# Patient Record
Sex: MALE | Race: WHITE | NOT HISPANIC OR LATINO | Employment: FULL TIME | URBAN - METROPOLITAN AREA
[De-identification: names, ages, dates, MRNs, and addresses within clinical notes are randomized per-mention and may not be internally consistent; named-entity substitution may affect disease eponyms.]

---

## 2022-04-14 ENCOUNTER — HOSPITAL ENCOUNTER (INPATIENT)
Facility: MEDICAL CENTER | Age: 47
LOS: 7 days | DRG: 871 | End: 2022-04-21
Attending: EMERGENCY MEDICINE | Admitting: STUDENT IN AN ORGANIZED HEALTH CARE EDUCATION/TRAINING PROGRAM
Payer: OTHER MISCELLANEOUS

## 2022-04-14 DIAGNOSIS — K68.12 PSOAS ABSCESS (HCC): ICD-10-CM

## 2022-04-14 DIAGNOSIS — M46.58 SEPTIC ARTHRITIS OF SACROILIAC JOINT (HCC): ICD-10-CM

## 2022-04-14 DIAGNOSIS — B95.61 MSSA BACTEREMIA: ICD-10-CM

## 2022-04-14 DIAGNOSIS — R16.1 SPLENOMEGALY: ICD-10-CM

## 2022-04-14 DIAGNOSIS — R78.81 MSSA BACTEREMIA: ICD-10-CM

## 2022-04-14 DIAGNOSIS — D64.9 NORMOCYTIC ANEMIA: ICD-10-CM

## 2022-04-14 LAB
ALBUMIN SERPL BCP-MCNC: 3.9 G/DL (ref 3.2–4.9)
ALBUMIN/GLOB SERPL: 0.9 G/DL
ALP SERPL-CCNC: 89 U/L (ref 30–99)
ALT SERPL-CCNC: 45 U/L (ref 2–50)
ANION GAP SERPL CALC-SCNC: 14 MMOL/L (ref 7–16)
APPEARANCE UR: CLEAR
AST SERPL-CCNC: 27 U/L (ref 12–45)
BACTERIA #/AREA URNS HPF: NEGATIVE /HPF
BASOPHILS # BLD AUTO: 0.3 % (ref 0–1.8)
BASOPHILS # BLD: 0.05 K/UL (ref 0–0.12)
BILIRUB SERPL-MCNC: 0.3 MG/DL (ref 0.1–1.5)
BILIRUB UR QL STRIP.AUTO: NEGATIVE
BUN SERPL-MCNC: 16 MG/DL (ref 8–22)
CALCIUM SERPL-MCNC: 9.4 MG/DL (ref 8.5–10.5)
CHLORIDE SERPL-SCNC: 100 MMOL/L (ref 96–112)
CO2 SERPL-SCNC: 23 MMOL/L (ref 20–33)
COLOR UR: YELLOW
CORTIS SERPL-MCNC: 8.8 UG/DL (ref 0–23)
CREAT SERPL-MCNC: 0.95 MG/DL (ref 0.5–1.4)
CRP SERPL HS-MCNC: 14.12 MG/DL (ref 0–0.75)
EOSINOPHIL # BLD AUTO: 0.29 K/UL (ref 0–0.51)
EOSINOPHIL NFR BLD: 1.7 % (ref 0–6.9)
EPI CELLS #/AREA URNS HPF: ABNORMAL /HPF
ERYTHROCYTE [DISTWIDTH] IN BLOOD BY AUTOMATED COUNT: 43.4 FL (ref 35.9–50)
ERYTHROCYTE [SEDIMENTATION RATE] IN BLOOD BY WESTERGREN METHOD: 123 MM/HOUR (ref 0–20)
GFR SERPLBLD CREATININE-BSD FMLA CKD-EPI: 98 ML/MIN/1.73 M 2
GLOBULIN SER CALC-MCNC: 4.3 G/DL (ref 1.9–3.5)
GLUCOSE SERPL-MCNC: 117 MG/DL (ref 65–99)
GLUCOSE UR STRIP.AUTO-MCNC: NEGATIVE MG/DL
HCT VFR BLD AUTO: 36.7 % (ref 42–52)
HGB BLD-MCNC: 12.1 G/DL (ref 14–18)
HYALINE CASTS #/AREA URNS LPF: ABNORMAL /LPF
IMM GRANULOCYTES # BLD AUTO: 0.13 K/UL (ref 0–0.11)
IMM GRANULOCYTES NFR BLD AUTO: 0.8 % (ref 0–0.9)
INR PPP: 1.25 (ref 0.87–1.13)
KETONES UR STRIP.AUTO-MCNC: NEGATIVE MG/DL
LACTATE BLD-SCNC: 0.7 MMOL/L (ref 0.5–2)
LACTATE BLD-SCNC: 1.5 MMOL/L (ref 0.5–2)
LACTATE BLD-SCNC: 2.1 MMOL/L (ref 0.5–2)
LEUKOCYTE ESTERASE UR QL STRIP.AUTO: ABNORMAL
LYMPHOCYTES # BLD AUTO: 2.08 K/UL (ref 1–4.8)
LYMPHOCYTES NFR BLD: 12.3 % (ref 22–41)
MCH RBC QN AUTO: 30.5 PG (ref 27–33)
MCHC RBC AUTO-ENTMCNC: 33 G/DL (ref 33.7–35.3)
MCV RBC AUTO: 92.4 FL (ref 81.4–97.8)
MICRO URNS: ABNORMAL
MONOCYTES # BLD AUTO: 0.47 K/UL (ref 0–0.85)
MONOCYTES NFR BLD AUTO: 2.8 % (ref 0–13.4)
NEUTROPHILS # BLD AUTO: 13.87 K/UL (ref 1.82–7.42)
NEUTROPHILS NFR BLD: 82.1 % (ref 44–72)
NITRITE UR QL STRIP.AUTO: NEGATIVE
NRBC # BLD AUTO: 0 K/UL
NRBC BLD-RTO: 0 /100 WBC
PH UR STRIP.AUTO: 6 [PH] (ref 5–8)
PLATELET # BLD AUTO: 452 K/UL (ref 164–446)
PMV BLD AUTO: 8.3 FL (ref 9–12.9)
POTASSIUM SERPL-SCNC: 4.1 MMOL/L (ref 3.6–5.5)
PROCALCITONIN SERPL-MCNC: 0.11 NG/ML
PROT SERPL-MCNC: 8.2 G/DL (ref 6–8.2)
PROT UR QL STRIP: NEGATIVE MG/DL
PROTHROMBIN TIME: 15.3 SEC (ref 12–14.6)
RBC # BLD AUTO: 3.97 M/UL (ref 4.7–6.1)
RBC # URNS HPF: ABNORMAL /HPF
RBC UR QL AUTO: ABNORMAL
SODIUM SERPL-SCNC: 137 MMOL/L (ref 135–145)
SP GR UR STRIP.AUTO: 1.02
UROBILINOGEN UR STRIP.AUTO-MCNC: 0.2 MG/DL
WBC # BLD AUTO: 16.9 K/UL (ref 4.8–10.8)
WBC #/AREA URNS HPF: ABNORMAL /HPF

## 2022-04-14 PROCEDURE — 87077 CULTURE AEROBIC IDENTIFY: CPT

## 2022-04-14 PROCEDURE — 700111 HCHG RX REV CODE 636 W/ 250 OVERRIDE (IP): Performed by: STUDENT IN AN ORGANIZED HEALTH CARE EDUCATION/TRAINING PROGRAM

## 2022-04-14 PROCEDURE — 82533 TOTAL CORTISOL: CPT

## 2022-04-14 PROCEDURE — 85652 RBC SED RATE AUTOMATED: CPT

## 2022-04-14 PROCEDURE — 87086 URINE CULTURE/COLONY COUNT: CPT

## 2022-04-14 PROCEDURE — 85025 COMPLETE CBC W/AUTO DIFF WBC: CPT

## 2022-04-14 PROCEDURE — 84145 PROCALCITONIN (PCT): CPT

## 2022-04-14 PROCEDURE — 36415 COLL VENOUS BLD VENIPUNCTURE: CPT

## 2022-04-14 PROCEDURE — 86140 C-REACTIVE PROTEIN: CPT

## 2022-04-14 PROCEDURE — 81001 URINALYSIS AUTO W/SCOPE: CPT

## 2022-04-14 PROCEDURE — 80053 COMPREHEN METABOLIC PANEL: CPT

## 2022-04-14 PROCEDURE — 770001 HCHG ROOM/CARE - MED/SURG/GYN PRIV*

## 2022-04-14 PROCEDURE — 700102 HCHG RX REV CODE 250 W/ 637 OVERRIDE(OP): Performed by: STUDENT IN AN ORGANIZED HEALTH CARE EDUCATION/TRAINING PROGRAM

## 2022-04-14 PROCEDURE — 99223 1ST HOSP IP/OBS HIGH 75: CPT | Mod: AI | Performed by: STUDENT IN AN ORGANIZED HEALTH CARE EDUCATION/TRAINING PROGRAM

## 2022-04-14 PROCEDURE — 85610 PROTHROMBIN TIME: CPT

## 2022-04-14 PROCEDURE — 87040 BLOOD CULTURE FOR BACTERIA: CPT

## 2022-04-14 PROCEDURE — 700105 HCHG RX REV CODE 258: Performed by: STUDENT IN AN ORGANIZED HEALTH CARE EDUCATION/TRAINING PROGRAM

## 2022-04-14 PROCEDURE — A9270 NON-COVERED ITEM OR SERVICE: HCPCS | Performed by: STUDENT IN AN ORGANIZED HEALTH CARE EDUCATION/TRAINING PROGRAM

## 2022-04-14 PROCEDURE — 83605 ASSAY OF LACTIC ACID: CPT

## 2022-04-14 PROCEDURE — 99285 EMERGENCY DEPT VISIT HI MDM: CPT

## 2022-04-14 PROCEDURE — 96365 THER/PROPH/DIAG IV INF INIT: CPT

## 2022-04-14 PROCEDURE — 700105 HCHG RX REV CODE 258: Performed by: EMERGENCY MEDICINE

## 2022-04-14 PROCEDURE — 87150 DNA/RNA AMPLIFIED PROBE: CPT

## 2022-04-14 PROCEDURE — 87186 SC STD MICRODIL/AGAR DIL: CPT

## 2022-04-14 PROCEDURE — 700111 HCHG RX REV CODE 636 W/ 250 OVERRIDE (IP): Performed by: EMERGENCY MEDICINE

## 2022-04-14 PROCEDURE — 96375 TX/PRO/DX INJ NEW DRUG ADDON: CPT

## 2022-04-14 RX ORDER — SODIUM CHLORIDE 9 MG/ML
1000 INJECTION, SOLUTION INTRAVENOUS ONCE
Status: COMPLETED | OUTPATIENT
Start: 2022-04-14 | End: 2022-04-14

## 2022-04-14 RX ORDER — ACETAMINOPHEN 325 MG/1
650 TABLET ORAL EVERY 6 HOURS PRN
Status: DISCONTINUED | OUTPATIENT
Start: 2022-04-14 | End: 2022-04-15

## 2022-04-14 RX ORDER — OXYCODONE HYDROCHLORIDE 5 MG/1
5 TABLET ORAL
Status: DISCONTINUED | OUTPATIENT
Start: 2022-04-14 | End: 2022-04-21 | Stop reason: HOSPADM

## 2022-04-14 RX ORDER — KETOROLAC TROMETHAMINE 30 MG/ML
15 INJECTION, SOLUTION INTRAMUSCULAR; INTRAVENOUS ONCE
Status: COMPLETED | OUTPATIENT
Start: 2022-04-14 | End: 2022-04-14

## 2022-04-14 RX ORDER — MORPHINE SULFATE 10 MG/ML
6 INJECTION, SOLUTION INTRAMUSCULAR; INTRAVENOUS ONCE
Status: COMPLETED | OUTPATIENT
Start: 2022-04-14 | End: 2022-04-14

## 2022-04-14 RX ORDER — CEFTRIAXONE 2 G/1
2 INJECTION, POWDER, FOR SOLUTION INTRAMUSCULAR; INTRAVENOUS ONCE
Status: COMPLETED | OUTPATIENT
Start: 2022-04-14 | End: 2022-04-14

## 2022-04-14 RX ORDER — GABAPENTIN 300 MG/1
300 CAPSULE ORAL 3 TIMES DAILY
Status: DISCONTINUED | OUTPATIENT
Start: 2022-04-14 | End: 2022-04-21 | Stop reason: HOSPADM

## 2022-04-14 RX ORDER — HYDRALAZINE HYDROCHLORIDE 20 MG/ML
10 INJECTION INTRAMUSCULAR; INTRAVENOUS EVERY 4 HOURS PRN
Status: DISCONTINUED | OUTPATIENT
Start: 2022-04-14 | End: 2022-04-21 | Stop reason: HOSPADM

## 2022-04-14 RX ORDER — ACETAMINOPHEN 500 MG
1000 TABLET ORAL
COMMUNITY

## 2022-04-14 RX ORDER — MORPHINE SULFATE 4 MG/ML
4 INJECTION INTRAVENOUS
Status: DISCONTINUED | OUTPATIENT
Start: 2022-04-14 | End: 2022-04-21 | Stop reason: HOSPADM

## 2022-04-14 RX ORDER — POLYETHYLENE GLYCOL 3350 17 G/17G
1 POWDER, FOR SOLUTION ORAL
Status: DISCONTINUED | OUTPATIENT
Start: 2022-04-14 | End: 2022-04-21 | Stop reason: HOSPADM

## 2022-04-14 RX ORDER — OXYCODONE HYDROCHLORIDE 10 MG/1
10 TABLET ORAL
Status: DISCONTINUED | OUTPATIENT
Start: 2022-04-14 | End: 2022-04-21 | Stop reason: HOSPADM

## 2022-04-14 RX ORDER — AMOXICILLIN 250 MG
2 CAPSULE ORAL 2 TIMES DAILY
Status: DISCONTINUED | OUTPATIENT
Start: 2022-04-14 | End: 2022-04-21 | Stop reason: HOSPADM

## 2022-04-14 RX ORDER — BISACODYL 10 MG
10 SUPPOSITORY, RECTAL RECTAL
Status: DISCONTINUED | OUTPATIENT
Start: 2022-04-14 | End: 2022-04-21 | Stop reason: HOSPADM

## 2022-04-14 RX ORDER — SODIUM CHLORIDE, SODIUM LACTATE, POTASSIUM CHLORIDE, AND CALCIUM CHLORIDE .6; .31; .03; .02 G/100ML; G/100ML; G/100ML; G/100ML
500 INJECTION, SOLUTION INTRAVENOUS
Status: DISCONTINUED | OUTPATIENT
Start: 2022-04-14 | End: 2022-04-21 | Stop reason: HOSPADM

## 2022-04-14 RX ADMIN — CEFTRIAXONE SODIUM 2 G: 2 INJECTION, POWDER, FOR SOLUTION INTRAMUSCULAR; INTRAVENOUS at 21:38

## 2022-04-14 RX ADMIN — SODIUM CHLORIDE 1000 ML: 9 INJECTION, SOLUTION INTRAVENOUS at 19:54

## 2022-04-14 RX ADMIN — OXYCODONE HYDROCHLORIDE 10 MG: 10 TABLET ORAL at 23:06

## 2022-04-14 RX ADMIN — MORPHINE SULFATE 6 MG: 10 INJECTION INTRAVENOUS at 20:37

## 2022-04-14 RX ADMIN — SODIUM CHLORIDE 1750 MG: 900 INJECTION, SOLUTION INTRAVENOUS at 21:40

## 2022-04-14 RX ADMIN — GABAPENTIN 300 MG: 300 CAPSULE ORAL at 23:21

## 2022-04-14 RX ADMIN — KETOROLAC TROMETHAMINE 15 MG: 30 INJECTION, SOLUTION INTRAMUSCULAR at 19:53

## 2022-04-14 ASSESSMENT — LIFESTYLE VARIABLES
EVER HAD A DRINK FIRST THING IN THE MORNING TO STEADY YOUR NERVES TO GET RID OF A HANGOVER: NO
TOTAL SCORE: 0
CONSUMPTION TOTAL: NEGATIVE
EVER FELT BAD OR GUILTY ABOUT YOUR DRINKING: NO
ON A TYPICAL DAY WHEN YOU DRINK ALCOHOL HOW MANY DRINKS DO YOU HAVE: 0
TOTAL SCORE: 0
AVERAGE NUMBER OF DAYS PER WEEK YOU HAVE A DRINK CONTAINING ALCOHOL: 0
HOW MANY TIMES IN THE PAST YEAR HAVE YOU HAD 5 OR MORE DRINKS IN A DAY: 0
HAVE YOU EVER FELT YOU SHOULD CUT DOWN ON YOUR DRINKING: NO
TOTAL SCORE: 0
HAVE PEOPLE ANNOYED YOU BY CRITICIZING YOUR DRINKING: NO
ALCOHOL_USE: NO

## 2022-04-14 ASSESSMENT — PATIENT HEALTH QUESTIONNAIRE - PHQ9
SUM OF ALL RESPONSES TO PHQ9 QUESTIONS 1 AND 2: 0
2. FEELING DOWN, DEPRESSED, IRRITABLE, OR HOPELESS: NOT AT ALL
1. LITTLE INTEREST OR PLEASURE IN DOING THINGS: NOT AT ALL

## 2022-04-14 ASSESSMENT — COGNITIVE AND FUNCTIONAL STATUS - GENERAL
SUGGESTED CMS G CODE MODIFIER MOBILITY: CJ
SUGGESTED CMS G CODE MODIFIER DAILY ACTIVITY: CH
MOBILITY SCORE: 22
MOVING FROM LYING ON BACK TO SITTING ON SIDE OF FLAT BED: A LITTLE
TURNING FROM BACK TO SIDE WHILE IN FLAT BAD: A LITTLE
DAILY ACTIVITIY SCORE: 24

## 2022-04-14 ASSESSMENT — FIBROSIS 4 INDEX: FIB4 SCORE: 0.43

## 2022-04-14 ASSESSMENT — PAIN DESCRIPTION - PAIN TYPE: TYPE: ACUTE PAIN

## 2022-04-15 ENCOUNTER — APPOINTMENT (OUTPATIENT)
Dept: RADIOLOGY | Facility: MEDICAL CENTER | Age: 47
DRG: 871 | End: 2022-04-15
Attending: HOSPITALIST
Payer: OTHER MISCELLANEOUS

## 2022-04-15 ENCOUNTER — APPOINTMENT (OUTPATIENT)
Dept: CARDIOLOGY | Facility: MEDICAL CENTER | Age: 47
DRG: 871 | End: 2022-04-15
Attending: STUDENT IN AN ORGANIZED HEALTH CARE EDUCATION/TRAINING PROGRAM
Payer: OTHER MISCELLANEOUS

## 2022-04-15 PROBLEM — D75.839 THROMBOCYTOSIS: Status: ACTIVE | Noted: 2022-04-15

## 2022-04-15 PROBLEM — R73.9 HYPERGLYCEMIA: Status: ACTIVE | Noted: 2022-04-15

## 2022-04-15 PROBLEM — A41.9 SEPSIS (HCC): Status: ACTIVE | Noted: 2022-04-15

## 2022-04-15 PROBLEM — D64.9 NORMOCYTIC ANEMIA: Status: ACTIVE | Noted: 2022-04-15

## 2022-04-15 PROBLEM — D72.829 LEUKOCYTOSIS: Status: ACTIVE | Noted: 2022-04-15

## 2022-04-15 PROBLEM — G89.4 CHRONIC PAIN SYNDROME: Status: ACTIVE | Noted: 2022-04-15

## 2022-04-15 LAB
ALBUMIN SERPL BCP-MCNC: 3 G/DL (ref 3.2–4.9)
ALBUMIN/GLOB SERPL: 0.9 G/DL
ALP SERPL-CCNC: 68 U/L (ref 30–99)
ALT SERPL-CCNC: 28 U/L (ref 2–50)
ANION GAP SERPL CALC-SCNC: 10 MMOL/L (ref 7–16)
AST SERPL-CCNC: 14 U/L (ref 12–45)
BASOPHILS # BLD AUTO: 0.3 % (ref 0–1.8)
BASOPHILS # BLD: 0.04 K/UL (ref 0–0.12)
BILIRUB SERPL-MCNC: 0.4 MG/DL (ref 0.1–1.5)
BUN SERPL-MCNC: 16 MG/DL (ref 8–22)
CALCIUM SERPL-MCNC: 8.9 MG/DL (ref 8.5–10.5)
CHLORIDE SERPL-SCNC: 99 MMOL/L (ref 96–112)
CO2 SERPL-SCNC: 25 MMOL/L (ref 20–33)
CREAT SERPL-MCNC: 0.88 MG/DL (ref 0.5–1.4)
EOSINOPHIL # BLD AUTO: 0.26 K/UL (ref 0–0.51)
EOSINOPHIL NFR BLD: 1.9 % (ref 0–6.9)
ERYTHROCYTE [DISTWIDTH] IN BLOOD BY AUTOMATED COUNT: 43.7 FL (ref 35.9–50)
GFR SERPLBLD CREATININE-BSD FMLA CKD-EPI: 106 ML/MIN/1.73 M 2
GLOBULIN SER CALC-MCNC: 3.4 G/DL (ref 1.9–3.5)
GLUCOSE SERPL-MCNC: 106 MG/DL (ref 65–99)
HCT VFR BLD AUTO: 31.2 % (ref 42–52)
HGB BLD-MCNC: 10.1 G/DL (ref 14–18)
IMM GRANULOCYTES # BLD AUTO: 0.06 K/UL (ref 0–0.11)
IMM GRANULOCYTES NFR BLD AUTO: 0.4 % (ref 0–0.9)
LV EJECT FRACT  99904: 48
LV EJECT FRACT MOD 2C 99903: 55.95
LV EJECT FRACT MOD 4C 99902: 66.38
LV EJECT FRACT MOD BP 99901: 61.49
LYMPHOCYTES # BLD AUTO: 1.97 K/UL (ref 1–4.8)
LYMPHOCYTES NFR BLD: 14.7 % (ref 22–41)
MAGNESIUM SERPL-MCNC: 1.9 MG/DL (ref 1.5–2.5)
MCH RBC QN AUTO: 30.1 PG (ref 27–33)
MCHC RBC AUTO-ENTMCNC: 32.4 G/DL (ref 33.7–35.3)
MCV RBC AUTO: 93.1 FL (ref 81.4–97.8)
MONOCYTES # BLD AUTO: 0.55 K/UL (ref 0–0.85)
MONOCYTES NFR BLD AUTO: 4.1 % (ref 0–13.4)
NEUTROPHILS # BLD AUTO: 10.51 K/UL (ref 1.82–7.42)
NEUTROPHILS NFR BLD: 78.6 % (ref 44–72)
NRBC # BLD AUTO: 0 K/UL
NRBC BLD-RTO: 0 /100 WBC
PHOSPHATE SERPL-MCNC: 2.6 MG/DL (ref 2.5–4.5)
PLATELET # BLD AUTO: 357 K/UL (ref 164–446)
PMV BLD AUTO: 8.3 FL (ref 9–12.9)
POTASSIUM SERPL-SCNC: 4 MMOL/L (ref 3.6–5.5)
PROT SERPL-MCNC: 6.4 G/DL (ref 6–8.2)
RBC # BLD AUTO: 3.35 M/UL (ref 4.7–6.1)
SCCMEC + MECA PNL NOSE NAA+PROBE: NEGATIVE
SODIUM SERPL-SCNC: 134 MMOL/L (ref 135–145)
WBC # BLD AUTO: 13.4 K/UL (ref 4.8–10.8)

## 2022-04-15 PROCEDURE — 84100 ASSAY OF PHOSPHORUS: CPT

## 2022-04-15 PROCEDURE — 93306 TTE W/DOPPLER COMPLETE: CPT | Mod: 26 | Performed by: INTERNAL MEDICINE

## 2022-04-15 PROCEDURE — 99233 SBSQ HOSP IP/OBS HIGH 50: CPT | Performed by: HOSPITALIST

## 2022-04-15 PROCEDURE — 93306 TTE W/DOPPLER COMPLETE: CPT

## 2022-04-15 PROCEDURE — 36415 COLL VENOUS BLD VENIPUNCTURE: CPT

## 2022-04-15 PROCEDURE — 99252 IP/OBS CONSLTJ NEW/EST SF 35: CPT | Performed by: ORTHOPAEDIC SURGERY

## 2022-04-15 PROCEDURE — 83735 ASSAY OF MAGNESIUM: CPT

## 2022-04-15 PROCEDURE — 87641 MR-STAPH DNA AMP PROBE: CPT

## 2022-04-15 PROCEDURE — A9270 NON-COVERED ITEM OR SERVICE: HCPCS | Performed by: STUDENT IN AN ORGANIZED HEALTH CARE EDUCATION/TRAINING PROGRAM

## 2022-04-15 PROCEDURE — 770001 HCHG ROOM/CARE - MED/SURG/GYN PRIV*

## 2022-04-15 PROCEDURE — 700117 HCHG RX CONTRAST REV CODE 255: Performed by: HOSPITALIST

## 2022-04-15 PROCEDURE — 700105 HCHG RX REV CODE 258: Performed by: STUDENT IN AN ORGANIZED HEALTH CARE EDUCATION/TRAINING PROGRAM

## 2022-04-15 PROCEDURE — 700111 HCHG RX REV CODE 636 W/ 250 OVERRIDE (IP): Performed by: HOSPITALIST

## 2022-04-15 PROCEDURE — 80053 COMPREHEN METABOLIC PANEL: CPT

## 2022-04-15 PROCEDURE — 700102 HCHG RX REV CODE 250 W/ 637 OVERRIDE(OP): Performed by: STUDENT IN AN ORGANIZED HEALTH CARE EDUCATION/TRAINING PROGRAM

## 2022-04-15 PROCEDURE — 74177 CT ABD & PELVIS W/CONTRAST: CPT

## 2022-04-15 PROCEDURE — 700101 HCHG RX REV CODE 250: Performed by: STUDENT IN AN ORGANIZED HEALTH CARE EDUCATION/TRAINING PROGRAM

## 2022-04-15 PROCEDURE — 85025 COMPLETE CBC W/AUTO DIFF WBC: CPT

## 2022-04-15 PROCEDURE — 700111 HCHG RX REV CODE 636 W/ 250 OVERRIDE (IP): Performed by: STUDENT IN AN ORGANIZED HEALTH CARE EDUCATION/TRAINING PROGRAM

## 2022-04-15 RX ORDER — ONDANSETRON 2 MG/ML
4 INJECTION INTRAMUSCULAR; INTRAVENOUS EVERY 4 HOURS PRN
Status: DISCONTINUED | OUTPATIENT
Start: 2022-04-15 | End: 2022-04-21 | Stop reason: HOSPADM

## 2022-04-15 RX ORDER — ACETAMINOPHEN 325 MG/1
650 TABLET ORAL EVERY 6 HOURS PRN
Status: DISCONTINUED | OUTPATIENT
Start: 2022-04-15 | End: 2022-04-21 | Stop reason: HOSPADM

## 2022-04-15 RX ORDER — SODIUM CHLORIDE, SODIUM LACTATE, POTASSIUM CHLORIDE, CALCIUM CHLORIDE 600; 310; 30; 20 MG/100ML; MG/100ML; MG/100ML; MG/100ML
INJECTION, SOLUTION INTRAVENOUS CONTINUOUS
Status: ACTIVE | OUTPATIENT
Start: 2022-04-15 | End: 2022-04-15

## 2022-04-15 RX ADMIN — VANCOMYCIN HYDROCHLORIDE 1500 MG: 500 INJECTION, POWDER, LYOPHILIZED, FOR SOLUTION INTRAVENOUS at 16:30

## 2022-04-15 RX ADMIN — SODIUM CHLORIDE, POTASSIUM CHLORIDE, SODIUM LACTATE AND CALCIUM CHLORIDE: 600; 310; 30; 20 INJECTION, SOLUTION INTRAVENOUS at 00:59

## 2022-04-15 RX ADMIN — OXYCODONE 5 MG: 5 TABLET ORAL at 18:32

## 2022-04-15 RX ADMIN — OXYCODONE HYDROCHLORIDE 10 MG: 10 TABLET ORAL at 03:38

## 2022-04-15 RX ADMIN — MORPHINE SULFATE 4 MG: 4 INJECTION INTRAVENOUS at 22:08

## 2022-04-15 RX ADMIN — GABAPENTIN 300 MG: 300 CAPSULE ORAL at 19:32

## 2022-04-15 RX ADMIN — SENNOSIDES AND DOCUSATE SODIUM 2 TABLET: 50; 8.6 TABLET ORAL at 18:32

## 2022-04-15 RX ADMIN — OXYCODONE 5 MG: 5 TABLET ORAL at 10:17

## 2022-04-15 RX ADMIN — CEFTRIAXONE SODIUM 2 G: 10 INJECTION, POWDER, FOR SOLUTION INTRAVENOUS at 22:15

## 2022-04-15 RX ADMIN — IOHEXOL 100 ML: 350 INJECTION, SOLUTION INTRAVENOUS at 19:00

## 2022-04-15 RX ADMIN — ONDANSETRON 4 MG: 2 INJECTION INTRAMUSCULAR; INTRAVENOUS at 10:19

## 2022-04-15 ASSESSMENT — PAIN DESCRIPTION - PAIN TYPE
TYPE: ACUTE PAIN

## 2022-04-15 ASSESSMENT — ENCOUNTER SYMPTOMS
HEARTBURN: 0
CHILLS: 1
BLURRED VISION: 0
ORTHOPNEA: 0
DIZZINESS: 0
SHORTNESS OF BREATH: 0
DEPRESSION: 0
SPEECH CHANGE: 0
HEMOPTYSIS: 0
BRUISES/BLEEDS EASILY: 0
FLANK PAIN: 0
FEVER: 0
FALLS: 0
FEVER: 1
NAUSEA: 1
VOMITING: 0
PALPITATIONS: 0
FOCAL WEAKNESS: 0
HEADACHES: 0
NAUSEA: 0
BACK PAIN: 1
WEAKNESS: 0
MYALGIAS: 1
ABDOMINAL PAIN: 0
DOUBLE VISION: 0
DIARRHEA: 0
COUGH: 0
NECK PAIN: 0
SPUTUM PRODUCTION: 0
SORE THROAT: 0

## 2022-04-15 ASSESSMENT — LIFESTYLE VARIABLES: SUBSTANCE_ABUSE: 0

## 2022-04-15 NOTE — CARE PLAN
The patient is Stable - Low risk of patient condition declining or worsening    Shift Goals  Clinical Goals: NPO: IR procedure  Patient Goals: abcess drained    Progress made toward(s) clinical / shift goals:  Patient diet advanced to regular.  Patient pain is well controlled with scheduled and Prn medications.  Patient resting comfortably.  Bed locked and in lowest position.  Patient calls appropriately.   Problem: Pain - Standard  Goal: Alleviation of pain or a reduction in pain to the patient’s comfort goal  Outcome: Progressing     Problem: Knowledge Deficit - Standard  Goal: Patient and family/care givers will demonstrate understanding of plan of care, disease process/condition, diagnostic tests and medications  Outcome: Progressing     Problem: Hemodynamics  Goal: Patient's hemodynamics, fluid balance and neurologic status will be stable or improve  Outcome: Progressing     Problem: Fluid Volume  Goal: Fluid volume balance will be maintained  Outcome: Progressing       Patient is not progressing towards the following goals:

## 2022-04-15 NOTE — PROGRESS NOTES
Hospital Medicine Daily Progress Note    Date of Service  4/15/2022    Chief Complaint  Gary Ramon is a 46 y.o. male admitted 4/14/2022 with   Chief Complaint   Patient presents with   • Sent by MD Dr. Eb Alcazar from [facility] sent patient over to rule out infection r/t large hematoma of psoas muscle   • Hip Pain     Hip, lower back, and buttocks pain since march. Seen by many specialists.          Hospital Course  This is a 46 year-old male with a past medical significant for chronic sciatica on the right side went to chiropractor with hope that his pain will get relieved.  When he went to chiropractor with some manipulation he was noted to have severe pain to the extent that he is unable to move.    69 to spine physician Dr. Wahl recommended following up with Dr. Alcazar.  He had an MRI on 4/7/2022 showing significant fluid within the psoas and iliac is muscle bellies as well as posteriorly near the piriformis concerning for hematoma or abscess.  This patient was sent to ER by orthopedic surgery.    Also reports associated with fever is 102 with the last 4 days.  Upon presentation in ER, is noted to be tachycardic with heart rate of 112, WBC 16.9, lactic acid 2.1, CRP 14.1, patient had a suprapubic catheter, which will be changed.    Blood Cx X 2 was pending.  Patient was started on broad-spectrum antibiotics including vancomycin and Rocephin.  IR guided drainage was ordered, pending.    Interval events:  -- No acute events overnight, patient complained of being nauseous, will provide Zofran.  Continue to complain of the pain on the right hip and buttock area. This gets worse with movement.  --Patient has been stable, heart rate 83-91.  Blood pressure improved, 99/61  --Trend lactic acid.  Unfortunately no blood work was obtained at the time of dictation  Depending upon the culture and sensitivity will consider calling ID.  Plan of care has been discussed with patient, nursing staff, case management        I  have personally seen and examined the patient at bedside. I discussed the plan of care with patient, bedside RN, charge RN and .    Consultants/Specialty  IR  Code Status  Full Code    Disposition  Patient is medically cleared for discharge.   Anticipate discharge to to home with close outpatient follow-up.  I have placed the appropriate orders for post-discharge needs.    Review of Systems  Review of Systems   Constitutional: Positive for malaise/fatigue. Negative for fever.   HENT: Negative for congestion and sore throat.    Eyes: Negative for blurred vision and double vision.   Respiratory: Negative for cough, hemoptysis and sputum production.    Cardiovascular: Negative for chest pain, palpitations and orthopnea.   Gastrointestinal: Positive for nausea. Negative for abdominal pain, diarrhea, heartburn and vomiting.   Musculoskeletal: Positive for back pain and joint pain.   Neurological: Negative for focal weakness and headaches.   Psychiatric/Behavioral: Negative for depression.   All other systems reviewed and are negative.       Physical Exam  Temp:  [36.7 °C (98.1 °F)-37.6 °C (99.6 °F)] 37.1 °C (98.8 °F)  Pulse:  [] 91  Resp:  [12-22] 17  BP: ()/(61-96) 112/77  SpO2:  [93 %-97 %] 95 %    Physical Exam  Vitals and nursing note reviewed.   Constitutional:       Appearance: Normal appearance.   HENT:      Head: Atraumatic.   Eyes:      Extraocular Movements: Extraocular movements intact.   Cardiovascular:      Rate and Rhythm: Normal rate.      Pulses: Normal pulses.   Pulmonary:      Effort: No respiratory distress.      Breath sounds: Normal breath sounds.   Abdominal:      General: Bowel sounds are normal. There is no distension.      Palpations: Abdomen is soft.      Tenderness: There is no abdominal tenderness.   Musculoskeletal:      Cervical back: Neck supple.      Right lower leg: No edema.      Left lower leg: No edema.      Comments: Decreased ROm in Right side 2/2 pain  TTP on  palpation   Skin:     General: Skin is warm.   Neurological:      Mental Status: He is alert and oriented to person, place, and time.      Cranial Nerves: No cranial nerve deficit.   Psychiatric:         Mood and Affect: Mood normal.         Fluids    Intake/Output Summary (Last 24 hours) at 4/15/2022 1024  Last data filed at 4/15/2022 0338  Gross per 24 hour   Intake 1000 ml   Output 450 ml   Net 550 ml       Laboratory  Recent Labs     04/14/22  1830 04/15/22  0935   WBC 16.9* 13.4*   RBC 3.97* 3.35*   HEMOGLOBIN 12.1* 10.1*   HEMATOCRIT 36.7* 31.2*   MCV 92.4 93.1   MCH 30.5 30.1   MCHC 33.0* 32.4*   RDW 43.4 43.7   PLATELETCT 452* 357   MPV 8.3* 8.3*     Recent Labs     04/14/22  1830   SODIUM 137   POTASSIUM 4.1   CHLORIDE 100   CO2 23   GLUCOSE 117*   BUN 16   CREATININE 0.95   CALCIUM 9.4     Recent Labs     04/14/22  2110   INR 1.25*               Imaging  EC-ECHOCARDIOGRAM COMPLETE W/O CONT    (Results Pending)   IR-CONSULT AND TREAT    (Results Pending)        Assessment/Plan  * Psoas abscess (HCC)- (present on admission)  Assessment & Plan  Abscess versus hematoma  IR consulted for drainage/drain placement; will follow Cx  From IR  Empiric abx: ceftriaxone + vancomycin  Npo now     Sepsis (HCC)  Assessment & Plan  This is Sepsis Present on admission  SIRS criteria identified on my evaluation include: Tachycardia, with heart rate greater than 90 BPM, Leukocytosis, with WBC greater than 12,000 and Bandemia, greater than 10% bands  Source is abscess  Sepsis protocol initiated  Fluid resuscitation ordered per protocol  Crystalloid Fluid Administration: Fluid resuscitation ordered per standard protocol - 30 mL/kg per current or ideal body weight  IV antibiotics as appropriate for source of sepsis  Reassessment: I have reassessed the patient's hemodynamic status          Normocytic anemia  Assessment & Plan  Monitor hemoglobin adequate, today is noted to be 10.1    Hyperglycemia  Assessment & Plan  F/u  HbA1c    Thrombocytosis  Assessment & Plan  Resolved, likely reactive secondary to infection    Chronic pain syndrome  Assessment & Plan  Multimodal Pain control    Leukocytosis  Assessment & Plan  Trending down, monitor, continue IV antibiotics       VTE prophylaxis: SCDs/TEDs  Patient is going for procedure

## 2022-04-15 NOTE — DOCUMENTATION QUERY
"                                                                         [facility]                                                                       Query Response Note      PATIENT:               RAUDEL CHEN  ACCT #:                  [id number]  MRN:                     [id number]  :                      1975  ADMIT DATE:       2022 5:52 PM  DISCH DATE:          RESPONDING  PROVIDER #:        [id number]           QUERY TEXT:    The diagnosis of sepsis has been documented in H&P and progress note .  (Customize how many SIRS criteria are present)  Please provide additional clinical indicators/SIRS criteria supportive of the documented diagnosis of sepsis.     Note: If an appropriate response is not listed below, please respond with a new note.    Sepsis - real or suspected infection plus 2 or more SIRS criteria  Temp <96.8 or >101  HR >90  RR >20  WBC <4,000 or > 12,000  >10% bandemia     If you have any questions please contact:  Nicki Narayanan RN CDI [facility]  [email or web address]  Nicki Narayanan [address]        The patient's Clinical Indicators include:  46 year old male admitted for Psoas abscess.    4/15 ED note teresa \"/96 P 112, T 99.6, R 18, O2 93%\"  \"Nontoxic appearing, alert in no apparent distress.\"  \"We discussed possibly holding off on antibiotics however I am concerned due to signs of sepsis and petechial rash possibly indicating bactermia therefor will cover with antibiotics.\"    4/15 Zachariah \"Patient has been stable, heart rate 83-91. Blood pressure improved 99/61\"  \"Positive for malaise/fatigue. negative for fever\"  \"this is Sepsis present on admission. SIRS criteria identified on my evaluation include: Tachycardia, with heart rate greater than 90 BPM, Leukocytosis, with WBCC greater than 12,000 and Bandemia, greater than 10% bands.\"    WBC 16.9, 13.4  Lac 2.1, 0.7, 1.5  Procal 0.11    Risk factor: psoas abscess  Treatment: labs, abx,  Options provided:   -- " Sepsis exists, (please document additional + SIRS criteria and clinical indicators)   -- Sepsis does not exist - amended documentation in the medical record and updated problem list   -- Unable to provide additional clarity regarding the diagnosis   -- Unable to determine      Query created by: Nicki Narayanan on 4/15/2022 2:02 PM    RESPONSE TEXT:    sepsis exists - On admit HR > 90 and wbc greater > 12 and source of infecteion in the pelvis area. Pending IR guided drainage          Electronically signed by:  DAMON LEVI MD 4/15/2022 2:18 PM

## 2022-04-15 NOTE — CARE PLAN
Problem: Nutritional:  Goal: Achieve adequate nutritional intake  Description: Oral diet to start and pt will consume >50% from meals provided   Outcome: Not Met

## 2022-04-15 NOTE — ED TRIAGE NOTES
"Chief Complaint   Patient presents with   • Sent by MD Dr. Eb Alcazar from [facility] sent patient over to rule out infection r/t large hematoma of psoas muscle   • Hip Pain     Hip, lower back, and buttocks pain since march. Seen by many specialists.      Pt wheeled into triage for above complaint. He went to a chiropractor on March 24th ago for a manipulation and had severe pain afterwards.  He had difficulty walking and went to the emergency room twice at [facility], once via ambulance transport because of the pain. He has had CT scan, x-rays, and MRI done.  The pain is worse with any motion of the right hip.  He has had fevers over the last 4 to 5 days.  Pt does have a suprapubic catheter in place.  Dr. Alcazar sent him here to rule out an infectious hematoma of the psoas muscle which was discovered on the MRI.     /96   Pulse (!) 112   Temp 37.6 °C (99.6 °F) (Temporal)   Resp 18   Ht 1.803 m (5' 11\")   Wt 74.8 kg (165 lb)   SpO2 93%   BMI 23.01 kg/m²     "

## 2022-04-15 NOTE — CONSULTS
ORTHOPEDIC SURGERY CONSULT NOTE      Reason for Consult  Right hip pain    Consulting Physician  Dr. Zachariah JAVIER  Gary Ramon is a 46 y.o. male who presented to my clinic yesterday as a referral from Dr. Romero in spine. He had about two weeks of worsening right buttock, groin, and anterior thigh pain as well as fevers over the past 4-5 days. His imaging demonstrated significant soft tissue edema and fluid collections within his psoas muscle and in the posterior soft tissues, adjacent to the piriformis. Out of concern for an infectious process, I recommended he go to the ER for an infectious workup.     On admission, he was found to be tachycardic, with labwork reflecting a WBC of 16.9, lactic acid 2.1, CRP 14.1. He has been on sepsis protocol with IV abx.     Today, he reports no change in symptoms. He has been NPO for planned IR drainage/culture of potential psoas fluid collection.    PMH/PSH  Past Medical History:   Diagnosis Date   • Concussion        History reviewed. No pertinent surgical history.    Meds    Current Facility-Administered Medications:   •  vancomycin (VANCOCIN) 1,500 mg in  mL IVPB, 21 mg/kg, Intravenous, Q18HRS, Boston Moran M.D.  •  acetaminophen (Tylenol) tablet 650 mg, 650 mg, Oral, Q6HRS PRN, Te Soni M.D.  •  ondansetron (ZOFRAN) syringe/vial injection 4 mg, 4 mg, Intravenous, Q4HRS PRN, Te Soni M.D., 4 mg at 04/15/22 1019  •  senna-docusate (PERICOLACE or SENOKOT S) 8.6-50 MG per tablet 2 Tablet, 2 Tablet, Oral, BID **AND** polyethylene glycol/lytes (MIRALAX) PACKET 1 Packet, 1 Packet, Oral, QDAY PRN **AND** magnesium hydroxide (MILK OF MAGNESIA) suspension 30 mL, 30 mL, Oral, QDAY PRN **AND** bisacodyl (DULCOLAX) suppository 10 mg, 10 mg, Rectal, QDAY PRN, Boston Moran M.D.  •  lactated ringers infusion (BOLUS), 500 mL, Intravenous, Once PRN, Boston Moran M.D.  •  hydrALAZINE (APRESOLINE) injection 10 mg, 10 mg, Intravenous, Q4HRS PRN, Boston Moran M.D.  •   "gabapentin (NEURONTIN) capsule 300 mg, 300 mg, Oral, TID, Boston Moran M.D., 300 mg at 04/14/22 2321  •  Pharmacy Consult Request ...Pain Management Review 1 Each, 1 Each, Other, PHARMACY TO DOSE, Boston Moran M.D.  •  oxyCODONE immediate-release (ROXICODONE) tablet 5 mg, 5 mg, Oral, Q3HRS PRN, 5 mg at 04/15/22 1017 **OR** oxyCODONE immediate release (ROXICODONE) tablet 10 mg, 10 mg, Oral, Q3HRS PRN, 10 mg at 04/15/22 0338 **OR** morphine 4 MG/ML injection 4 mg, 4 mg, Intravenous, Q3HRS PRN, Boston Moran M.D.  •  MD Alert...Vancomycin per Pharmacy, , Other, PHARMACY TO DOSE, Boston Moran M.D.  •  cefTRIAXone (Rocephin) syringe 2 g, 2 g, Intravenous, Q24HR, Boston Moran M.D.    Allergies  Patient has no known allergies.    Social History  Social History     Socioeconomic History   • Marital status:    Tobacco Use   • Smoking status: Never Smoker   • Smokeless tobacco: Never Used   Vaping Use   • Vaping Use: Never used   Substance and Sexual Activity   • Alcohol use: Not Currently   • Drug use: Yes     Types: Inhaled     Comment: marijuana occ       Family History  History reviewed. No pertinent family history.    ROS  Negative except as per HPI      Physical Exam  /77   Pulse 91   Temp 37.1 °C (98.8 °F) (Temporal)   Resp 17   Ht 1.803 m (5' 11\")   Wt 74.8 kg (164 lb 14.5 oz)   SpO2 95%   BMI 23.00 kg/m²   General - alert, oriented, NAD  HEENT - AT/NC  CV - RRR  Respiratory - no increased work of breath  Abdominal - no bloating  Neurologic - normal gait, no balance issues  MSK - RLE:  No gross deformity  Pain with log roll/ROM of the right hip  NV intact distally    Labs  Lab Results   Component Value Date/Time    SODIUM 134 (L) 04/15/2022 09:35 AM    POTASSIUM 4.0 04/15/2022 09:35 AM    CHLORIDE 99 04/15/2022 09:35 AM    CO2 25 04/15/2022 09:35 AM    GLUCOSE 106 (H) 04/15/2022 09:35 AM    BUN 16 04/15/2022 09:35 AM    CREATININE 0.88 04/15/2022 09:35 AM      Lab Results   Component Value " Date/Time    WBC 13.4 (H) 04/15/2022 09:35 AM    RBC 3.35 (L) 04/15/2022 09:35 AM    HEMOGLOBIN 10.1 (L) 04/15/2022 09:35 AM    HEMATOCRIT 31.2 (L) 04/15/2022 09:35 AM    MCV 93.1 04/15/2022 09:35 AM    MCH 30.1 04/15/2022 09:35 AM    MCHC 32.4 (L) 04/15/2022 09:35 AM    MPV 8.3 (L) 04/15/2022 09:35 AM    NEUTSPOLYS 78.60 (H) 04/15/2022 09:35 AM    LYMPHOCYTES 14.70 (L) 04/15/2022 09:35 AM    MONOCYTES 4.10 04/15/2022 09:35 AM    EOSINOPHILS 1.90 04/15/2022 09:35 AM    BASOPHILS 0.30 04/15/2022 09:35 AM      Imaging    EC-ECHOCARDIOGRAM COMPLETE W/O CONT    (Results Pending)   IR-CONSULT AND TREAT    (Results Pending)     Assessment  Gary Ramon is a 46 y.o. male with right psoas abscess vs hematoma.    Plan  - No planned orthopaedic surgical intervention  - Plan for IR guided drain placement for culture of psoas lesion  - IV abx per primary  - Remainder of care primary    Eb Alcazar M.D.  [facility]

## 2022-04-15 NOTE — DISCHARGE PLANNING
MDR: Psoas abscess pending IR drainage, WBC's 13.4, IV ceftriaxone every 24 hours. Case management will continue to follow.

## 2022-04-15 NOTE — ASSESSMENT & PLAN NOTE
Noted in MRI on 4/7/2022. CT scan showing Right posterior/inferior psoas muscle fluid collection which extends into the anterior/inferior iliacus muscle and measures 6.5 x 5 x 2.5 cm in size on 4/16   --Status post IR guided drainage placement on 4/16/2022  Culture growing MSSA  Continue IV Ancef as per infectious disease recommendation, last day being 5/28/2022 4/19-patient developed a rash, concerning for drug reaction from cefazolin.  Monitor electrolytes, kidney function, complete blood count.  Appreciate infectious disease recommendations.  Drain in place.  Patient will need repeat CT scan 4/21 4/20- ok to repeat CT scan today per IR. Drain management per IR. Antibiotic upgraded to daptomycin

## 2022-04-15 NOTE — PROGRESS NOTES
· 2 RN skin check complete, Cisco RN and Trang RN  · Devices in place; PIV  · Skin assessed under devices ; none  · Confirmed pressure ulcers found on; none  · New potential pressure ulcers noted on ; none  · Few scattered bruises on LE's    · The following interventions are in place; pillow supports, patient repositions self

## 2022-04-15 NOTE — HOSPITAL COURSE
This is a 46 year-old male with a past medical significant for chronic sciatica on the right side went to chiropractor with hope that his pain will get relieved.  When he went to chiropractor with some manipulation he was noted to have severe pain to the extent that he is unable to move.    69 to spine physician Dr. Wahl recommended following up with Dr. Alcazar.  He had an MRI on 4/7/2022 showing significant fluid within the psoas and iliac is muscle bellies as well as posteriorly near the piriformis concerning for hematoma or abscess.  This patient was sent to ER by orthopedic surgery.    Also reports associated with fever is 102 with the last 4 days.  Upon presentation in ER, is noted to be tachycardic with heart rate of 112, WBC 16.9, lactic acid 2.1, CRP 14.1, patient had a suprapubic catheter, which will be changed.    Blood Cx X 2 was pending.  Patient was started on broad-spectrum antibiotics including vancomycin and Rocephin.  IR guided drainage was ordered, pending.    Interval events:  -- No acute events overnight, patient complained of being nauseous, will provide Zofran.  Continue to complain of the pain on the right hip and buttock area. This gets worse with movement.  --Patient has been stable, heart rate 83-91.  Blood pressure improved, 99/61  --Trend lactic acid.  Unfortunately no blood work was obtained at the time of dictation  Depending upon the culture and sensitivity will consider calling ID.  Plan of care has been discussed with patient, nursing staff, case management

## 2022-04-15 NOTE — PROGRESS NOTES
Pharmacy Vancomycin Kinetics Note for 4/15/2022     46 y.o. male on Vancomycin day # 2     Vancomycin Indication (AUC Dosing): Skin/skin structure infection    Provider specified end date: 04/19/22    Active Antibiotics (From admission, onward)    Ordered     Ordering Provider       Fri Apr 15, 2022  3:32 AM    04/15/22 0332  vancomycin (VANCOCIN) 1,500 mg in  mL IVPB  (vancomycin (VANCOCIN) IV (LD + Maintenance))  EVERY 18 HOURS        Note to Pharmacy: Per P&T Kinetics Protocol    Boston Moran M.D.       Thu Apr 14, 2022 10:02 PM    04/14/22 2202  cefTRIAXone (Rocephin) syringe 2 g  EVERY 24 HOURS         Boston Moran M.D.    04/14/22 2202  MD Alert...Vancomycin per Pharmacy  PHARMACY TO DOSE        Question:  Indication(s) for vancomycin?  Answer:  Skin and soft tissue infection    Boston Moran M.D.        Dosing Weight: 74.8 kg (164 lb 14.5 oz)    Admission History: Admitted on 4/14/2022 for Psoas abscess (HCC) [K68.12]  Pertinent history: Referred by outside orthopedic provider for concern of psoas abscess and SSTI. Antimicrobials initiated.    Allergies:   Patient has no known allergies.     Pertinent cultures to date:     Results     Procedure Component Value Units Date/Time    URINE CULTURE(NEW) [[id number]] Collected: 04/14/22 1957    Order Status: No result Specimen: Urine Updated: 04/14/22 2316    Narrative:      Indication for culture:->Patient WITHOUT an indwelling Martin  catheter in place with new onset of Dysuria, Frequency,  Urgency, and/or Suprapubic pain    Urinalysis [[id number]]     Order Status: Sent Specimen: Urine     URINALYSIS CULTURE, IF INDICATED [[id number]]  (Abnormal) Collected: 04/14/22 1957    Order Status: Completed Specimen: Urine Updated: 04/14/22 2046     Color Yellow     Character Clear     Specific Gravity 1.019     Ph 6.0     Glucose Negative mg/dL      Ketones Negative mg/dL      Protein Negative mg/dL      Bilirubin Negative     Urobilinogen, Urine 0.2     Nitrite  "Negative     Leukocyte Esterase Small     Occult Blood Moderate     Micro Urine Req Microscopic    Narrative:      Indication for culture:->Patient WITHOUT an indwelling Martin  catheter in place with new onset of Dysuria, Frequency,  Urgency, and/or Suprapubic pain    Blood Culture [[id number]] Collected: 22 184    Order Status: Sent Specimen: Blood from Peripheral Updated: 22    Narrative:      2 of 2 blood culture x2  Sites order. Per Hospital Policy:  Only change Specimen Src: to \"Line\" if specified by physician  order.    Blood Culture [[id number]] Collected: 22    Order Status: Sent Specimen: Blood from Peripheral Updated: 22    Narrative:      1 of 2 for Blood Culture x 2 sites order. Per Hospital  Policy: Only change Specimen Src: to \"Line\" if specified by  physician order.        Labs:   Estimated Creatinine Clearance: 100.6 mL/min (by C-G formula based on SCr of 0.95 mg/dL).     Recent Labs     22   WBC 16.9*   NEUTSPOLYS 82.10*     Recent Labs     22   BUN 16   CREATININE 0.95   ALBUMIN 3.9     Intake/Output Summary (Last 24 hours) at 4/15/2022 0332  Last data filed at 2022 2200  Gross per 24 hour   Intake 1000 ml   Output --   Net 1000 ml      /72   Pulse 97   Temp 37.5 °C (99.5 °F) (Temporal)   Resp 18   Ht 1.803 m (5' 11\")   Wt 74.8 kg (164 lb 14.5 oz)   SpO2 96%  Temp (24hrs), Av.5 °C (99.5 °F), Min:37.5 °C (99.5 °F), Max:37.6 °C (99.6 °F)    List concerns for Vancomycin clearance:   BUN/Scr ratio greater than 20:1    Pharmacokinetics:  AUC kinetics:   Ke (hr ^-1): 0.0879 hr^-1  Half life: 7.89 hr  Clearance: 4.274  Estimated TDD: 2137  Estimated Dose: 882  Estimated interval: 9.9    A/P:   -  Vancomycin dose: 1500 mg iv q18h (~21 mg/kg/dose)    -  Next vancomycin level(s):    -TBD    -  Predicted vancomycin AUC from initial AUC test calculator: 468 mg·hr/L    -  Comments: Case referred to AMS team. CMP with AM labs. " MRSA nares pending. Will defer ordering levels to daytime clinical pharmacist pending clinical course. Pharmacy will continue to follow.    Jasper Sim, PharmD

## 2022-04-15 NOTE — H&P
Hospital Medicine History & Physical Note    Date of Service  4/14/2022    Primary Care Physician  Pcp Pt States None    Consultants  Orthopedic  IR    Code Status  Full Code    Chief Complaint  Chief Complaint   Patient presents with   • Sent by MD Dr. Eb Alcazar from [facility] sent patient over to rule out infection r/t large hematoma of psoas muscle   • Hip Pain     Hip, lower back, and buttocks pain since march. Seen by many specialists.        History of Presenting Illness  Gary Ramon is a 46 y.o. male with history of chronic sciatica, recent chiropractor manipulation of RLE with subsequent pain and found to have concerning abscess/hematoma of the right psoas on MRI, hypospadias recent suprapubic catheter who presented 4/14/2022 as referral from [facility] clinic for evaluation of right psoas abscess/hematoma.  Patient reported having chiropractic manipulation of RLE, reported subsequent pain.  He visited ER twice, no hematoma/abscess ED on CT.  Patient was then seen at [facility] clinic on 4/4/2022, had MRI of RLE on 4/7/2022 which noted to have concerning abscess/hematoma.  Patient also reported having fever as high as 102 Fahrenheit at home for the past 4 days.  Patient was seen at orthopedic clinic earlier, subsequently sent to [facility] ER for evaluation and IR evaluation for drainage.  Admitted to medicine service.    I discussed the plan of care with patient, family and bedside RN.    Review of Systems  Review of Systems   Constitutional: Positive for chills, fever and malaise/fatigue.   HENT: Negative for hearing loss and tinnitus.    Eyes: Negative for blurred vision and double vision.   Respiratory: Negative for shortness of breath.    Cardiovascular: Negative for chest pain and leg swelling.   Gastrointestinal: Negative for abdominal pain, heartburn, nausea and vomiting.   Genitourinary: Negative for dysuria and flank pain.   Musculoskeletal: Positive for back pain, joint pain and myalgias. Negative for falls and  neck pain.   Skin: Negative for itching and rash.   Neurological: Negative for dizziness, speech change, focal weakness, weakness and headaches.   Endo/Heme/Allergies: Negative for environmental allergies. Does not bruise/bleed easily.   Psychiatric/Behavioral: Negative for depression and substance abuse.   All other systems reviewed and are negative.      Past Medical History   has a past medical history of Concussion.   Hypospadias dependent on suprapubic catheter    Surgical History   has no past surgical history on file.   Creation on suprapubic catheter approximately 2 months prior to ER presentation  Multiple orthopedic surgeries    Family History  family history is not on file.   Family history reviewed with patient. There is no family history that is pertinent to the chief complaint.     Social History   reports that he has never smoked. He has never used smokeless tobacco. He reports previous alcohol use. He reports current drug use. Drug: Inhaled.    Allergies  No Known Allergies    Medications  Prior to Admission Medications   Prescriptions Last Dose Informant Patient Reported? Taking?   acetaminophen (TYLENOL) 500 MG Tab 4/14/2022 at 1100 Patient Yes Yes   Sig: Take 1,000 mg by mouth 2 times daily with meals as needed. Indications: Pain   gabapentin (NEURONTIN) 300 MG Cap 4/14/2022 at 1100 Patient No No   Sig: Take 1 Capsule by mouth 3 times a day. Start with 300mg PO nightly, Day 2 start 300mg PO BID, Day 7 start 300mg PO TID, then increase each dose (one at a time) every 5 days by 300mg to 900mg TID maximum   Patient taking differently: Take 300 mg by mouth 3 times a day.   meloxicam (MOBIC) 15 MG tablet 4/13/2022 at 2000 Patient No No   Sig: Take 1 Tablet by mouth every day for 30 days.      Facility-Administered Medications: None       Physical Exam  Temp:  [37.5 °C (99.5 °F)-37.6 °C (99.6 °F)] 37.5 °C (99.5 °F)  Pulse:  [] 97  Resp:  [12-22] 18  BP: (109-159)/(69-96) 109/72  SpO2:  [93 %-97 %]  96 %  Blood Pressure: 131/77   Temperature: 37.6 °C (99.6 °F)   Pulse: (!) 112   Respiration: 18   Pulse Oximetry: 93 %       Physical Exam  Vitals and nursing note reviewed.   Constitutional:       Appearance: Normal appearance.   HENT:      Head: Normocephalic and atraumatic.      Nose: Nose normal.      Mouth/Throat:      Mouth: Mucous membranes are dry.      Pharynx: Oropharynx is clear.   Eyes:      General: No scleral icterus.     Extraocular Movements: Extraocular movements intact.   Cardiovascular:      Rate and Rhythm: Normal rate and regular rhythm.      Pulses: Normal pulses.      Heart sounds:     No friction rub.   Pulmonary:      Effort: Pulmonary effort is normal. No respiratory distress.      Breath sounds: No stridor. No wheezing.   Abdominal:      General: There is no distension.      Palpations: Abdomen is soft.      Tenderness: There is no abdominal tenderness. There is no guarding or rebound.   Genitourinary:     Comments: Suprapubic catheter present  Musculoskeletal:         General: Tenderness and signs of injury present.      Cervical back: Neck supple. No tenderness.      Comments: Tender in right flank, lateral hip  Unable to flex/extend at right knee or hip   Skin:     General: Skin is warm and dry.      Capillary Refill: Capillary refill takes less than 2 seconds.   Neurological:      General: No focal deficit present.      Mental Status: He is alert and oriented to person, place, and time.      Motor: No weakness.   Psychiatric:         Mood and Affect: Mood normal.         Laboratory:  Recent Labs     04/14/22  1830   WBC 16.9*   RBC 3.97*   HEMOGLOBIN 12.1*   HEMATOCRIT 36.7*   MCV 92.4   MCH 30.5   MCHC 33.0*   RDW 43.4   PLATELETCT 452*   MPV 8.3*     Recent Labs     04/14/22  1830   SODIUM 137   POTASSIUM 4.1   CHLORIDE 100   CO2 23   GLUCOSE 117*   BUN 16   CREATININE 0.95   CALCIUM 9.4     Recent Labs     04/14/22  1830   ALTSGPT 45   ASTSGOT 27   ALKPHOSPHAT 89   TBILIRUBIN 0.3    GLUCOSE 117*     Recent Labs     04/14/22  2110   INR 1.25*     No results for input(s): NTPROBNP in the last 72 hours.      No results for input(s): TROPONINT in the last 72 hours.    Imaging:  No orders to display       no X-Ray or EKG requiring interpretation    Assessment/Plan:  I anticipate this patient will require at least two midnights for appropriate medical management, necessitating inpatient admission.    * Psoas abscess (HCC)- (present on admission)  Assessment & Plan  Abscess versus hematoma  IR consulted for drainage/drain placement  Empiric abx: ceftriaxone + vancomycin  F/u BCx, CX IR    Sepsis (HCC)  Assessment & Plan  This is Sepsis Present on admission  SIRS criteria identified on my evaluation include: Tachycardia, with heart rate greater than 90 BPM, Leukocytosis, with WBC greater than 12,000 and Bandemia, greater than 10% bands  Source is abscess  Sepsis protocol initiated  Fluid resuscitation ordered per protocol  Crystalloid Fluid Administration: Fluid resuscitation ordered per standard protocol - 30 mL/kg per current or ideal body weight  IV antibiotics as appropriate for source of sepsis  Reassessment: I have reassessed the patient's hemodynamic status          Chronic pain syndrome  Assessment & Plan  Pain control    Hyperglycemia  Assessment & Plan  F/u HbA1c    Thrombocytosis  Assessment & Plan  Likely reactive    Leukocytosis  Assessment & Plan  Trend  IVF, abx      VTE prophylaxis: SCDs/TEDs

## 2022-04-15 NOTE — ED NOTES
Med Rec complete per Pt at bedside w/family present.   Allergies reviewed.   Home Pharmacy:  [facility] [place]

## 2022-04-15 NOTE — ED PROVIDER NOTES
ED Provider Note    Scribed for Ramona Sweet M.D. by Karan Hartley. 4/14/2022  6:30 PM    Means of arrival: Sent by MD  History obtained from: Patient  History limited by: None    CHIEF COMPLAINT  Chief Complaint   Patient presents with   • Sent by MD Dr. Eb Alcazar from [facility] sent patient over to rule out infection r/t large hematoma of psoas muscle   • Hip Pain     Hip, lower back, and buttocks pain since march. Seen by many specialists.        HPI  Gary Ramon is a 46 y.o. male who presents to the Emergency Department for evaluation to rule out infection of right PSOAS hematoma. He was sent by Dr. Eb Alcazar from [facility] today. He reports he has been experiencing hip pain for approximately 3 weeks. 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 He notes the following day his pain was worse and by 2 days later, he could no longer bear weight due to the intensity of his pain. He states he was seen at [facility] ER twice, once with ambulance transport because of the pain, and imaging was performed. He reports he was seen at the [facility] on 4/4/22 for his symptoms and MRI performed by them one week ago. He states he was seen at the [facility] today for a follow-up appointment with Dr. Alcazar, who sent him here for further evaluation to rule out infection of right PSOAS hematoma found on the MRI. He describes his buttock pain as originating from his coccyx and radiating through his lower glute into his right hip. He admits to associated symptoms of fever (102°F onset 4 days ago), but denies any skin color changes, pain radiating down his leg, muscle weakness, new falls or trauma, or tingling or numbness to extremities. He reports Toradol alleviated his pain when he was seen at [facility] previously. He additionally reports he has had a suprapubic catheter in place for  "several months due to hypospadius.    REVIEW OF SYSTEMS  Pertinent positive include right buttock pain, right hip pain, and fever. Pertinent negative include no skin color changes, pain radiating down his leg, muscle weakness, new falls or trauma, or tingling or numbness to extremities. All other systems reviewed and are negative.      PAST MEDICAL HISTORY   has a past medical history of Concussion.    SOCIAL HISTORY  Social History     Tobacco Use   • Smoking status: Never Smoker   • Smokeless tobacco: Never Used   Vaping Use   • Vaping Use: Never used   Substance and Sexual Activity   • Alcohol use: Not Currently   • Drug use: Yes     Types: Inhaled     Comment: marijuana occ     SURGICAL HISTORY  patient denies any surgical history    CURRENT MEDICATIONS  Current Outpatient Medications   Medication Instructions   • gabapentin (NEURONTIN) 300 mg, Oral, 3 TIMES DAILY, Start with 300mg PO nightly, Day 2 start 300mg PO BID, Day 7 start 300mg PO TID, then increase each dose (one at a time) every 5 days by 300mg to 900mg TID maximum   • hydrocodone-ibuprofen (VICOPROFEN) 7.5-200 MG per tablet 1 Tablet, PRN   • meloxicam (MOBIC) 15 mg, Oral, DAILY   • methylPREDNISolone (MEDROL DOSEPAK) 4 MG Tablet Therapy Pack Follow schedule on package instructions.     ALLERGIES  No Known Allergies    PHYSICAL EXAM   VITAL SIGNS: /96   Pulse (!) 112   Temp 37.6 °C (99.6 °F) (Temporal)   Resp 18   Ht 1.803 m (5' 11\")   Wt 74.8 kg (165 lb)   SpO2 93%   BMI 23.01 kg/m²    Constitutional: Nontoxic appearing, alert in no apparent distress.  HENT: Normocephalic, Atraumatic. Bilateral external ears normal. Nose normal.  Moist mucous membranes.  Oropharynx clear.  Eyes: Pupils are equal and reactive. Conjunctiva normal.   Neck: Supple, full range of motion  Heart: Regular rate and rhythm.  No murmurs.    Lungs: No respiratory distress, normal work of breathing. Lungs clear to auscultation bilaterally.  Abdomen Soft, no " distention.  No tenderness to palpation. Suprapubic catheter in place without surrounding erythema.   Musculoskeletal: Atraumatic. No obvious deformities noted.  No lower extremity edema. Exquisite pain w/ ROM of the right hip. No overlying erythema or warmth. Scattered petechial rash to right buttock and extending down right calf. 2+ DP pulses bilaterally.  Skin: Warm, Dry.  No erythema. See musculoskeletal.  Neurologic: Alert and oriented x3. Moving all extremities spontaneously without focal deficits.  Psychiatric: Affect normal, Mood normal, Appears appropriate and not intoxicated.      DIAGNOSTIC STUDIES    LABS  Personally reviewed by me  Labs Reviewed   CBC WITH DIFFERENTIAL - Abnormal; Notable for the following components:       Result Value    WBC 16.9 (*)     RBC 3.97 (*)     Hemoglobin 12.1 (*)     Hematocrit 36.7 (*)     MCHC 33.0 (*)     Platelet Count 452 (*)     MPV 8.3 (*)     Neutrophils-Polys 82.10 (*)     Lymphocytes 12.30 (*)     Neutrophils (Absolute) 13.87 (*)     Immature Granulocytes (abs) 0.13 (*)     All other components within normal limits   COMP METABOLIC PANEL - Abnormal; Notable for the following components:    Glucose 117 (*)     Globulin 4.3 (*)     All other components within normal limits   LACTIC ACID - Abnormal; Notable for the following components:    Lactic Acid 2.1 (*)     All other components within normal limits   CRP QUANTITIVE (NON-CARDIAC) - Abnormal; Notable for the following components:    Stat C-Reactive Protein 14.12 (*)     All other components within normal limits   SED RATE - Abnormal; Notable for the following components:    Sed Rate Westergren 123 (*)     All other components within normal limits   URINALYSIS,CULTURE IF INDICATED - Abnormal; Notable for the following components:    Leukocyte Esterase Small (*)     Occult Blood Moderate (*)     All other components within normal limits    Narrative:     Indication for culture:->Patient WITHOUT an indwelling  "Martin  catheter in place with new onset of Dysuria, Frequency,  Urgency, and/or Suprapubic pain   URINE MICROSCOPIC (W/UA) - Abnormal; Notable for the following components:    WBC 10-20 (*)     RBC 5-10 (*)     All other components within normal limits    Narrative:     Indication for culture:->Patient WITHOUT an indwelling Martin  catheter in place with new onset of Dysuria, Frequency,  Urgency, and/or Suprapubic pain   ESTIMATED GFR   LACTIC ACID   LACTIC ACID   BLOOD CULTURE    Narrative:     1 of 2 for Blood Culture x 2 sites order. Per Hospital  Policy: Only change Specimen Src: to \"Line\" if specified by  physician order.   BLOOD CULTURE    Narrative:     2 of 2 blood culture x2  Sites order. Per Hospital Policy:  Only change Specimen Src: to \"Line\" if specified by physician  order.   URINE CULTURE(NEW)    Narrative:     Indication for culture:->Patient WITHOUT an indwelling Martin  catheter in place with new onset of Dysuria, Frequency,  Urgency, and/or Suprapubic pain   PROTHROMBIN TIME   URINALYSIS   PROCALCITONIN   CORTISOL         ED COURSE  Vitals:    04/14/22 1716 04/14/22 1722 04/14/22 1834   BP: 159/96  131/77   Pulse: (!) 112     Resp: 18     Temp: 37.6 °C (99.6 °F)     TempSrc: Temporal     SpO2: 93%     Weight:  74.8 kg (165 lb)    Height: 1.803 m (5' 11\") 1.803 m (5' 11\")        Medications administered:  IVF, toradol, morphine    Old records personally reviewed:  He was sent here by Orthopedics today with concern for possible PSOAS abscess. He had sciatica symptoms, went to chiropractor for some manipulations, pain worse afterwards. MRI in last week showed signs of fluid in right PSOAS and cystic structures interpreted by radiology as PSOAS hematoma with associated hemorrhage, so he was sent in to rule out infection.    6:30 PM Patient seen and examined at bedside. The patient presents for right buttock and hip pain and  infection rule out of right PSOAS hematoma found on outside MRI. Ordered for " lactic acid once and every 2 hours, UA w/ culture if indicated, CBC w/ diff, CMP, blood culture x2, C Reactive Protein Quantitative (Non-Cardiac), Sed Rate to evaluate.    MEDICAL DECISION MAKING  I am relatively healthy patient who presents from [facility].  Patient has had fever at home however is afebrile here, tachycardic with otherwise reassuring vital signs.  Clinic with concern for possible psoas hematoma versus abscess following chiropractic manipulation a few weeks ago.  Exam is concerning for significant pain with range of motion of the hip as well as the petechial rash.  Labs show significant leukocytosis with a mild elevation of lactate.  He also has severe elevation of inflammatory markers concerning for infection.  He has no other symptoms of infectious source.  Chronic indwelling suprapubic catheter was replaced yesterday and urinalysis does not show significant signs of infection.    8:00 PM I discussed the case with Dr. Cleaning, orthopedic surgeon on-call.  He recommends IR guided drainage of the area in the morning.  We discussed possibly holding off on antibiotics however I am concerned due to signs of sepsis and petechial rash possibly indicating bacteremia therefore will cover with antibiotics prior to drainage.    8:45 PM I discussed the patient's case and the above findings with Dr. Moran (Hospitalist) who will assess the patient for hospitalization.       DISPOSITION:  Patient will be hospitalized by Dr. Moran in guarded condition.      IMPRESSION  (K68.12) Psoas abscess (HCC)    Results, diagnoses, and treatment options were discussed with the patient and/or family. Patient verbalized understanding of plan of care.           Karan LAI (Alan), am scribing for, and in the presence of, Ramona Sweet M.D..    Electronically signed by: Karan Hartley (Alan), 4/14/2022    Ramona LAI M.D. personally performed the services described in this documentation, as scribed by  Karan Hartley in my presence, and it is both accurate and complete.    The note accurately reflects work and decisions made by me.  Ramona Sweet M.D.  4/14/2022  9:04 PM

## 2022-04-15 NOTE — DIETARY
"Nutrition services: Day 1 of admit.  Gary Ramon is a 46 y.o. male with admitting DX of Psoas Abscess    Consult received for Malnutrition screening Tool: Weight loss of 2-13 lbs within past 1 month reported alongside poor appetite.     RD able to visit pt at bedside. Pt states has been having a decreased appetite related to pain. Pt suspects has lost 15 lbs within past 1 week as a result. States has also been bed bound. States is feeling hungry today, though aware of NPO status. Asks about brining in outside foods.     Assessment:  Height: 180.3 cm (5' 11\")  Weight: 74.8 kg (164 lb 14.5 oz)  Body mass index is 23 kg/m²., BMI classification: WNL  Diet/Intake: NPO    Evaluation:   1. Hx of sepsis, leukocytosis, chronic pain syndrome, thrombocytosis, hyperglycemia  2. IR consulted for drain placement-NPO for procedure  3. Pt intake PTA appears insufficient based on pt report, though unsure of deviation from baseline. Pt NPO since admission, NPO x 1 day. RD discussed high protein once diet is started. Pt is open to supplemental shakes if intake remains challenged, though discussed high protein options and snacks initially. Discussed outside food is fine once is placed on a diet order, rediscussed purpose of current NPO status. Encouraged notifying RN when plans to have outside food brought in.   4. Per chart review, weight hx provided below, though source unidentified  Wt Readings from Last 4 Encounters:   04/14/22 74.8 kg (164 lb 14.5 oz)   04/14/22 74.8 kg (165 lb)   04/05/22 81.6 kg (180 lb)   o Potential 8.6 kg weight loss observed within past ~2 weeks, which is an 8.6% loss. This is a high amount that is unlikely, though some weight loss anticipated based on pt's immobility.   5. Per nutrition focused physical assessment appears nourished per visual observation.   6. Skin: No pressure injuries noted   7. Pertinent labs: Glucose 117  8. Pertinent meds: senokot-refused, gabapentin-refused, vancomycin, lactated " ringers infusion   9. Last BM PTA    Malnutrition Risk: Does not meet criteria for malnutrition at this time. RD to re-evaluate as indicated     Recommendations/Plan:  1. Initiate oral diet as soon as medically feasible.   2. Encourage intake of meals once starting PO  3. Document intake of all meals and/or supplements as % taken in ADL's to provide interdisciplinary communication across all shifts.   4. Monitor weight.  5. Nutrition rep will continue to see patient for ongoing meal and snack preferences.     RD following

## 2022-04-15 NOTE — CARE PLAN
Problem: Pain - Standard  Goal: Alleviation of pain or a reduction in pain to the patient’s comfort goal  Outcome: Progressing     Problem: Hemodynamics  Goal: Patient's hemodynamics, fluid balance and neurologic status will be stable or improve  Outcome: Progressing     Problem: Fluid Volume  Goal: Fluid volume balance will be maintained  Outcome: Progressing   The patient is Stable - Low risk of patient condition declining or worsening       ABX given per orders, NPO since midnight in prep for procedure

## 2022-04-15 NOTE — ASSESSMENT & PLAN NOTE
This is Sepsis Present on admission  SIRS criteria identified on my evaluation include: Tachycardia, with heart rate greater than 90 BPM, Leukocytosis, with WBC greater than 12,000 and Bandemia, greater than 10% bands  Source is abscess  Sepsis protocol initiated  Fluid resuscitation ordered per protocol  Crystalloid Fluid Administration: Fluid resuscitation ordered per standard protocol - 30 mL/kg per current or ideal body weight  IV antibiotics as appropriate for source of sepsis  Reassessment: I have reassessed the patient's hemodynamic status    SIRS criteria being tachycardia with heart rate greater than 90, WBC greater than 12, source of infection  Sepsis.  Patient is also found to have MSSA bacteremia  , Continue IV Ancef, ID following  Resolved

## 2022-04-16 ENCOUNTER — APPOINTMENT (OUTPATIENT)
Dept: RADIOLOGY | Facility: MEDICAL CENTER | Age: 47
DRG: 871 | End: 2022-04-16
Attending: STUDENT IN AN ORGANIZED HEALTH CARE EDUCATION/TRAINING PROGRAM
Payer: OTHER MISCELLANEOUS

## 2022-04-16 PROBLEM — B95.61 MSSA BACTEREMIA: Status: ACTIVE | Noted: 2022-04-16

## 2022-04-16 PROBLEM — R78.81 MSSA BACTEREMIA: Status: ACTIVE | Noted: 2022-04-16

## 2022-04-16 LAB
ALBUMIN SERPL BCP-MCNC: 3 G/DL (ref 3.2–4.9)
BACTERIA UR CULT: NORMAL
BUN SERPL-MCNC: 13 MG/DL (ref 8–22)
CALCIUM SERPL-MCNC: 9.3 MG/DL (ref 8.5–10.5)
CHLORIDE SERPL-SCNC: 99 MMOL/L (ref 96–112)
CO2 SERPL-SCNC: 29 MMOL/L (ref 20–33)
CREAT SERPL-MCNC: 0.87 MG/DL (ref 0.5–1.4)
ERYTHROCYTE [DISTWIDTH] IN BLOOD BY AUTOMATED COUNT: 43.6 FL (ref 35.9–50)
GFR SERPLBLD CREATININE-BSD FMLA CKD-EPI: 106 ML/MIN/1.73 M 2
GLUCOSE SERPL-MCNC: 125 MG/DL (ref 65–99)
GRAM STN SPEC: NORMAL
HAV IGM SERPL QL IA: NORMAL
HBV CORE IGM SER QL: NORMAL
HBV SURFACE AG SER QL: NORMAL
HCT VFR BLD AUTO: 33.4 % (ref 42–52)
HCV AB SER QL: NORMAL
HGB BLD-MCNC: 10.8 G/DL (ref 14–18)
HIV 1+2 AB+HIV1 P24 AG SERPL QL IA: NORMAL
MCH RBC QN AUTO: 30.2 PG (ref 27–33)
MCHC RBC AUTO-ENTMCNC: 32.3 G/DL (ref 33.7–35.3)
MCV RBC AUTO: 93.3 FL (ref 81.4–97.8)
PHOSPHATE SERPL-MCNC: 2.9 MG/DL (ref 2.5–4.5)
PLATELET # BLD AUTO: 392 K/UL (ref 164–446)
PMV BLD AUTO: 8.3 FL (ref 9–12.9)
POTASSIUM SERPL-SCNC: 3.6 MMOL/L (ref 3.6–5.5)
RBC # BLD AUTO: 3.58 M/UL (ref 4.7–6.1)
SIGNIFICANT IND 70042: NORMAL
SIGNIFICANT IND 70042: NORMAL
SITE SITE: NORMAL
SITE SITE: NORMAL
SODIUM SERPL-SCNC: 138 MMOL/L (ref 135–145)
SOURCE SOURCE: NORMAL
SOURCE SOURCE: NORMAL
WBC # BLD AUTO: 9.9 K/UL (ref 4.8–10.8)

## 2022-04-16 PROCEDURE — 85027 COMPLETE CBC AUTOMATED: CPT

## 2022-04-16 PROCEDURE — A9270 NON-COVERED ITEM OR SERVICE: HCPCS | Performed by: STUDENT IN AN ORGANIZED HEALTH CARE EDUCATION/TRAINING PROGRAM

## 2022-04-16 PROCEDURE — 87040 BLOOD CULTURE FOR BACTERIA: CPT

## 2022-04-16 PROCEDURE — 87077 CULTURE AEROBIC IDENTIFY: CPT

## 2022-04-16 PROCEDURE — 36415 COLL VENOUS BLD VENIPUNCTURE: CPT

## 2022-04-16 PROCEDURE — 700111 HCHG RX REV CODE 636 W/ 250 OVERRIDE (IP)

## 2022-04-16 PROCEDURE — 770001 HCHG ROOM/CARE - MED/SURG/GYN PRIV*

## 2022-04-16 PROCEDURE — C1726 CATH, BAL DIL, NON-VASCULAR: HCPCS

## 2022-04-16 PROCEDURE — 87389 HIV-1 AG W/HIV-1&-2 AB AG IA: CPT

## 2022-04-16 PROCEDURE — 700111 HCHG RX REV CODE 636 W/ 250 OVERRIDE (IP): Performed by: INTERNAL MEDICINE

## 2022-04-16 PROCEDURE — 87205 SMEAR GRAM STAIN: CPT

## 2022-04-16 PROCEDURE — 80074 ACUTE HEPATITIS PANEL: CPT

## 2022-04-16 PROCEDURE — 87070 CULTURE OTHR SPECIMN AEROBIC: CPT

## 2022-04-16 PROCEDURE — 700111 HCHG RX REV CODE 636 W/ 250 OVERRIDE (IP): Performed by: RADIOLOGY

## 2022-04-16 PROCEDURE — 87186 SC STD MICRODIL/AGAR DIL: CPT

## 2022-04-16 PROCEDURE — 0W9H30Z DRAINAGE OF RETROPERITONEUM WITH DRAINAGE DEVICE, PERCUTANEOUS APPROACH: ICD-10-PCS | Performed by: RADIOLOGY

## 2022-04-16 PROCEDURE — 99255 IP/OBS CONSLTJ NEW/EST HI 80: CPT | Performed by: INTERNAL MEDICINE

## 2022-04-16 PROCEDURE — 80069 RENAL FUNCTION PANEL: CPT

## 2022-04-16 PROCEDURE — 700102 HCHG RX REV CODE 250 W/ 637 OVERRIDE(OP): Performed by: STUDENT IN AN ORGANIZED HEALTH CARE EDUCATION/TRAINING PROGRAM

## 2022-04-16 PROCEDURE — 99233 SBSQ HOSP IP/OBS HIGH 50: CPT | Performed by: HOSPITALIST

## 2022-04-16 PROCEDURE — 700111 HCHG RX REV CODE 636 W/ 250 OVERRIDE (IP): Performed by: STUDENT IN AN ORGANIZED HEALTH CARE EDUCATION/TRAINING PROGRAM

## 2022-04-16 RX ORDER — SODIUM CHLORIDE 9 MG/ML
500 INJECTION, SOLUTION INTRAVENOUS
Status: ACTIVE | OUTPATIENT
Start: 2022-04-16 | End: 2022-04-16

## 2022-04-16 RX ORDER — CEFAZOLIN SODIUM 2 G/100ML
2 INJECTION, SOLUTION INTRAVENOUS EVERY 8 HOURS
Status: DISCONTINUED | OUTPATIENT
Start: 2022-04-16 | End: 2022-04-19

## 2022-04-16 RX ORDER — MIDAZOLAM HYDROCHLORIDE 1 MG/ML
INJECTION INTRAMUSCULAR; INTRAVENOUS
Status: COMPLETED
Start: 2022-04-16 | End: 2022-04-16

## 2022-04-16 RX ORDER — ONDANSETRON 2 MG/ML
4 INJECTION INTRAMUSCULAR; INTRAVENOUS PRN
Status: ACTIVE | OUTPATIENT
Start: 2022-04-16 | End: 2022-04-16

## 2022-04-16 RX ORDER — LIDOCAINE HYDROCHLORIDE 10 MG/ML
INJECTION, SOLUTION INFILTRATION; PERINEURAL
Status: DISPENSED
Start: 2022-04-16 | End: 2022-04-16

## 2022-04-16 RX ORDER — MIDAZOLAM HYDROCHLORIDE 1 MG/ML
.5-2 INJECTION INTRAMUSCULAR; INTRAVENOUS PRN
Status: ACTIVE | OUTPATIENT
Start: 2022-04-16 | End: 2022-04-16

## 2022-04-16 RX ADMIN — GABAPENTIN 300 MG: 300 CAPSULE ORAL at 17:37

## 2022-04-16 RX ADMIN — FENTANYL CITRATE 25 MCG: 50 INJECTION, SOLUTION INTRAMUSCULAR; INTRAVENOUS at 12:31

## 2022-04-16 RX ADMIN — MIDAZOLAM HYDROCHLORIDE 1 MG: 1 INJECTION, SOLUTION INTRAMUSCULAR; INTRAVENOUS at 12:31

## 2022-04-16 RX ADMIN — CEFAZOLIN SODIUM 2 G: 2 INJECTION, SOLUTION INTRAVENOUS at 14:44

## 2022-04-16 RX ADMIN — SENNOSIDES AND DOCUSATE SODIUM 2 TABLET: 50; 8.6 TABLET ORAL at 06:10

## 2022-04-16 RX ADMIN — MIDAZOLAM HYDROCHLORIDE 1 MG: 1 INJECTION, SOLUTION INTRAMUSCULAR; INTRAVENOUS at 12:36

## 2022-04-16 RX ADMIN — FENTANYL CITRATE 25 MCG: 50 INJECTION, SOLUTION INTRAMUSCULAR; INTRAVENOUS at 12:36

## 2022-04-16 RX ADMIN — OXYCODONE HYDROCHLORIDE 10 MG: 10 TABLET ORAL at 01:18

## 2022-04-16 RX ADMIN — OXYCODONE HYDROCHLORIDE 10 MG: 10 TABLET ORAL at 20:51

## 2022-04-16 RX ADMIN — MORPHINE SULFATE 4 MG: 4 INJECTION INTRAVENOUS at 03:01

## 2022-04-16 RX ADMIN — OXYCODONE HYDROCHLORIDE 10 MG: 10 TABLET ORAL at 23:52

## 2022-04-16 RX ADMIN — OXYCODONE HYDROCHLORIDE 10 MG: 10 TABLET ORAL at 09:50

## 2022-04-16 RX ADMIN — SENNOSIDES AND DOCUSATE SODIUM 2 TABLET: 50; 8.6 TABLET ORAL at 17:37

## 2022-04-16 RX ADMIN — CEFAZOLIN SODIUM 2 G: 2 INJECTION, SOLUTION INTRAVENOUS at 20:51

## 2022-04-16 RX ADMIN — OXYCODONE HYDROCHLORIDE 10 MG: 10 TABLET ORAL at 06:10

## 2022-04-16 RX ADMIN — OXYCODONE HYDROCHLORIDE 10 MG: 10 TABLET ORAL at 17:39

## 2022-04-16 RX ADMIN — GABAPENTIN 300 MG: 300 CAPSULE ORAL at 06:10

## 2022-04-16 RX ADMIN — CEFAZOLIN SODIUM 2 G: 2 INJECTION, SOLUTION INTRAVENOUS at 09:50

## 2022-04-16 ASSESSMENT — ENCOUNTER SYMPTOMS
ORTHOPNEA: 0
HEARTBURN: 0
FEVER: 0
BLURRED VISION: 0
COUGH: 0
PALPITATIONS: 0
HEADACHES: 0
FOCAL WEAKNESS: 0
ABDOMINAL PAIN: 0
DEPRESSION: 0
SORE THROAT: 0
EYE PAIN: 0
NAUSEA: 1
HEMOPTYSIS: 0
BACK PAIN: 1
DIARRHEA: 0
SPUTUM PRODUCTION: 0

## 2022-04-16 ASSESSMENT — PAIN DESCRIPTION - PAIN TYPE
TYPE: ACUTE PAIN

## 2022-04-16 NOTE — CARE PLAN
The patient is Stable - Low risk of patient condition declining or worsening    Shift Goals  Clinical Goals: NPO; IR drain  Patient Goals: get abcess drained  Family Goals: Over all care mgt    Progress made toward(s) clinical / shift goals:  Patient pain is well controlled with scheduled and pRn medications. Patient calls appropriately.  Patient resting in bed with belongings and call light within reach.  Bed is locked and in lowest position.  Problem: Pain - Standard  Goal: Alleviation of pain or a reduction in pain to the patient’s comfort goal  Outcome: Progressing     Problem: Knowledge Deficit - Standard  Goal: Patient and family/care givers will demonstrate understanding of plan of care, disease process/condition, diagnostic tests and medications  Outcome: Progressing     Problem: Hemodynamics  Goal: Patient's hemodynamics, fluid balance and neurologic status will be stable or improve  Outcome: Progressing     Problem: Fluid Volume  Goal: Fluid volume balance will be maintained  Outcome: Progressing       Patient is not progressing towards the following goals:

## 2022-04-16 NOTE — ASSESSMENT & PLAN NOTE
Blood culture obtained on 4/14/2020 grew MSSA.  Repeat blood culture  On 4/16 NGTD echocardiogram did not show any vegetation.  ID following and recs MR CTL spine; ordered   Likely the source of the infection is from the psoas abscess.  PIcc line placed   4/20- switched to daptomycin

## 2022-04-16 NOTE — PROGRESS NOTES
@ 3424 Paged for M.D. concerning report that patient's culture was positive for for staph aureus. Received call back from Sheikh Morgan JEFFERSON [facility] ist. M.D. was updated and no change in orders.

## 2022-04-16 NOTE — OR SURGEON
Immediate Post- Operative Note        Findings: R iliacus abscess.      Procedure(s): CT guided drain placement.       Estimated Blood Loss: Less than 5 ml        Complications: None            4/16/2022     1247 PM     Hitesh Skelton M.D.

## 2022-04-16 NOTE — PROGRESS NOTES
Hospital Medicine Daily Progress Note    Date of Service  4/16/2022    Chief Complaint  Gary Ramon is a 46 y.o. male admitted 4/14/2022 with   Chief Complaint   Patient presents with   • Sent by MD Dr. Eb Alcazar from [facility] sent patient over to rule out infection r/t large hematoma of psoas muscle   • Hip Pain     Hip, lower back, and buttocks pain since march. Seen by many specialists.          Hospital Course  This is a 46 year-old male with a past medical significant for chronic sciatica on the right side went to chiropractor with hope that his pain will get relieved.  When he went to chiropractor with some manipulation he was noted to have severe pain to the extent that he is unable to move.    Patient went to spine surgery and Dr Romero recommended following up with Dr. Alcazar.  He had an MRI on 4/7/2022 showing significant fluid within the psoas and iliac is muscle bellies as well as posteriorly near the piriformis concerning for hematoma or abscess.  This patient was sent to ER by orthopedic surgery.    Also reports associated with fever is 102 with the last 4 days.  Upon presentation in ER, is noted to be tachycardic with heart rate of 112, WBC 16.9, lactic acid 2.1, CRP 14.1, patient had a suprapubic catheter, which will be changed.    Blood Cx X 2 was pending.  Patient was started on broad-spectrum antibiotics including vancomycin and Rocephin.  IR guided drainage was ordered, pending.    Interval events:  -- No acute events overnight, patient complained of being nauseous, will provide Zofran.  Continue to complain of the pain on the right hip and buttock area. This gets worse with movement.  --Patient has been stable, heart rate 83-91.  Blood pressure improved, 99/61  --Trend lactic acid.  Unfortunately no blood work was obtained at the time of dictation  Depending upon the culture and sensitivity will consider calling ID.  Plan of care has been discussed with patient, nursing staff, case  management    4/16:  -- No acute events overnight.  Blood cultures growing MSSA, antibiotics changed to Ancef..  Echocardiogram did not show any vegetation.  Patient does have a psoas abscess on CT scan of the abdomen and pelvis.  Patient is n.p.o., will be going for IR guided drainage.  -- Continue to complain of pain in his right pelvic area.  -- Currently n.p.o.    I have personally seen and examined the patient at bedside. I discussed the plan of care with patient, bedside RN, charge RN and .    Consultants/Specialty  IR  Code Status  Full Code    Disposition  Patient is NOT MEDICALLY CLEARED for discharge.   Anticipate discharge to to home with close outpatient follow-up.  I have placed the appropriate orders for post-discharge needs.    Review of Systems  Review of Systems   Constitutional: Positive for malaise/fatigue. Negative for fever.   HENT: Negative for congestion and sore throat.    Eyes: Negative for blurred vision and pain.   Respiratory: Negative for cough, hemoptysis and sputum production.    Cardiovascular: Negative for chest pain, palpitations, orthopnea and leg swelling.   Gastrointestinal: Positive for nausea. Negative for abdominal pain, diarrhea and heartburn.   Genitourinary: Negative for dysuria.   Musculoskeletal: Positive for back pain and joint pain.   Neurological: Negative for focal weakness and headaches.   Psychiatric/Behavioral: Negative for depression.   All other systems reviewed and are negative.       Physical Exam  Temp:  [35.9 °C (96.7 °F)-36.9 °C (98.5 °F)] 36.9 °C (98.4 °F)  Pulse:  [68-94] 77  Resp:  [16-18] 18  BP: (104-124)/(67-75) 104/70  SpO2:  [91 %-96 %] 96 %    Physical Exam  Vitals and nursing note reviewed.   Constitutional:       Appearance: Normal appearance.   HENT:      Head: Atraumatic.   Eyes:      Extraocular Movements: Extraocular movements intact.      Pupils: Pupils are equal, round, and reactive to light.   Cardiovascular:      Rate and  Rhythm: Normal rate.      Pulses: Normal pulses.   Pulmonary:      Effort: No respiratory distress.      Breath sounds: Normal breath sounds.   Abdominal:      General: Bowel sounds are normal. There is no distension.      Palpations: Abdomen is soft.      Tenderness: There is no abdominal tenderness.   Musculoskeletal:      Cervical back: Neck supple.      Right lower leg: No edema.      Left lower leg: No edema.      Comments: Decreased ROm in Right side 2/2 pain  TTP on palpation   Skin:     General: Skin is warm.   Neurological:      Mental Status: He is alert and oriented to person, place, and time.      Cranial Nerves: No cranial nerve deficit.   Psychiatric:         Mood and Affect: Mood normal.         Fluids    Intake/Output Summary (Last 24 hours) at 4/16/2022 0950  Last data filed at 4/16/2022 0711  Gross per 24 hour   Intake 240 ml   Output 1700 ml   Net -1460 ml       Laboratory  Recent Labs     04/14/22  1830 04/15/22  0935 04/16/22  0539   WBC 16.9* 13.4* 9.9   RBC 3.97* 3.35* 3.58*   HEMOGLOBIN 12.1* 10.1* 10.8*   HEMATOCRIT 36.7* 31.2* 33.4*   MCV 92.4 93.1 93.3   MCH 30.5 30.1 30.2   MCHC 33.0* 32.4* 32.3*   RDW 43.4 43.7 43.6   PLATELETCT 452* 357 392   MPV 8.3* 8.3* 8.3*     Recent Labs     04/14/22  1830 04/15/22  0935 04/16/22  0539   SODIUM 137 134* 138   POTASSIUM 4.1 4.0 3.6   CHLORIDE 100 99 99   CO2 23 25 29   GLUCOSE 117* 106* 125*   BUN 16 16 13   CREATININE 0.95 0.88 0.87   CALCIUM 9.4 8.9 9.3     Recent Labs     04/14/22  2110   INR 1.25*               Imaging  CT-ABDOMEN-PELVIS WITH   Final Result      1.  Right posterior/inferior psoas muscle fluid collection which extends into the anterior/inferior iliacus muscle and measures 6.5 x 5 x 2.5 cm in size. This likely represents abscess versus hematoma. There is also asymmetric enlargement of the right    psoas and iliac vessels consistent with myositis.      2.  Sclerosis and erosive change of the right SI joint. Consideration should be  given for inflammatory or infectious sacroiliitis.      3.  Bibasilar atelectasis.      EC-ECHOCARDIOGRAM COMPLETE W/O CONT   Final Result      IR-CONSULT AND TREAT    (Results Pending)        Assessment/Plan  * Psoas abscess (HCC)- (present on admission)  Assessment & Plan  Abscess versus hematoma    Described abdomen pelvis consistent with psoas abscess, currently n.p.o., IR called for IR guided drain placement  Woill follow cx  Continue iv abx    Sepsis (HCC)  Assessment & Plan  This is Sepsis Present on admission  SIRS criteria identified on my evaluation include: Tachycardia, with heart rate greater than 90 BPM, Leukocytosis, with WBC greater than 12,000 and Bandemia, greater than 10% bands  Source is abscess  Sepsis protocol initiated  Fluid resuscitation ordered per protocol  Crystalloid Fluid Administration: Fluid resuscitation ordered per standard protocol - 30 mL/kg per current or ideal body weight  IV antibiotics as appropriate for source of sepsis  Reassessment: I have reassessed the patient's hemodynamic status    SIRS criteria being tachycardia with heart rate greater than 90, WBC greater than 12, source of infection  Sepsis.  Patient is also found to have MSSA bacteremia  , Continue IV Ancef, ID consulted          MSSA bacteremia  Assessment & Plan  Blood culture obtained on 4/14/2020 grew MSSA.  Repeat blood culture ordered, echocardiogram did not show any vegetation.  ID consulted  Likely the source of the infection is from the psoas abscess.  Continue Ancef    Normocytic anemia  Assessment & Plan  Monitor hemoglobin adequate, today is noted to be 10.8    Hyperglycemia  Assessment & Plan  Hemoglobin A1c ordered,    Thrombocytosis  Assessment & Plan  Resolved, likely reactive secondary to infection    Chronic pain syndrome  Assessment & Plan  Multimodal Pain control    Leukocytosis  Assessment & Plan  Resolved down, monitor, continue IV antibiotics       VTE prophylaxis: SCDs/TEDs  Patient is going  for procedure

## 2022-04-16 NOTE — PROGRESS NOTES
IR/CT Note:      Pt presents to IR for psoas abscess drain placement with moderate sedation performed by Dr. Skelton.  Pt gcs 15, in nad.  Pt transferred self to IR table without incident to a supine position. Comfort and Safety measures in place.  Pt placed on cardiac and respiratory monitoring.  Pt prepped and draped in sterile fashion.     1217 pt consented by Dr. Skelton.    1232 Timeout completed, all team members agree.  Procedure begins.    1233 puncture right lateral pelvis area by Dr. Skelton.     1241 Drain placed rlq abd/pelvis area (lateral)  [employer]   10 F x 25 cm  Locking Pigtail  REF:  [id number]  LOT:  [id number]  EXP:  2/17/25    1243 Procedure complete.

## 2022-04-16 NOTE — CONSULTS
INFECTIOUS DISEASES INPATIENT CONSULT NOTE     Date of Service: 4/16/2022    Consult Requested By: Te Soni M.D.    Reason for Consultation: psoas abscess    History of Present Illness:   Gary Ramon is a 46 y.o. male admitted 4/14/2022 with several weeks h/o pain right low back and hip.  Initially pain was moderate and he was able to do ADLs and work. AFter he went to chiropractor for manipulation of RLE , the pain acutely worsened. On 4/7/2022 he had MRI right hip/Lspine by report (offical report not in Epic) concerning abscess/hematoma of the right psoas on MRI.  He has no recent skin infection. +mild psoriasis, no active lesions.  Not on steroids or DMARDS. Does have hypospadias recent suprapubic catheter  Seen at [facility] on 4/14/2022 and sent to ER. No fever +leukocytosis 16.9. Mild lactic acidosis  He also visited ER twice, no hematoma/abscess ED on CT. + fever to 102 X 4 days PTA..    Started on vancomycin and ceftriaxone.  No drainage done as yet  Infectious Diseases consulted for antibiotic recommendation and management      Review Of Systems:  Right hip and back pain  All other systems are reviewed and are negative     PMH:   Past Medical History:   Diagnosis Date   • Concussion    Prior left elbow septic arthritis (treated at [facility]) about 6 years ago-states on antibiotics prior to surgery and cultures were negative      PSH:  History reviewed. No pertinent surgical history.    FAMILY HX:  History reviewed. No pertinent family history.   +DM    SOCIAL HX:  Social History     Socioeconomic History   • Marital status:      Spouse name: Not on file   • Number of children: Not on file   • Years of education: Not on file   • Highest education level: Not on file   Occupational History   • Not on file   Tobacco Use   • Smoking status: Never Smoker   • Smokeless tobacco: Never Used   Vaping Use   • Vaping Use: Never used   Substance and Sexual Activity   • Alcohol use: Not Currently   • Drug  use: Yes     Types: Inhaled     Comment: marijuana occ   • Sexual activity: Not on file   Other Topics Concern   • Not on file   Social History Narrative   • Not on file     Social Determinants of Health     Financial Resource Strain: Not on file   Food Insecurity: Not on file   Transportation Needs: Not on file   Physical Activity: Not on file   Stress: Not on file   Social Connections: Not on file   Intimate Partner Violence: Not on file   Housing Stability: Not on file     Social History     Tobacco Use   Smoking Status Never Smoker   Smokeless Tobacco Never Used     Social History     Substance and Sexual Activity   Alcohol Use Not Currently       Allergies/Intolerances:  No Known Allergies      Other Current Medications:    Current Facility-Administered Medications:   •  ceFAZolin in dextrose (Ancef) IVPB premix 2 g, 2 g, Intravenous, Q8HRS, Promise Zuniga M.D., Stopped at 04/16/22 1020  •  acetaminophen (Tylenol) tablet 650 mg, 650 mg, Oral, Q6HRS PRN, Te Soni M.D.  •  ondansetron (ZOFRAN) syringe/vial injection 4 mg, 4 mg, Intravenous, Q4HRS PRN, Te Soni M.D., 4 mg at 04/15/22 1019  •  senna-docusate (PERICOLACE or SENOKOT S) 8.6-50 MG per tablet 2 Tablet, 2 Tablet, Oral, BID, 2 Tablet at 04/16/22 0610 **AND** polyethylene glycol/lytes (MIRALAX) PACKET 1 Packet, 1 Packet, Oral, QDAY PRN **AND** magnesium hydroxide (MILK OF MAGNESIA) suspension 30 mL, 30 mL, Oral, QDAY PRN **AND** bisacodyl (DULCOLAX) suppository 10 mg, 10 mg, Rectal, QDAY PRN, Boston Moran M.D.  •  lactated ringers infusion (BOLUS), 500 mL, Intravenous, Once PRN, Boston Moran M.D.  •  hydrALAZINE (APRESOLINE) injection 10 mg, 10 mg, Intravenous, Q4HRS PRN, Boston Moran M.D.  •  gabapentin (NEURONTIN) capsule 300 mg, 300 mg, Oral, TID, Boston Moran M.D., 300 mg at 04/16/22 0610  •  Pharmacy Consult Request ...Pain Management Review 1 Each, 1 Each, Other, PHARMACY TO DOSE, Boston Moran M.D.  •  oxyCODONE  "immediate-release (ROXICODONE) tablet 5 mg, 5 mg, Oral, Q3HRS PRN, 5 mg at 04/15/22 1832 **OR** oxyCODONE immediate release (ROXICODONE) tablet 10 mg, 10 mg, Oral, Q3HRS PRN, 10 mg at 22 0950 **OR** morphine 4 MG/ML injection 4 mg, 4 mg, Intravenous, Q3HRS PRN, Boston Moran M.D., 4 mg at 22 0301      Most Recent Vital Signs:  /70   Pulse 77   Temp 36.9 °C (98.4 °F) (Temporal)   Resp 18   Ht 1.803 m (5' 11\")   Wt 74.8 kg (164 lb 14.5 oz)   SpO2 96%   BMI 23.00 kg/m²   Temp  Av.9 °C (98.5 °F)  Min: 35.9 °C (96.7 °F)  Max: 37.6 °C (99.6 °F)    Physical Exam:  General: well-appearing, well nourished no acute distress  HEENT: NCAT, PERRLA, sclera anicteric, PERRL, EOMI,  no oral lesions Dentition good  Neck: supple, no lymphadenopathy  Chest: CTAB, unlabored.  Cardiac: RRR,  no m/r/g   Abdomen: + bowel sounds, soft, non-tender, non-distended  SP catheter in place  Extremities: No cyanosis, clubbing. no edema, 2+ pulses  Skin: no rashes No active psoriatic lesions  Neuro: Alert and oriented times 3, Speech fluent CN intact THOMPSON  Psych: Mood normal Affect normal    Pertinent Lab Results:  Recent Labs     04/14/22  1830 04/15/22  0935 22  0539   WBC 16.9* 13.4* 9.9      Recent Labs     04/14/22  1830 04/15/22  0935 22  0539   HEMOGLOBIN 12.1* 10.1* 10.8*   HEMATOCRIT 36.7* 31.2* 33.4*   MCV 92.4 93.1 93.3   MCH 30.5 30.1 30.2   PLATELETCT 452* 357 392         Recent Labs     04/14/22  1830 04/15/22  0935 22  0539   SODIUM 137 134* 138   POTASSIUM 4.1 4.0 3.6   CHLORIDE 100 99 99   CO2 23 25 29   CREATININE 0.95 0.88 0.87        Recent Labs     22  1830 04/15/22  0935 22  0539   ALBUMIN 3.9 3.0* 3.0*        Pertinent Micro:  Results     Procedure Component Value Units Date/Time    Blood Culture [[id number]]  (Abnormal) Collected: 22    Order Status: Completed Specimen: Blood from Peripheral Updated: 22 1121     Significant Indicator POS     Source " "BLD     Site PERIPHERAL     Culture Result Growth detected by Bactec instrument. 04/15/2022  21:24  Gram Stain: Gram positive cocci: Possible Staphylococcus sp.  Staphylococcus aureus (methicillin sensitive)  detected by PCR.  Susceptibility to follow.        Staphylococcus aureus  Susceptibilities in progress      Narrative:      CALL  [facility]  131 tel. [phone],  CALLED  131 tel. [phone] 04/15/2022, 21:26, RB PERF. RESULTS CALLED TO:  RN: [id number]  1 of 2 for Blood Culture x 2 sites order. Per Hospital  Policy: Only change Specimen Src: to \"Line\" if specified by  physician order.  Left AC    BLOOD CULTURE [[id number]]     Order Status: Sent Specimen: Blood from Peripheral     BLOOD CULTURE [[id number]]     Order Status: Sent Specimen: Blood from Peripheral     URINE CULTURE(NEW) [[id number]] Collected: 04/14/22 1957    Order Status: Completed Specimen: Urine Updated: 04/16/22 0207     Significant Indicator NEG     Source UR     Site -     Culture Result Culture in progress.    Narrative:      Indication for culture:->Patient WITHOUT an indwelling Martin  catheter in place with new onset of Dysuria, Frequency,  Urgency, and/or Suprapubic pain    MRSA By PCR (Amp) [[id number]] Collected: 04/15/22 0355    Order Status: Completed Specimen: Respirate from Nares Updated: 04/15/22 1842     MRSA by PCR Negative    Narrative:      Collected By: [id number] JAIMIE CARMONA  Per [facility] Lab Protocol    Blood Culture [[id number]] Collected: 04/14/22 1846    Order Status: Completed Specimen: Blood from Peripheral Updated: 04/15/22 0739     Significant Indicator NEG     Source BLD     Site PERIPHERAL     Culture Result No Growth  Note: Blood cultures are incubated for 5 days and  are monitored continuously.Positive blood cultures  are called to the RN and reported as soon as  they are identified.      Narrative:      2 of 2 blood culture x2  Sites order. Per Hospital Policy:  Only change Specimen Src: to \"Line\" if specified by " physician  order.  Right AC    Urinalysis [[id number]]     Order Status: Sent Specimen: Urine     URINALYSIS CULTURE, IF INDICATED [[id number]]  (Abnormal) Collected: 04/14/22 1957    Order Status: Completed Specimen: Urine Updated: 04/14/22 2046     Color Yellow     Character Clear     Specific Gravity 1.019     Ph 6.0     Glucose Negative mg/dL      Ketones Negative mg/dL      Protein Negative mg/dL      Bilirubin Negative     Urobilinogen, Urine 0.2     Nitrite Negative     Leukocyte Esterase Small     Occult Blood Moderate     Micro Urine Req Microscopic    Narrative:      Indication for culture:->Patient WITHOUT an indwelling Martin  catheter in place with new onset of Dysuria, Frequency,  Urgency, and/or Suprapubic pain        No results found for: BLOODCULTU, BLDCULT, BCHOLD     Studies:  TTE 4/15 neg    CT 4/15/2022  IMPRESSION:   1.  Right posterior/inferior psoas muscle fluid collection which extends into the anterior/inferior iliacus muscle and measures 6.5 x 5 x 2.5 cm in size. This likely represents abscess versus hematoma. There is also asymmetric enlargement of the right psoas and iliac vessels consistent with myositis.   2.  Sclerosis and erosive change of the right SI joint. Consideration should be given for inflammatory or infectious sacroiliitis.   3.  Bibasilar atelectasis.        IMPRESSION:   Psoas abscess  SI osteo  MSSA sepsis  Thrombocytosis resolved  Leukocytosis, resolved today      PLAN:   MSSA sepsis  Psoas abscess  Sacral OM/sacroiliitis  Afebrile since admitted  Lactic acidosis resolved  BCxs + MSSA 4/14  Repeat Bcxs every 48 hours until neg  PICC when Bcxs neg 48 hours  TTE unrevealing; recommend NAEEM if persistently positive blood cultures  Recommend MRI C/T/L spine  Change to cefazolin  Anticipate 6 weeks IV abx from date of negative blood cxs        Plan of care discussed with SANA Soni M.D.. Will continue to follow    Promise Zuniga M.D.

## 2022-04-16 NOTE — CARE PLAN
Problem: Pain - Standard  Goal: Alleviation of pain or a reduction in pain to the patient’s comfort goal  Outcome: Progressing   The patient is Stable - Low risk of patient condition declining or worsening    Shift Goals  Clinical Goals: pain control and ABX tx  Patient Goals: pain control  Family Goals: Over all care mgt    Progress made toward(s) clinical / shift goals:  Will continue to manage pain and keep patient up to date with the POC.     Patient is not progressing towards the following goals: n/a

## 2022-04-17 ENCOUNTER — APPOINTMENT (OUTPATIENT)
Dept: RADIOLOGY | Facility: MEDICAL CENTER | Age: 47
DRG: 871 | End: 2022-04-17
Attending: HOSPITALIST
Payer: OTHER MISCELLANEOUS

## 2022-04-17 PROBLEM — R73.9 HYPERGLYCEMIA: Status: RESOLVED | Noted: 2022-04-15 | Resolved: 2022-04-17

## 2022-04-17 LAB
BACTERIA BLD CULT: ABNORMAL
BACTERIA BLD CULT: ABNORMAL
SIGNIFICANT IND 70042: ABNORMAL
SITE SITE: ABNORMAL
SOURCE SOURCE: ABNORMAL

## 2022-04-17 PROCEDURE — 700102 HCHG RX REV CODE 250 W/ 637 OVERRIDE(OP): Performed by: STUDENT IN AN ORGANIZED HEALTH CARE EDUCATION/TRAINING PROGRAM

## 2022-04-17 PROCEDURE — 700117 HCHG RX CONTRAST REV CODE 255: Performed by: HOSPITALIST

## 2022-04-17 PROCEDURE — A9576 INJ PROHANCE MULTIPACK: HCPCS | Performed by: HOSPITALIST

## 2022-04-17 PROCEDURE — 99233 SBSQ HOSP IP/OBS HIGH 50: CPT | Performed by: HOSPITALIST

## 2022-04-17 PROCEDURE — 700111 HCHG RX REV CODE 636 W/ 250 OVERRIDE (IP): Performed by: STUDENT IN AN ORGANIZED HEALTH CARE EDUCATION/TRAINING PROGRAM

## 2022-04-17 PROCEDURE — 99233 SBSQ HOSP IP/OBS HIGH 50: CPT | Performed by: INTERNAL MEDICINE

## 2022-04-17 PROCEDURE — 72157 MRI CHEST SPINE W/O & W/DYE: CPT

## 2022-04-17 PROCEDURE — 770001 HCHG ROOM/CARE - MED/SURG/GYN PRIV*

## 2022-04-17 PROCEDURE — 72156 MRI NECK SPINE W/O & W/DYE: CPT

## 2022-04-17 PROCEDURE — 700111 HCHG RX REV CODE 636 W/ 250 OVERRIDE (IP): Performed by: HOSPITALIST

## 2022-04-17 PROCEDURE — 72158 MRI LUMBAR SPINE W/O & W/DYE: CPT

## 2022-04-17 PROCEDURE — A9270 NON-COVERED ITEM OR SERVICE: HCPCS | Performed by: STUDENT IN AN ORGANIZED HEALTH CARE EDUCATION/TRAINING PROGRAM

## 2022-04-17 PROCEDURE — 700111 HCHG RX REV CODE 636 W/ 250 OVERRIDE (IP): Performed by: INTERNAL MEDICINE

## 2022-04-17 RX ORDER — KETOROLAC TROMETHAMINE 30 MG/ML
30 INJECTION, SOLUTION INTRAMUSCULAR; INTRAVENOUS EVERY 6 HOURS PRN
Status: DISCONTINUED | OUTPATIENT
Start: 2022-04-17 | End: 2022-04-19

## 2022-04-17 RX ADMIN — CEFAZOLIN SODIUM 2 G: 2 INJECTION, SOLUTION INTRAVENOUS at 21:34

## 2022-04-17 RX ADMIN — CEFAZOLIN SODIUM 2 G: 2 INJECTION, SOLUTION INTRAVENOUS at 05:53

## 2022-04-17 RX ADMIN — KETOROLAC TROMETHAMINE 30 MG: 30 INJECTION, SOLUTION INTRAMUSCULAR at 12:51

## 2022-04-17 RX ADMIN — OXYCODONE HYDROCHLORIDE 10 MG: 10 TABLET ORAL at 08:17

## 2022-04-17 RX ADMIN — GABAPENTIN 300 MG: 300 CAPSULE ORAL at 05:53

## 2022-04-17 RX ADMIN — OXYCODONE HYDROCHLORIDE 10 MG: 10 TABLET ORAL at 14:03

## 2022-04-17 RX ADMIN — GADOTERIDOL 14 ML: 279.3 INJECTION, SOLUTION INTRAVENOUS at 16:16

## 2022-04-17 RX ADMIN — OXYCODONE 5 MG: 5 TABLET ORAL at 21:34

## 2022-04-17 RX ADMIN — GABAPENTIN 300 MG: 300 CAPSULE ORAL at 17:57

## 2022-04-17 RX ADMIN — SENNOSIDES AND DOCUSATE SODIUM 2 TABLET: 50; 8.6 TABLET ORAL at 05:53

## 2022-04-17 RX ADMIN — OXYCODONE HYDROCHLORIDE 10 MG: 10 TABLET ORAL at 03:52

## 2022-04-17 RX ADMIN — MORPHINE SULFATE 4 MG: 4 INJECTION INTRAVENOUS at 01:29

## 2022-04-17 RX ADMIN — SENNOSIDES AND DOCUSATE SODIUM 2 TABLET: 50; 8.6 TABLET ORAL at 17:57

## 2022-04-17 RX ADMIN — CEFAZOLIN SODIUM 2 G: 2 INJECTION, SOLUTION INTRAVENOUS at 14:04

## 2022-04-17 ASSESSMENT — ENCOUNTER SYMPTOMS
HEMOPTYSIS: 0
SORE THROAT: 0
DIARRHEA: 0
DEPRESSION: 0
NAUSEA: 1
PALPITATIONS: 0
WEAKNESS: 0
BACK PAIN: 1
VOMITING: 0
FEVER: 0
BLURRED VISION: 0
COUGH: 0
ABDOMINAL PAIN: 0
NAUSEA: 0
ORTHOPNEA: 0
HEADACHES: 0
EYE PAIN: 0
SPUTUM PRODUCTION: 0
ABDOMINAL PAIN: 1
FOCAL WEAKNESS: 0

## 2022-04-17 ASSESSMENT — PAIN DESCRIPTION - PAIN TYPE
TYPE: ACUTE PAIN

## 2022-04-17 NOTE — PROGRESS NOTES
Infectious Disease Progress Note    Author: Promise Zuniga M.D. Date & Time of service: 2022  12:55 PM    Chief Complaint:  psoas abscess   MSSA sepsis    Interval History:  46 y.o. male admitted 2022 with several weeks h/o pain right low back and hip found to have above   AF WBC 9.9 tolerating abx-has abd drain right No N/V. BP low, no symptoms    Labs Reviewed, Medications Reviewed and Wound Reviewed.    Review of Systems:  Review of Systems   Constitutional: Negative for fever.   Gastrointestinal: Positive for abdominal pain. Negative for nausea and vomiting.   All other systems reviewed and are negative.      Hemodynamics:  Temp (24hrs), Av.7 °C (98.1 °F), Min:36.3 °C (97.4 °F), Max:37.1 °C (98.8 °F)  Temperature: 37.1 °C (98.8 °F)  Pulse  Av  Min: 68  Max: 112   Blood Pressure: (!) 94/69       Physical Exam:  Physical Exam  Nursing note reviewed.   Constitutional:       General: He is not in acute distress.     Appearance: He is not ill-appearing or toxic-appearing.   HENT:      Nose: No rhinorrhea.      Mouth/Throat:      Pharynx: No oropharyngeal exudate.   Eyes:      General: No scleral icterus.     Extraocular Movements: Extraocular movements intact.      Pupils: Pupils are equal, round, and reactive to light.   Cardiovascular:      Rate and Rhythm: Normal rate.   Pulmonary:      Effort: Pulmonary effort is normal. No respiratory distress.      Breath sounds: No stridor.   Abdominal:      General: There is no distension.      Palpations: Abdomen is soft.      Tenderness: There is abdominal tenderness. There is no guarding or rebound.      Comments: Drain right   Genitourinary:     Comments: Sp cath  Musculoskeletal:      Cervical back: Neck supple. No rigidity.      Right lower leg: No edema.      Left lower leg: No edema.   Skin:     General: Skin is warm.      Coloration: Skin is not jaundiced.   Neurological:      General: No focal deficit present.      Mental Status: He is  alert and oriented to person, place, and time.   Psychiatric:         Mood and Affect: Mood normal.         Behavior: Behavior normal.         Meds:    Current Facility-Administered Medications:   •  ketorolac  •  ceFAZolin  •  acetaminophen  •  ondansetron  •  senna-docusate **AND** polyethylene glycol/lytes **AND** magnesium hydroxide **AND** bisacodyl  •  LR  •  hydrALAZINE  •  gabapentin  •  Pharmacy Consult Request  •  oxyCODONE immediate-release **OR** oxyCODONE immediate-release **OR** morphine injection    Labs:  Recent Labs     04/14/22  1830 04/15/22  0935 04/16/22  0539   WBC 16.9* 13.4* 9.9   RBC 3.97* 3.35* 3.58*   HEMOGLOBIN 12.1* 10.1* 10.8*   HEMATOCRIT 36.7* 31.2* 33.4*   MCV 92.4 93.1 93.3   MCH 30.5 30.1 30.2   RDW 43.4 43.7 43.6   PLATELETCT 452* 357 392   MPV 8.3* 8.3* 8.3*   NEUTSPOLYS 82.10* 78.60*  --    LYMPHOCYTES 12.30* 14.70*  --    MONOCYTES 2.80 4.10  --    EOSINOPHILS 1.70 1.90  --    BASOPHILS 0.30 0.30  --      Recent Labs     04/14/22  1830 04/15/22  0935 04/16/22  0539   SODIUM 137 134* 138   POTASSIUM 4.1 4.0 3.6   CHLORIDE 100 99 99   CO2 23 25 29   GLUCOSE 117* 106* 125*   BUN 16 16 13     Recent Labs     04/14/22  1830 04/15/22  0935 04/16/22  0539   ALBUMIN 3.9 3.0* 3.0*   TBILIRUBIN 0.3 0.4  --    ALKPHOSPHAT 89 68  --    TOTPROTEIN 8.2 6.4  --    ALTSGPT 45 28  --    ASTSGOT 27 14  --    CREATININE 0.95 0.88 0.87       Imaging:  CT-ABDOMEN-PELVIS WITH    Result Date: 4/15/2022  4/15/2022 6:48 PM HISTORY/REASON FOR EXAM:  Abdominal distention and pain.. TECHNIQUE/EXAM DESCRIPTION:   CT scan of the abdomen and pelvis with contrast. Contrast-enhanced helical scanning was obtained from the diaphragmatic domes through the pubic symphysis following the bolus administration of nonionic contrast without complication. 100 mL of Omnipaque 350 nonionic contrast was administered without complication. Low dose optimization technique was utilized for this CT exam including automated  exposure control and adjustment of the mA and/or kV according to patient size. COMPARISON: No prior studies available. FINDINGS: Lower Chest: There is bibasilar atelectasis.. Liver: Fatty change. Spleen: Enlarged. Pancreas: Unremarkable. Gallbladder: No calcified stones. Biliary: Nondilated. Adrenal glands: Normal. Kidneys: Unremarkable without hydronephrosis. Bowel: No obstruction or acute inflammation. Lymph nodes: No adenopathy. Vasculature: Unremarkable. Musculoskeletal: There is stranding attenuation with a fluid collection located within the posterior right psoas muscle measuring 6.5 x 5 x 2.5 cm in size. This extends posterior and inferior from the psoas muscle also into the anterior aspect of the right iliacus muscle. There is some surrounding stranding in attenuation. There is asymmetric enlargement of the right psoas and iliac muscles. Pelvis: No adenopathy or free fluid. There is a Martin catheter within the urinary bladder. There is some ill-definition of the right SI joint with erosive change and sclerosis suggesting either inflammatory or infectious right sacroiliitis.     1.  Right posterior/inferior psoas muscle fluid collection which extends into the anterior/inferior iliacus muscle and measures 6.5 x 5 x 2.5 cm in size. This likely represents abscess versus hematoma. There is also asymmetric enlargement of the right psoas and iliac vessels consistent with myositis. 2.  Sclerosis and erosive change of the right SI joint. Consideration should be given for inflammatory or infectious sacroiliitis. 3.  Bibasilar atelectasis.    CT-DRAIN-RETROPERITONEAL    Result Date: 4/16/2022 4/16/2022 12:12 PM HISTORY/REASON FOR EXAM:  drainage of psoas abscess/hematoma. TECHNIQUE/EXAM DESCRIPTION: RLQ abscess drainage with CT guidance. Low dose optimization technique was utilized for this CT exam including automated exposure control and adjustment of the mA and/or kV according to patient size. PROCEDURE: Informed  consent was obtained. Moderate sedation with Fentanyl and Versed was administered during the procedure with appropriate continuous patient monitoring by the radiology nurse. Intraservice duration: 30 minutes Localizing CT images were obtained with the patient in supine position. The skin was prepped with ChloraPrep and draped in a sterile fashion. Following local anesthesia with 1% Lidocaine, a 17 guiding needle was placed and needle path confirmed with CT. A guidewire was placed and following serial tract dilatation, a 10 Macedonian pigtail locking catheter was placed. A specimen was collected and submitted for culture and sensitivity and Gram stain. Total of 16 cc of bloody purulent fluid was drained. The catheter was secured to the skin and connected to suction bulb drainage. Final CT images were obtained documenting catheter position. The patient tolerated the procedure well with no evidence of complication. COMPARISON: None available. FINDINGS: The final CT images show satisfactory catheter position within the target collection.     1.  CT guided retroperitoneal right lower quadrant catheter drainage. 2.  The current plan is to obtain a follow-up CT scan in 5-7 days.    DX-LUMBAR SPINE-4+ VIEWS    Result Date: 4/5/2022  Radiographs of the lumbar spine demonstrate well-preserved disc spaces no evidence of fracture no spondylolisthesis.    EC-ECHOCARDIOGRAM COMPLETE W/O CONT    Result Date: 4/15/2022  Transthoracic Echo Report Echocardiography Laboratory CONCLUSIONS Normal transthoracic echocardiogram. HLV EF:  48    % FINDINGS Left Ventricle Normal left ventricular chamber size. Normal left ventricular wall thickness. Normal left ventricular systolic function. The left ventricular ejection fraction is visually estimated to be 55%. Normal regional wall motion. Normal diastolic function. Right Ventricle The right ventricle is normal in size and systolic function. Right Atrium The right atrium is normal in size. Normal  "inferior vena cava size and inspiratory collapse. Left Atrium The left atrium is normal in size. Left atrial volume index is 16 mL/sq m. Mitral Valve Structurally normal mitral valve without significant stenosis or regurgitation. Aortic Valve Structurally normal aortic valve without significant stenosis or regurgitation. Tricuspid Valve Structurally normal tricuspid valve without significant stenosis or regurgitation. Unable to estimate right ventricular systolic pressure due to an inadequate tricuspid regurgitant jet. Pulmonic Valve No pulmonic stenosis. Trace pulmonic insufficiency. Pericardium Normal pericardium without effusion. Aorta Normal aortic root for body surface area. The ascending aorta diameter is 3.3 cm. Héctor Hancock MD (Electronically Signed) Final Date:     15 April 2022                 15:16    DX-HIP-UNILATERAL-W PELVIS-3 VIEWS RIGHT    Result Date: 4/5/2022  Radiographs of the pelvis and the right hip were independently reviewed did not show any evidence of fracture no lesions are noted.  He does have bilateral cam lesions with mild to moderate bilateral arthritic changes..      Micro:  Results     Procedure Component Value Units Date/Time    Blood Culture [[id number]]  (Abnormal)  (Susceptibility) Collected: 04/14/22 1830    Order Status: Completed Specimen: Blood from Peripheral Updated: 04/17/22 1022     Significant Indicator POS     Source BLD     Site PERIPHERAL     Culture Result Growth detected by Bactec instrument. 04/15/2022  21:24  Staphylococcus aureus (methicillin sensitive)  detected by PCR.        Staphylococcus aureus  This isolate is presumed to be clindamycin resistant based on  detection of inducible resistance.      Narrative:      CALL  [facility]  131 tel. [phone],  CALLED  131 tel. [phone] 04/15/2022, 21:26, RB PERF. RESULTS CALLED TO:  RN: [id number]  1 of 2 for Blood Culture x 2 sites order. Per Hospital  Policy: Only change Specimen Src: to \"Line\" if specified by  physician " "order.  Left AC    Susceptibility     Staphylococcus aureus (1)     Antibiotic Interpretation Microscan   Method Status    Azithromycin Resistant >4 mcg/mL CLEMENTINA Final    Clindamycin Resistant <=0.25 mcg/mL [facility]MENT[facility] Final    Cefazolin Sensitive <=8 mcg/mL [facility]INA Final    Cefepime Sensitive <=4 mcg/mL CLEMENTINA Final    Ceftaroline Sensitive <=0.5 mcg/mL CLEMENTINA Final    Daptomycin Sensitive 1 mcg/mL CLEMENTINA Final    Erythromycin Resistant >4 mcg/mL CLEMENTINA Final    Ampicillin/sulbactam Sensitive <=8/4 mcg/mL CLEMENTINA Final    Vancomycin Sensitive 1 mcg/mL CLEMENTINA Final    Oxacillin Sensitive <=0.25 mcg/mL CLEMENTINA Final    Pip/Tazobactam Sensitive <=8 mcg/mL CLEMENTINA Final    Trimeth/Sulfa Sensitive <=0.5/9.5 mcg/mL CLEMENTINA Final    Tetracycline Sensitive <=4 mcg/mL CLEMENTINA Final                   BLOOD CULTURE [[id number]] Collected: 04/16/22 1143    Order Status: Completed Specimen: Blood from Peripheral Updated: 04/17/22 0855     Significant Indicator NEG     Source BLD     Site PERIPHERAL     Culture Result No Growth  Note: Blood cultures are incubated for 5 days and  are monitored continuously.Positive blood cultures  are called to the RN and reported as soon as  they are identified.      Narrative:      Per Hospital Policy: Only change Specimen Src: to \"Line\" if  specified by physician order.  Right AC    BLOOD CULTURE [[id number]] Collected: 04/16/22 1143    Order Status: Completed Specimen: Blood from Peripheral Updated: 04/17/22 0855     Significant Indicator NEG     Source BLD     Site PERIPHERAL     Culture Result No Growth  Note: Blood cultures are incubated for 5 days and  are monitored continuously.Positive blood cultures  are called to the RN and reported as soon as  they are identified.      Narrative:      Per Hospital Policy: Only change Specimen Src: to \"Line\" if  specified by physician order.  Right Hand    URINE CULTURE(NEW) [[id number]] Collected: 04/14/22 1957    Order Status: Completed Specimen: Urine Updated: 04/16/22 2130     Significant Indicator " NEG     Source UR     Site -     Culture Result Usual skin nadiya <10,000 cfu/mL    Narrative:      Indication for culture:->Patient WITHOUT an indwelling Martin  catheter in place with new onset of Dysuria, Frequency,  Urgency, and/or Suprapubic pain    GRAM STAIN [[id number]] Collected: 04/16/22 1241    Order Status: Completed Specimen: Wound Updated: 04/16/22 1855     Significant Indicator .     Source WND     Site Right Psoas Abscess     Gram Stain Result Many WBCs.  Many Gram positive cocci.      Narrative:      Collected By: [id number] JESSY CARMONA  Psoas abscess right  Aerobic and Anaerobic please  Collected By: [id number] JESSY CARMONA    CULTURE WOUND W/ GRAM STAIN [[id number]] Collected: 04/16/22 1241    Order Status: No result Specimen: Wound Updated: 04/16/22 1854     Significant Indicator NEG     Source WND     Site Right Psoas Abscess     Culture Result -     Gram Stain Result Many WBCs.  Many Gram positive cocci.      Narrative:      Collected By: [id number] JESSY CARMONA  Psoas abscess right  Aerobic and Anaerobic please  Collected By: [id number] JESSY CARMONA    FLUID CULTURE W/GRAM STAIN [[id number]] Collected: 04/16/22 1241    Order Status: Canceled Specimen: Other Body Fluid     MRSA By PCR (Amp) [[id number]] Collected: 04/15/22 0355    Order Status: Completed Specimen: Respirate from Nares Updated: 04/15/22 1842     MRSA by PCR Negative    Narrative:      Collected By: [id number] JAIMIE CARMONA  Per [facility] Protocol    Blood Culture [[id number]] Collected: 04/14/22 1846    Order Status: Completed Specimen: Blood from Peripheral Updated: 04/15/22 0739     Significant Indicator NEG     Source BLD     Site PERIPHERAL     Culture Result No Growth  Note: Blood cultures are incubated for 5 days and  are monitored continuously.Positive blood cultures  are called to the RN and reported as soon as  they are identified.      Narrative:      2 of 2 blood culture x2  Sites order. Per Hospital Policy:  Only change Specimen Src: to  "\"Line\" if specified by physician  order.  Right AC    Urinalysis [[id number]]     Order Status: Sent Specimen: Urine     URINALYSIS CULTURE, IF INDICATED [[id number]]  (Abnormal) Collected: 04/14/22 1957    Order Status: Completed Specimen: Urine Updated: 04/14/22 2046     Color Yellow     Character Clear     Specific Gravity 1.019     Ph 6.0     Glucose Negative mg/dL      Ketones Negative mg/dL      Protein Negative mg/dL      Bilirubin Negative     Urobilinogen, Urine 0.2     Nitrite Negative     Leukocyte Esterase Small     Occult Blood Moderate     Micro Urine Req Microscopic    Narrative:      Indication for culture:->Patient WITHOUT an indwelling Martin  catheter in place with new onset of Dysuria, Frequency,  Urgency, and/or Suprapubic pain          Assessment:  Active Hospital Problems    Diagnosis    • *Psoas abscess (HCC) [K68.12]    • MSSA bacteremia [R78.81, B95.61]    • Sepsis (HCC) [A41.9]    • Leukocytosis [D72.829]    • Chronic pain syndrome [G89.4]    • Thrombocytosis [D75.839]    • Normocytic anemia [D64.9]      Psoas abscess  SI osteo  MSSA sepsis  Thrombocytosis resolved  Leukocytosis, resolved         PLAN:   MSSA sepsis  Psoas abscess  Sacral OM/sacroiliitis  Afebrile   Lactic acidosis resolved  BCxs + MSSA 4/14  Repeat Bcxs every 48 hours until neg-blood cultures 4/16 prelim neg  PICC when Bcxs neg 48 hours  TTE unrevealing; recommend NAEEM if persistently positive blood cultures  Recommend MRI C/T/L spine  Continue cefazolin  Anticipate 6 weeks IV abx from date of negative blood cxs          "

## 2022-04-17 NOTE — CARE PLAN
The patient is Stable - Low risk of patient condition declining or worsening    Shift Goals  Clinical Goals: pain control; BEJNAMIN drain flush  Patient Goals: shower; comfort  Family Goals: Over all care mgt    Progress made toward(s) clinical / shift goals:  Patient able to shower this shift.  Patient pain well controlled with scheduled and PRN medications.  Patient resting comfortably.   Problem: Pain - Standard  Goal: Alleviation of pain or a reduction in pain to the patient’s comfort goal  Outcome: Progressing     Problem: Knowledge Deficit - Standard  Goal: Patient and family/care givers will demonstrate understanding of plan of care, disease process/condition, diagnostic tests and medications  Outcome: Progressing     Problem: Hemodynamics  Goal: Patient's hemodynamics, fluid balance and neurologic status will be stable or improve  Outcome: Progressing     Problem: Fluid Volume  Goal: Fluid volume balance will be maintained  Outcome: Progressing       Patient is not progressing towards the following goals:

## 2022-04-17 NOTE — CARE PLAN
The patient is Stable - Low risk of patient condition declining or worsening    Shift Goals  Clinical Goals: pain control, BENJAMIN drain output/care, safety  Patient Goals: pain control, rest  Family Goals: Over all care mgt    Progress made toward(s) clinical / shift goals:    Problem: Pain - Standard  Goal: Alleviation of pain or a reduction in pain to the patient’s comfort goal  Outcome: Progressing     Problem: Knowledge Deficit - Standard  Goal: Patient and family/care givers will demonstrate understanding of plan of care, disease process/condition, diagnostic tests and medications  Outcome: Progressing     Problem: Urinary - Renal Perfusion  Goal: Ability to achieve and maintain adequate renal perfusion and functioning will improve  Outcome: Progressing       Patient is not progressing towards the following goals:

## 2022-04-17 NOTE — PROGRESS NOTES
[facility] Medicine Daily Progress Note    Date of Service  4/17/2022    Chief Complaint  Gary Ramon is a 46 y.o. male admitted 4/14/2022 with   Chief Complaint   Patient presents with   • Sent by MD Dr. Eb Alcazar from [facility] sent patient over to rule out infection r/t large hematoma of psoas muscle   • Hip Pain     Hip, lower back, and buttocks pain since march. Seen by many specialists.          Hospital Course  This is a 46 year-old male with a past medical significant for chronic sciatica on the right side went to chiropractor with hope that his pain will get relieved.  When he went to chiropractor with some manipulation he was noted to have severe pain to the extent that he is unable to move.    Patient went to spine surgery and Dr Romero recommended following up with Dr. Alcazar.  He had an MRI on 4/7/2022 showing significant fluid within the psoas and iliac is muscle bellies as well as posteriorly near the piriformis concerning for hematoma or abscess.  This patient was sent to ER by orthopedic surgery.    Also reports associated with fever is 102 with the last 4 days.  Upon presentation in ER, is noted to be tachycardic with heart rate of 112, WBC 16.9, lactic acid 2.1, CRP 14.1, patient had a suprapubic catheter, which will be changed.    Blood Cx X 2 on 4/14 grew SSA.  Repeat blood culture in 4-6/2022 pending.  Patient was started on broad-spectrum antibiotics including vancomycin and Rocephin.  Patient underwent IR guided drainage on 4/16/2022, culture pending.    ID consulted, patient antibiotics was changed to Ancef.  As per ID recommendation MRI of the cervical spine thoracic and lumbar spine has been ordered, pending.  ID will continue to follow the patient.    Interval events:  -- No acute events overnight, patient complained of being nauseous, will provide Zofran.  Continue to complain of the pain on the right hip and buttock area. This gets worse with movement.  --Patient has been stable, heart  rate 83-91.  Blood pressure improved, 99/61  --Trend lactic acid.  Unfortunately no blood work was obtained at the time of dictation  Depending upon the culture and sensitivity will consider calling ID.  Plan of care has been discussed with patient, nursing staff, case management    4/16:  -- No acute events overnight.  Blood cultures growing MSSA, antibiotics changed to Ancef..  Echocardiogram did not show any vegetation.  Patient does have a psoas abscess on CT scan of the abdomen and pelvis.  Patient is n.p.o., will be going for IR guided drainage.  -- Continue to complain of pain in his right pelvic area.  -- Currently n.p.o.    4/17:  -- No acute events overnight, vital sign has been stable, patient underwent IR guided drainage on 4/16, culture pending.  ID consulted, currently patient is on Ancef.  Repeat blood culture pending.  If repeat blood culture has been negative for 48 hours, will provide PICC line.  --Patient states that she hip his movement has been decreased on the right lower extremity secondary to pain.  MRI of the cervical, T-spine, lumbar spine has been ordered as per ID recommendation.  --Echocardiogram did not show any vegetation  --Continue IV Ancef  --Monitor drain output    I have personally seen and examined the patient at bedside. I discussed the plan of care with patient, bedside RN, charge RN and .    Consultants/Specialty  IR  Code Status  Full Code    Disposition  Patient is NOT MEDICALLY CLEARED for discharge.   Anticipate discharge to to home with close outpatient follow-up.  I have placed the appropriate orders for post-discharge needs.    Review of Systems  Review of Systems   Constitutional: Positive for malaise/fatigue.   HENT: Negative for congestion and sore throat.    Eyes: Negative for blurred vision and pain.   Respiratory: Negative for cough, hemoptysis and sputum production.    Cardiovascular: Negative for chest pain, palpitations, orthopnea and leg swelling.    Gastrointestinal: Positive for nausea. Negative for abdominal pain and diarrhea.   Genitourinary: Negative for dysuria and frequency.   Musculoskeletal: Positive for back pain and joint pain.   Neurological: Negative for focal weakness, weakness and headaches.   Psychiatric/Behavioral: Negative for depression.   All other systems reviewed and are negative.       Physical Exam  Temp:  [36.3 °C (97.4 °F)-37.1 °C (98.8 °F)] 37.1 °C (98.8 °F)  Pulse:  [68-90] 82  Resp:  [11-18] 18  BP: (105-119)/(63-84) 119/84  SpO2:  [90 %-98 %] 92 %    Physical Exam  Vitals and nursing note reviewed.   Constitutional:       Appearance: Normal appearance.   HENT:      Head: Atraumatic.   Eyes:      Extraocular Movements: Extraocular movements intact.      Pupils: Pupils are equal, round, and reactive to light.   Cardiovascular:      Rate and Rhythm: Normal rate.      Pulses: Normal pulses.   Pulmonary:      Effort: No respiratory distress.      Breath sounds: Normal breath sounds.   Abdominal:      General: Bowel sounds are normal. There is no distension.      Palpations: Abdomen is soft.      Tenderness: There is no abdominal tenderness.   Musculoskeletal:      Cervical back: Neck supple.      Right lower leg: No edema.      Left lower leg: No edema.      Comments: Decreased ROm in Right side 2/2 pain  TTP on palpation   Skin:     General: Skin is warm.   Neurological:      Mental Status: He is alert and oriented to person, place, and time.      Cranial Nerves: No cranial nerve deficit.      Comments: Patient stated that because of pain , he has difficulty movement with his leg    Psychiatric:         Mood and Affect: Mood normal.         Fluids    Intake/Output Summary (Last 24 hours) at 4/17/2022 0939  Last data filed at 4/17/2022 0400  Gross per 24 hour   Intake 350 ml   Output 3605 ml   Net -3255 ml       Laboratory  Recent Labs     04/14/22  1830 04/15/22  0935 04/16/22  0539   WBC 16.9* 13.4* 9.9   RBC 3.97* 3.35* 3.58*    HEMOGLOBIN 12.1* 10.1* 10.8*   HEMATOCRIT 36.7* 31.2* 33.4*   MCV 92.4 93.1 93.3   MCH 30.5 30.1 30.2   MCHC 33.0* 32.4* 32.3*   RDW 43.4 43.7 43.6   PLATELETCT 452* 357 392   MPV 8.3* 8.3* 8.3*     Recent Labs     04/14/22  1830 04/15/22  0935 04/16/22  0539   SODIUM 137 134* 138   POTASSIUM 4.1 4.0 3.6   CHLORIDE 100 99 99   CO2 23 25 29   GLUCOSE 117* 106* 125*   BUN 16 16 13   CREATININE 0.95 0.88 0.87   CALCIUM 9.4 8.9 9.3     Recent Labs     04/14/22  2110   INR 1.25*               Imaging  CT-DRAIN-RETROPERITONEAL   Final Result      1.  CT guided retroperitoneal right lower quadrant catheter drainage.   2.  The current plan is to obtain a follow-up CT scan in 5-7 days.      CT-ABDOMEN-PELVIS WITH   Final Result      1.  Right posterior/inferior psoas muscle fluid collection which extends into the anterior/inferior iliacus muscle and measures 6.5 x 5 x 2.5 cm in size. This likely represents abscess versus hematoma. There is also asymmetric enlargement of the right    psoas and iliac vessels consistent with myositis.      2.  Sclerosis and erosive change of the right SI joint. Consideration should be given for inflammatory or infectious sacroiliitis.      3.  Bibasilar atelectasis.      EC-ECHOCARDIOGRAM COMPLETE W/O CONT   Final Result      MR-CERVICAL SPINE-WITH    (Results Pending)   MR-THORACIC SPINE-WITH    (Results Pending)   MR-LUMBAR SPINE-WITH    (Results Pending)        Assessment/Plan  * Psoas abscess (HCC)- (present on admission)  Assessment & Plan  Noted in MRI on 4/7/2022. CT scan showing Right posterior/inferior psoas muscle fluid collection which extends into the anterior/inferior iliacus muscle and measures 6.5 x 5 x 2.5 cm in size on 4/16   --Status post IR guided drainage placement on 4/16/2022  Culture pending  Continue IV Ancef as per infectious disease recommendation    Sepsis (HCC)  Assessment & Plan  This is Sepsis Present on admission  SIRS criteria identified on my evaluation  include: Tachycardia, with heart rate greater than 90 BPM, Leukocytosis, with WBC greater than 12,000 and Bandemia, greater than 10% bands  Source is abscess  Sepsis protocol initiated  Fluid resuscitation ordered per protocol  Crystalloid Fluid Administration: Fluid resuscitation ordered per standard protocol - 30 mL/kg per current or ideal body weight  IV antibiotics as appropriate for source of sepsis  Reassessment: I have reassessed the patient's hemodynamic status    SIRS criteria being tachycardia with heart rate greater than 90, WBC greater than 12, source of infection  Sepsis.  Patient is also found to have MSSA bacteremia  , Continue IV Ancef, ID following      MSSA bacteremia  Assessment & Plan  Blood culture obtained on 4/14/2020 grew MSSA.  Repeat blood culture  On 4/16 pending, echocardiogram did not show any vegetation.  ID following and recs MR CTL spine; ordered   Likely the source of the infection is from the psoas abscess.  Continue Ancef    Normocytic anemia  Assessment & Plan  Monitor hemoglobin adequate, today is noted to be 10.8    Thrombocytosis  Assessment & Plan  Resolved, likely reactive secondary to infection    Chronic pain syndrome  Assessment & Plan  Multimodal Pain control    Leukocytosis  Assessment & Plan  Resolved down, monitor, continue IV antibiotics       VTE prophylaxis: SCDs/TEDs

## 2022-04-18 PROBLEM — M46.58 SEPTIC ARTHRITIS OF SACROILIAC JOINT (HCC): Status: ACTIVE | Noted: 2022-04-18

## 2022-04-18 LAB
ALBUMIN SERPL BCP-MCNC: 3.2 G/DL (ref 3.2–4.9)
BACTERIA WND AEROBE CULT: ABNORMAL
BACTERIA WND AEROBE CULT: ABNORMAL
BUN SERPL-MCNC: 13 MG/DL (ref 8–22)
CALCIUM SERPL-MCNC: 9 MG/DL (ref 8.5–10.5)
CHLORIDE SERPL-SCNC: 98 MMOL/L (ref 96–112)
CO2 SERPL-SCNC: 30 MMOL/L (ref 20–33)
CREAT SERPL-MCNC: 0.92 MG/DL (ref 0.5–1.4)
ERYTHROCYTE [DISTWIDTH] IN BLOOD BY AUTOMATED COUNT: 41.8 FL (ref 35.9–50)
GFR SERPLBLD CREATININE-BSD FMLA CKD-EPI: 102 ML/MIN/1.73 M 2
GLUCOSE SERPL-MCNC: 115 MG/DL (ref 65–99)
GRAM STN SPEC: ABNORMAL
HCT VFR BLD AUTO: 32 % (ref 42–52)
HGB BLD-MCNC: 10.3 G/DL (ref 14–18)
MCH RBC QN AUTO: 29.5 PG (ref 27–33)
MCHC RBC AUTO-ENTMCNC: 32.2 G/DL (ref 33.7–35.3)
MCV RBC AUTO: 91.7 FL (ref 81.4–97.8)
PHOSPHATE SERPL-MCNC: 3.4 MG/DL (ref 2.5–4.5)
PLATELET # BLD AUTO: 376 K/UL (ref 164–446)
PMV BLD AUTO: 8 FL (ref 9–12.9)
POTASSIUM SERPL-SCNC: 4.1 MMOL/L (ref 3.6–5.5)
RBC # BLD AUTO: 3.49 M/UL (ref 4.7–6.1)
SIGNIFICANT IND 70042: ABNORMAL
SITE SITE: ABNORMAL
SODIUM SERPL-SCNC: 136 MMOL/L (ref 135–145)
SOURCE SOURCE: ABNORMAL
WBC # BLD AUTO: 9.5 K/UL (ref 4.8–10.8)

## 2022-04-18 PROCEDURE — 700111 HCHG RX REV CODE 636 W/ 250 OVERRIDE (IP): Performed by: HOSPITALIST

## 2022-04-18 PROCEDURE — 700111 HCHG RX REV CODE 636 W/ 250 OVERRIDE (IP): Performed by: INTERNAL MEDICINE

## 2022-04-18 PROCEDURE — 85027 COMPLETE CBC AUTOMATED: CPT

## 2022-04-18 PROCEDURE — 700102 HCHG RX REV CODE 250 W/ 637 OVERRIDE(OP): Performed by: STUDENT IN AN ORGANIZED HEALTH CARE EDUCATION/TRAINING PROGRAM

## 2022-04-18 PROCEDURE — 80069 RENAL FUNCTION PANEL: CPT

## 2022-04-18 PROCEDURE — 36415 COLL VENOUS BLD VENIPUNCTURE: CPT

## 2022-04-18 PROCEDURE — 99232 SBSQ HOSP IP/OBS MODERATE 35: CPT | Performed by: HOSPITALIST

## 2022-04-18 PROCEDURE — 99233 SBSQ HOSP IP/OBS HIGH 50: CPT | Performed by: INTERNAL MEDICINE

## 2022-04-18 PROCEDURE — 770001 HCHG ROOM/CARE - MED/SURG/GYN PRIV*

## 2022-04-18 PROCEDURE — A9270 NON-COVERED ITEM OR SERVICE: HCPCS | Performed by: STUDENT IN AN ORGANIZED HEALTH CARE EDUCATION/TRAINING PROGRAM

## 2022-04-18 RX ORDER — METHOCARBAMOL 500 MG/1
500 TABLET, FILM COATED ORAL 3 TIMES DAILY PRN
Status: DISCONTINUED | OUTPATIENT
Start: 2022-04-18 | End: 2022-04-21 | Stop reason: HOSPADM

## 2022-04-18 RX ADMIN — CEFAZOLIN SODIUM 2 G: 2 INJECTION, SOLUTION INTRAVENOUS at 05:34

## 2022-04-18 RX ADMIN — SENNOSIDES AND DOCUSATE SODIUM 2 TABLET: 50; 8.6 TABLET ORAL at 05:35

## 2022-04-18 RX ADMIN — OXYCODONE 5 MG: 5 TABLET ORAL at 09:33

## 2022-04-18 RX ADMIN — OXYCODONE 5 MG: 5 TABLET ORAL at 16:50

## 2022-04-18 RX ADMIN — CEFAZOLIN SODIUM 2 G: 2 INJECTION, SOLUTION INTRAVENOUS at 23:44

## 2022-04-18 RX ADMIN — GABAPENTIN 300 MG: 300 CAPSULE ORAL at 05:34

## 2022-04-18 RX ADMIN — SENNOSIDES AND DOCUSATE SODIUM 2 TABLET: 50; 8.6 TABLET ORAL at 16:50

## 2022-04-18 RX ADMIN — OXYCODONE HYDROCHLORIDE 10 MG: 10 TABLET ORAL at 23:44

## 2022-04-18 RX ADMIN — OXYCODONE 5 MG: 5 TABLET ORAL at 05:35

## 2022-04-18 RX ADMIN — CEFAZOLIN SODIUM 2 G: 2 INJECTION, SOLUTION INTRAVENOUS at 15:02

## 2022-04-18 RX ADMIN — OXYCODONE 5 MG: 5 TABLET ORAL at 12:43

## 2022-04-18 RX ADMIN — GABAPENTIN 300 MG: 300 CAPSULE ORAL at 16:50

## 2022-04-18 RX ADMIN — ENOXAPARIN SODIUM 40 MG: 40 INJECTION SUBCUTANEOUS at 15:04

## 2022-04-18 RX ADMIN — KETOROLAC TROMETHAMINE 30 MG: 30 INJECTION, SOLUTION INTRAMUSCULAR at 15:04

## 2022-04-18 RX ADMIN — OXYCODONE HYDROCHLORIDE 10 MG: 10 TABLET ORAL at 00:51

## 2022-04-18 ASSESSMENT — ENCOUNTER SYMPTOMS
NAUSEA: 1
DIARRHEA: 0
BRUISES/BLEEDS EASILY: 0
FEVER: 0
HEMOPTYSIS: 0
SORE THROAT: 0
DEPRESSION: 0
COUGH: 0
MYALGIAS: 0
WEAKNESS: 0
ORTHOPNEA: 0
DIZZINESS: 0
HEADACHES: 0
FOCAL WEAKNESS: 1
FLANK PAIN: 1
SHORTNESS OF BREATH: 0
EYE PAIN: 0
SPUTUM PRODUCTION: 0
PALPITATIONS: 0
BLURRED VISION: 0
BACK PAIN: 1
NERVOUS/ANXIOUS: 0
VOMITING: 0
FOCAL WEAKNESS: 0
CHILLS: 0
ABDOMINAL PAIN: 0

## 2022-04-18 ASSESSMENT — PAIN DESCRIPTION - PAIN TYPE
TYPE: ACUTE PAIN;SURGICAL PAIN

## 2022-04-18 NOTE — DISCHARGE PLANNING
Call to [facility], spoke to Sophia Crum RN, she advised that she will be here tomorrow to determine insurance auth and any teaching needed.  Faxed H&P and MD order to [facility] at Fax: [phone]  Updated patient and his SO Alejandra at bedside, coverage now under [facility]'s [employer] PPO.

## 2022-04-18 NOTE — PROGRESS NOTES
Hospital Medicine Daily Progress Note    Date of Service  4/18/2022    Chief Complaint  Gary Ramon is a 46 y.o. male admitted 4/14/2022 with   Chief Complaint   Patient presents with   • Sent by MD Dr. Eb Alcazar from [facility] sent patient over to rule out infection r/t large hematoma of psoas muscle   • Hip Pain     Hip, lower back, and buttocks pain since march. Seen by many specialists.          Hospital Course  This is a 46 year-old male with a past medical significant for chronic sciatica on the right side went to chiropractor with hope that his pain will get relieved.  When he went to chiropractor with some manipulation he was noted to have severe pain to the extent that he is unable to move.    Patient went to spine surgery and Dr Romero recommended following up with Dr. Alcazar.  He had an MRI on 4/7/2022 showing significant fluid within the psoas and iliac is muscle bellies as well as posteriorly near the piriformis concerning for hematoma or abscess.  This patient was sent to ER by orthopedic surgery.    Also reports associated with fever is 102 with the last 4 days.  Upon presentation in ER, is noted to be tachycardic with heart rate of 112, WBC 16.9, lactic acid 2.1, CRP 14.1, patient had a suprapubic catheter, which will be changed.    Blood Cx X 2 on 4/14 grew MSSA.  Repeat blood culture in 4/166/2022 no growth to date, will obtain PICC line. Paatient underwent IR guided drainage on 4/16/2022, culture growing MSSA.  Drain is managed by IR. will need IV antibiotics till 5/20/2022.  While patient was in IV antibiotics, will need CBC, CMP, ESR, CRP every week.    ID consulted, patient antibiotics was changed to Ancef. As perrecommendation MRI of the cervical spine thoracic and lumbar spine has been ordered obtained, noted to have septic arthritis of sacroiliac joint.  I discussed with Dr. Romero we stated no intervention indicated at this time.  He recommended continuing IV antibiotics.      Interval  events:  -- No acute events overnight, patient complained of being nauseous, will provide Zofran.  Continue to complain of the pain on the right hip and buttock area. This gets worse with movement.  --Patient has been stable, heart rate 83-91.  Blood pressure improved, 99/61  --Trend lactic acid.  Unfortunately no blood work was obtained at the time of dictation  Depending upon the culture and sensitivity will consider calling ID.  Plan of care has been discussed with patient, nursing staff, case management    4/16:  -- No acute events overnight.  Blood cultures growing MSSA, antibiotics changed to Ancef..  Echocardiogram did not show any vegetation.  Patient does have a psoas abscess on CT scan of the abdomen and pelvis.  Patient is n.p.o., will be going for IR guided drainage.  -- Continue to complain of pain in his right pelvic area.  -- Currently n.p.o.    4/17:  -- No acute events overnight, vital sign has been stable, patient underwent IR guided drainage on 4/16, culture pending.  ID consulted, currently patient is on Ancef.  Repeat blood culture pending.  If repeat blood culture has been negative for 48 hours, will provide PICC line.  --Patient states that she hip his movement has been decreased on the right lower extremity secondary to pain.  MRI of the cervical, T-spine, lumbar spine has been ordered as per ID recommendation.  --Echocardiogram did not show any vegetation  --Continue IV Ancef  --Monitor drain output    4/18:  -- No acute events overnight, medicine has been stable, patient report that his pain has gotten significantly better.  MRI of the lumbar spine reviewed, noted to have septic arthritis of sacroiliac joint.  -- Patient blood culture obtained on 4/16/2020 has no growth to date.  We will obtain PICC line  -- Continue IV antibiotics Ancef, last day being 5/28/2022  -- TTE did not show any endocarditis  We will follow ID recommendation.  Drain management per IR  Multimodal pain management  including narcotic and gabapentin  -- Vitals and has been stable, currently saturating well on room air, blood pressure 104//68, patient denies any lightheadedness associated with this.  Heart rate at 77- 82    I have personally seen and examined the patient at bedside. I discussed the plan of care with patient, bedside RN, charge RN and .    Consultants/Specialty  IR  Code Status  Full Code    Disposition  Patient is NOT MEDICALLY CLEARED for discharge.   Anticipate discharge to to home with close outpatient follow-up.  I have placed the appropriate orders for post-discharge needs.    Review of Systems  Review of Systems   Constitutional: Negative for malaise/fatigue.   HENT: Negative for congestion and sore throat.    Eyes: Negative for pain.   Respiratory: Negative for cough, hemoptysis and sputum production.    Cardiovascular: Negative for chest pain, orthopnea and leg swelling.   Gastrointestinal: Positive for nausea. Negative for abdominal pain and diarrhea.   Genitourinary: Positive for dysuria. Negative for frequency.   Musculoskeletal: Positive for back pain and joint pain.   Neurological: Negative for focal weakness, weakness and headaches.   Psychiatric/Behavioral: Negative for depression.   All other systems reviewed and are negative.       Physical Exam  Temp:  [36.8 °C (98.2 °F)-36.9 °C (98.4 °F)] 36.8 °C (98.2 °F)  Pulse:  [] 77  Resp:  [16-17] 16  BP: ()/(65-84) 104/68  SpO2:  [91 %-96 %] 94 %    Physical Exam  Vitals and nursing note reviewed.   Constitutional:       Appearance: Normal appearance.   HENT:      Head: Atraumatic.   Eyes:      Extraocular Movements: Extraocular movements intact.      Pupils: Pupils are equal, round, and reactive to light.   Cardiovascular:      Rate and Rhythm: Normal rate.      Pulses: Normal pulses.   Pulmonary:      Effort: No respiratory distress.      Breath sounds: Normal breath sounds.   Abdominal:      General: Bowel sounds are normal.  There is no distension.      Palpations: Abdomen is soft.      Tenderness: There is no abdominal tenderness.   Musculoskeletal:      Cervical back: Neck supple.      Right lower leg: No edema.      Left lower leg: No edema.      Comments: Decreased ROm in Right side 2/2 pain  TTP on palpation   Skin:     General: Skin is warm.   Neurological:      Mental Status: He is alert and oriented to person, place, and time.      Cranial Nerves: No cranial nerve deficit.      Comments: Patient stated that because of pain , he has difficulty movement with his leg    Psychiatric:         Mood and Affect: Mood normal.         Fluids    Intake/Output Summary (Last 24 hours) at 4/18/2022 1349  Last data filed at 4/18/2022 0900  Gross per 24 hour   Intake --   Output 3980 ml   Net -3980 ml       Laboratory  Recent Labs     04/16/22  0539 04/18/22  0450   WBC 9.9 9.5   RBC 3.58* 3.49*   HEMOGLOBIN 10.8* 10.3*   HEMATOCRIT 33.4* 32.0*   MCV 93.3 91.7   MCH 30.2 29.5   MCHC 32.3* 32.2*   RDW 43.6 41.8   PLATELETCT 392 376   MPV 8.3* 8.0*     Recent Labs     04/16/22  0539 04/18/22  0450   SODIUM 138 136   POTASSIUM 3.6 4.1   CHLORIDE 99 98   CO2 29 30   GLUCOSE 125* 115*   BUN 13 13   CREATININE 0.87 0.92   CALCIUM 9.3 9.0                   Imaging  MR-THORACIC SPINE-WITH & W/O   Final Result      1.  No evidence of acute abnormality or abnormal enhancement in the thoracic spine.   2.  Diffuse low marrow signal intensity throughout the imaged osseous structures. Nonspecific finding which can be seen in the setting of chronic disease, diffuse infiltrating processes such as multiple myeloma, blood dyscrasias and metastases.      MR-CERVICAL SPINE-WITH & W/O   Final Result      1.  No acute abnormality or abnormal enhancement in the cervical spine.   2.  Multilevel degenerative changes of the cervical spine as described above.   3.  Diffuse low marrow signal intensity throughout the imaged osseous structures. Nonspecific finding which can  be seen in the setting of chronic disease, diffuse infiltrating processes such as multiple myeloma, blood dyscrasias and metastases.      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 There is increased T2 fluid signal intensity in the right sacroiliac joint space also suspicious for septic arthritis.      Attempts to convey these findings to Dr. DAMON LEVI were initiated on 4/17/2022 5:44 PM.      Findings were discussed with Dr. Weston on 4/17/2022 at approximately 1800 hours.      CT-DRAIN-RETROPERITONEAL   Final Result      1.  CT guided retroperitoneal right lower quadrant catheter drainage.   2.  The current plan is to obtain a follow-up CT scan in 5-7 days.      CT-ABDOMEN-PELVIS WITH   Final Result      1.  Right posterior/inferior psoas muscle fluid collection which extends into the anterior/inferior iliacus muscle and measures 6.5 x 5 x 2.5 cm in size. This likely represents abscess versus hematoma. There is also asymmetric enlargement of the right    psoas and iliac vessels consistent with myositis.      2.  Sclerosis and erosive change of the right SI joint. Consideration should be given for inflammatory or infectious sacroiliitis.      3.  Bibasilar atelectasis.      EC-ECHOCARDIOGRAM COMPLETE W/O CONT   Final Result           Assessment/Plan  * Psoas abscess (HCC)- (present on admission)  Assessment & Plan  Noted in MRI on 4/7/2022. CT scan showing Right posterior/inferior psoas muscle fluid collection which extends into the anterior/inferior iliacus muscle and measures 6.5 x 5 x 2.5 cm in size on 4/16   --Status post IR guided drainage placement on 4/16/2022  Culture growing MSSA  Continue IV Ancef as per infectious disease recommendation, last day being 5/28/2022    Septic arthritis of sacroiliac joint (HCC)  Assessment & Plan  MRI of the lumbar spine is consistent with septic arthritis of sacroiliac joint.  Patient underwent  IR guided  drainage of psoas abscess consistent with MSSA   --Discussed with spine surgery Dr. Romero who recommended no intervention indicated.  He recommended treating with IV antibiotics.  ID is already consulted, will follow their recommendation    Sepsis (HCC)  Assessment & Plan  This is Sepsis Present on admission  SIRS criteria identified on my evaluation include: Tachycardia, with heart rate greater than 90 BPM, Leukocytosis, with WBC greater than 12,000 and Bandemia, greater than 10% bands  Source is abscess  Sepsis protocol initiated  Fluid resuscitation ordered per protocol  Crystalloid Fluid Administration: Fluid resuscitation ordered per standard protocol - 30 mL/kg per current or ideal body weight  IV antibiotics as appropriate for source of sepsis  Reassessment: I have reassessed the patient's hemodynamic status    SIRS criteria being tachycardia with heart rate greater than 90, WBC greater than 12, source of infection  Sepsis.  Patient is also found to have MSSA bacteremia  , Continue IV Ancef, ID following      MSSA bacteremia  Assessment & Plan  Blood culture obtained on 4/14/2020 grew MSSA.  Repeat blood culture  On 4/16 NGTD echocardiogram did not show any vegetation.  ID following and recs MR CTL spine; ordered   Likely the source of the infection is from the psoas abscess.  Continue Ancef  PIcc line ordered for tomorrow    Normocytic anemia  Assessment & Plan  Monitor hemoglobin adequate, today is noted to be 10.3    Thrombocytosis  Assessment & Plan  Resolved, likely reactive secondary to infection    Chronic pain syndrome  Assessment & Plan  Multimodal Pain control including Tylenol, Robaxin, gabapentin, narcotic pain medication    Leukocytosis  Assessment & Plan  Resolved down, monitor, continue IV antibiotics       VTE prophylaxis: lovenox    I have performed a physical exam and reviewed and updated ROS and Plan today (4/18/2022). In review of yesterday's note (4/17/2022), there are no changes except as  documented above.

## 2022-04-18 NOTE — CARE PLAN
The patient is Stable - Low risk of patient condition declining or worsening    Shift Goals  Clinical Goals: pain control, BENJAMIN drain flush  Patient Goals: pain control, rest  Family Goals: Over all care mgt    Progress made toward(s) clinical / shift goals:    Problem: Pain - Standard  Goal: Alleviation of pain or a reduction in pain to the patient’s comfort goal  Outcome: Progressing     Problem: Knowledge Deficit - Standard  Goal: Patient and family/care givers will demonstrate understanding of plan of care, disease process/condition, diagnostic tests and medications  Outcome: Progressing     Problem: Fluid Volume  Goal: Fluid volume balance will be maintained  Outcome: Progressing     Problem: Urinary - Renal Perfusion  Goal: Ability to achieve and maintain adequate renal perfusion and functioning will improve  Outcome: Progressing       Patient is not progressing towards the following goals:

## 2022-04-18 NOTE — PROGRESS NOTES
Infectious Disease Progress Note    Author: Lisa English M.D. Date & Time of service: 2022  7:47 AM    Chief Complaint:  psoas abscess   MSSA bacteremia, sepsis resolved     Interval History:  46 y.o. male admitted 2022 with several weeks h/o pain right low back and hip found to have above  /17 AF WBC 9.9 tolerating abx-has abd drain right No N/V. BP low, no symptoms    Review of Systems:  Review of Systems   Constitutional: Negative for chills, fever and malaise/fatigue.   Respiratory: Negative for cough, sputum production and shortness of breath.    Musculoskeletal: Positive for back pain. Negative for joint pain and myalgias.   Psychiatric/Behavioral: The patient is not nervous/anxious.        Hemodynamics:  Temp (24hrs), Av.8 °C (98.3 °F), Min:36.8 °C (98.2 °F), Max:36.9 °C (98.4 °F)  Temperature: 36.8 °C (98.2 °F)  Pulse  Av  Min: 68  Max: 112   Blood Pressure: 104/68       Physical Exam:  Physical Exam  Constitutional:       Appearance: Normal appearance.   Cardiovascular:      Rate and Rhythm: Normal rate and regular rhythm.      Heart sounds: Normal heart sounds.   Pulmonary:      Effort: Pulmonary effort is normal.      Breath sounds: Normal breath sounds.   Abdominal:      General: Abdomen is flat. Bowel sounds are normal.      Palpations: Abdomen is soft.      Comments: Drain in place with serosanguineous fluid   Musculoskeletal:         General: Normal range of motion.   Skin:     General: Skin is warm and dry.   Neurological:      General: No focal deficit present.      Mental Status: He is alert and oriented to person, place, and time.   Psychiatric:         Mood and Affect: Mood normal.         Behavior: Behavior normal.         Meds:    Current Facility-Administered Medications:   •  ketorolac  •  ceFAZolin  •  acetaminophen  •  ondansetron  •  senna-docusate **AND** polyethylene glycol/lytes **AND** magnesium hydroxide **AND** bisacodyl  •  LR  •  hydrALAZINE  •   gabapentin  •  Pharmacy Consult Request  •  oxyCODONE immediate-release **OR** oxyCODONE immediate-release **OR** morphine injection    Labs:  Recent Labs     04/15/22  0935 04/16/22  0539 04/18/22  0450   WBC 13.4* 9.9 9.5   RBC 3.35* 3.58* 3.49*   HEMOGLOBIN 10.1* 10.8* 10.3*   HEMATOCRIT 31.2* 33.4* 32.0*   MCV 93.1 93.3 91.7   MCH 30.1 30.2 29.5   RDW 43.7 43.6 41.8   PLATELETCT 357 392 376   MPV 8.3* 8.3* 8.0*   NEUTSPOLYS 78.60*  --   --    LYMPHOCYTES 14.70*  --   --    MONOCYTES 4.10  --   --    EOSINOPHILS 1.90  --   --    BASOPHILS 0.30  --   --      Recent Labs     04/15/22  0935 04/16/22  0539 04/18/22  0450   SODIUM 134* 138 136   POTASSIUM 4.0 3.6 4.1   CHLORIDE 99 99 98   CO2 25 29 30   GLUCOSE 106* 125* 115*   BUN 16 13 13     Recent Labs     04/15/22  0935 04/16/22  0539 04/18/22  0450   ALBUMIN 3.0* 3.0* 3.2   TBILIRUBIN 0.4  --   --    ALKPHOSPHAT 68  --   --    TOTPROTEIN 6.4  --   --    ALTSGPT 28  --   --    ASTSGOT 14  --   --    CREATININE 0.88 0.87 0.92       Imaging:  CT-ABDOMEN-PELVIS WITH    Result Date: 4/15/2022  4/15/2022 6:48 PM HISTORY/REASON FOR EXAM:  Abdominal distention and pain.. TECHNIQUE/EXAM DESCRIPTION:   CT scan of the abdomen and pelvis with contrast. Contrast-enhanced helical scanning was obtained from the diaphragmatic domes through the pubic symphysis following the bolus administration of nonionic contrast without complication. 100 mL of Omnipaque 350 nonionic contrast was administered without complication. Low dose optimization technique was utilized for this CT exam including automated exposure control and adjustment of the mA and/or kV according to patient size. COMPARISON: No prior studies available. FINDINGS: Lower Chest: There is bibasilar atelectasis.. Liver: Fatty change. Spleen: Enlarged. Pancreas: Unremarkable. Gallbladder: No calcified stones. Biliary: Nondilated. Adrenal glands: Normal. Kidneys: Unremarkable without hydronephrosis. Bowel: No obstruction or  acute inflammation. Lymph nodes: No adenopathy. Vasculature: Unremarkable. Musculoskeletal: There is stranding attenuation with a fluid collection located within the posterior right psoas muscle measuring 6.5 x 5 x 2.5 cm in size. This extends posterior and inferior from the psoas muscle also into the anterior aspect of the right iliacus muscle. There is some surrounding stranding in attenuation. There is asymmetric enlargement of the right psoas and iliac muscles. Pelvis: No adenopathy or free fluid. There is a Martin catheter within the urinary bladder. There is some ill-definition of the right SI joint with erosive change and sclerosis suggesting either inflammatory or infectious right sacroiliitis.     1.  Right posterior/inferior psoas muscle fluid collection which extends into the anterior/inferior iliacus muscle and measures 6.5 x 5 x 2.5 cm in size. This likely represents abscess versus hematoma. There is also asymmetric enlargement of the right psoas and iliac vessels consistent with myositis. 2.  Sclerosis and erosive change of the right SI joint. Consideration should be given for inflammatory or infectious sacroiliitis. 3.  Bibasilar atelectasis.    CT-DRAIN-RETROPERITONEAL    Result Date: 4/16/2022 4/16/2022 12:12 PM HISTORY/REASON FOR EXAM:  drainage of psoas abscess/hematoma. TECHNIQUE/EXAM DESCRIPTION: RLQ abscess drainage with CT guidance. Low dose optimization technique was utilized for this CT exam including automated exposure control and adjustment of the mA and/or kV according to patient size. PROCEDURE: Informed consent was obtained. Moderate sedation with Fentanyl and Versed was administered during the procedure with appropriate continuous patient monitoring by the radiology nurse. Intraservice duration: 30 minutes Localizing CT images were obtained with the patient in supine position. The skin was prepped with ChloraPrep and draped in a sterile fashion. Following local anesthesia with 1%  Lidocaine, a 17 guiding needle was placed and needle path confirmed with CT. A guidewire was placed and following serial tract dilatation, a 10 Irish pigtail locking catheter was placed. A specimen was collected and submitted for culture and sensitivity and Gram stain. Total of 16 cc of bloody purulent fluid was drained. The catheter was secured to the skin and connected to suction bulb drainage. Final CT images were obtained documenting catheter position. The patient tolerated the procedure well with no evidence of complication. COMPARISON: None available. FINDINGS: The final CT images show satisfactory catheter position within the target collection.     1.  CT guided retroperitoneal right lower quadrant catheter drainage. 2.  The current plan is to obtain a follow-up CT scan in 5-7 days.    DX-LUMBAR SPINE-4+ VIEWS    Result Date: 4/5/2022  Radiographs of the lumbar spine demonstrate well-preserved disc spaces no evidence of fracture no spondylolisthesis.    MR-CERVICAL SPINE-WITH & W/O    Result Date: 4/17/2022 4/17/2022 3:55 PM HISTORY/REASON FOR EXAM:  Cervical radiculopathy, infection suspected. TECHNIQUE/EXAM DESCRIPTION: MRI of the cervical spine without and with contrast. The study was performed on a [facility] 1.5 Sangeeta MRI scanner. T1 sagittal, T2 fast spin-echo sagittal, T1 postcontrast fat-suppressed sagittal, and gradient-echo axial images were obtained of the cervical spine. 14 mL ProHance contrast were administered  intravenously. COMPARISON:  None. FINDINGS: There is mild intervertebral disc spacer at C4-5, C5-6 and C6-7. Vertebral body height and alignment is normal. There is diffuse low marrow signal intensity throughout the imaged osseous structures. Nonspecific finding which can be seen in the setting of chronic disease, diffuse infiltrating processes such as multiple myeloma, blood dyscrasias and metastases. The cervical cord has a normal caliber, course and signal intensity. There is no  evidence of abnormal enhancement in the cervical cord or spine. Level specific findings as follows: C2-C3: No significant central canal or neural foraminal stenosis. C3-C4: There is minimal broad posterior disc bulge and endplate spurring. There is mild right neural foraminal stenosis secondary to disc bulge, endplate spurring and facet arthropathy. C4-C5: There is minimal broad posterior disc bulge and endplate spurring. There is mild bilateral neural foraminal stenosis secondary to disc bulge, endplate spurring and facet arthropathy. C5-C6: There is mild broad posterior disc bulge and endplate spurring. There is mild ligamentum flavum hypertrophy. There is mild central canal stenosis. There is mild right and moderate left neural foraminal stenosis secondary to disc bulge, endplate spurring and facet arthropathy. C6-C7: There is mild broad posterior disc bulge and endplate spurring. There is moderately mentum flavum hypertrophy. There is mild central canal stenosis. There is mild right and moderate left neural foraminal stenosis secondary to disc bulge, endplate spurring and facet arthropathy. C7-T1: No significant central canal or neural foraminal stenosis.     1.  No acute abnormality or abnormal enhancement in the cervical spine. 2.  Multilevel degenerative changes of the cervical spine as described above. 3.  Diffuse low marrow signal intensity throughout the imaged osseous structures. Nonspecific finding which can be seen in the setting of chronic disease, diffuse infiltrating processes such as multiple myeloma, blood dyscrasias and metastases.    MR-LUMBAR SPINE-WITH & W/O    Result Date: 4/17/2022 4/17/2022 3:55 PM HISTORY/REASON FOR EXAM:  Myelopathy, acute or progressive; Infection , bacteremia TECHNIQUE/EXAM DESCRIPTION: MRI of the lumbar spine without and with contrast. The study was performed on a [employer] 1.5 Sangeeta MRI scanner. T1 sagittal, T2 fast spin-echo sagittal, and T2 axial images were  obtained of the lumbar spine. T1 postcontrast fat-suppressed sagittal images were obtained. 14 mL ProHance contrast was administered intravenously. COMPARISON:  None. FINDINGS: Alignment in the lumbar spine is normal. Marrow signal in the vertebral bodies is diffusely hypointense. There is T2 signal hyperintensity and associated enhancement involving the right sacral ala. Adjacent inflammatory change involving the adjacent iliopsoas and iliac is muscle are present. There is increased T2 fluid signal intensity in the right sacroiliac joint space. There are no significant osteophytic changes. The conus is normal in position and signal. There is no abnormal cord enhancement. At T12-L1 through L5-S1 disc height and signal are normal. At T12-L1 through L5-S1 there is no significant disc bulge or protrusion. There is no congenital or acquired central spinal stenosis at any lumbar level. The neural foramina are intact at all lumbar levels. There is no significant ligamentous or facet hypertrophy.     Findings are suspicious for right sacral ala osteomyelitis. There is increased T2 fluid signal intensity in the right sacroiliac joint space also suspicious for septic arthritis. Attempts to convey these findings to Dr. DAMON LEVI were initiated on 4/17/2022 5:44 PM. Findings were discussed with Dr. Weston on 4/17/2022 at approximately 1800 hours.    MR-THORACIC SPINE-WITH & W/O    Result Date: 4/17/2022 4/17/2022 3:55 PM HISTORY/REASON FOR EXAM:  Myelopathy, acute or progressive; Looking for infection. TECHNIQUE/EXAM DESCRIPTION: MRI of the thoracic spine without and with contrast. The study was performed on a [employer] 1.5 Sangeeta MRI scanner. T1 sagittal, T1 postcontrast fat-suppressed sagittal, T2 sagittal, and T2 axial images were obtained of the thoracic spine. 14 mL ProHance contrast were administered intravenously. COMPARISON:  None FINDINGS: There is diffusely low marrow signal intensity throughout the spine which is  nonspecific. The thoracic vertebral bodies have a normal height and alignment. The thoracic disks have a normal appearance and signal intensity. The thoracic cord has a normal caliber, course and signal intensity. There is no evidence of abnormal enhancement in the thoracic cord or spine. There is no evidence of disk extrusion, cord compression, central canal stenosis, or neural foraminal stenosis. There is trace right pleural effusion.     1.  No evidence of acute abnormality or abnormal enhancement in the thoracic spine. 2.  Diffuse low marrow signal intensity throughout the imaged osseous structures. Nonspecific finding which can be seen in the setting of chronic disease, diffuse infiltrating processes such as multiple myeloma, blood dyscrasias and metastases.    EC-ECHOCARDIOGRAM COMPLETE W/O CONT    Result Date: 4/15/2022  Transthoracic Echo Report Echocardiography Laboratory CONCLUSIONS Normal transthoracic echocardiogram. GUSTAVORAUDEL GUSTAVO Exam Date:         04/15/2022                    13:27 Exam Location:     Inpatient Priority:          Routine Ordering Physician:        ELEN ROSALES Referring Physician:       [id number] Benitez Sonographer:               Rosalinda Staton ANIBAL Age:    46     Gender:    M MRN:    [id number] :    1975 BSA:    1.94   Ht (in):    71     Wt (lb):    164 Exam Type:     Complete Indications:     Fever ICD Codes:       780.6 CPT Codes:       21448 BP:   109    /   72     HR:   80 Technical Quality:       Fair MEASUREMENTS  (Male / Female) Normal Values 2D ECHO LV Diastolic Diameter PLAX        4.4 cm                4.2 - 5.9 / 3.9 - 5.3 cm LV Systolic Diameter PLAX         2.7 cm                2.1 - 4.0 cm IVS Diastolic Thickness           0.85 cm               LVPW Diastolic Thickness          0.89 cm               LVOT Diameter                     2.1 cm                Estimated LV Ejection Fraction    48 %                  LV Ejection Fraction MOD BP       61.5 %                 >= 55  % LV Ejection Fraction MOD 4C       66.4 %                LV Ejection Fraction MOD 2C       55.9 %                IVC Diameter                      1.6 cm                DOPPLER AV Peak Velocity                  1.4 m/s               AV Peak Gradient                  7.9 mmHg              AV Mean Gradient                  4.5 mmHg              LVOT Peak Velocity                1.1 m/s               AV Area Cont Eq vti               2.6 cm2               Mitral E Point Velocity           0.75 m/s              Mitral E to A Ratio               1.2                   MV Pressure Half Time             48.9 ms               MV Area PHT                       4.5 cm2               MV Deceleration Time              169 ms                TV Peak E Velocity                0.5 m/s               * Indicates values subject to auto-interpretation LV EF:  48    % FINDINGS Left Ventricle Normal left ventricular chamber size. Normal left ventricular wall thickness. Normal left ventricular systolic function. The left ventricular ejection fraction is visually estimated to be 55%. Normal regional wall motion. Normal diastolic function. Right Ventricle The right ventricle is normal in size and systolic function. Right Atrium The right atrium is normal in size. Normal inferior vena cava size and inspiratory collapse. Left Atrium The left atrium is normal in size. Left atrial volume index is 16 mL/sq m. Mitral Valve Structurally normal mitral valve without significant stenosis or regurgitation. Aortic Valve Structurally normal aortic valve without significant stenosis or regurgitation. Tricuspid Valve Structurally normal tricuspid valve without significant stenosis or regurgitation. Unable to estimate right ventricular systolic pressure due to an inadequate tricuspid regurgitant jet. Pulmonic Valve No pulmonic stenosis. Trace pulmonic insufficiency. Pericardium Normal pericardium without effusion. Aorta Normal aortic root  "for body surface area. The ascending aorta diameter is 3.3 cm. Héctor Hancock MD (Electronically Signed) Final Date:     15 April 2022                 15:16    DX-HIP-UNILATERAL-W PELVIS-3 VIEWS RIGHT    Result Date: 4/5/2022  Radiographs of the pelvis and the right hip were independently reviewed did not show any evidence of fracture no lesions are noted.  He does have bilateral cam lesions with mild to moderate bilateral arthritic changes..      Micro:  Results     Procedure Component Value Units Date/Time    CULTURE WOUND W/ GRAM STAIN [[id number]]  (Abnormal) Collected: 04/16/22 1241    Order Status: Completed Specimen: Wound Updated: 04/17/22 1909     Significant Indicator POS     Source WND     Site Right Psoas Abscess     Culture Result -     Gram Stain Result Many WBCs.  Many Gram positive cocci.       Culture Result Staphylococcus aureus  Heavy growth      Narrative:      Collected By: [id number] JESSY CARMONA  Psoas abscess right  Aerobic and Anaerobic please  Collected By: [id number] JESSY CARMONA    Blood Culture [[id number]]  (Abnormal)  (Susceptibility) Collected: 04/14/22 1830    Order Status: Completed Specimen: Blood from Peripheral Updated: 04/17/22 1022     Significant Indicator POS     Source BLD     Site PERIPHERAL     Culture Result Growth detected by Bactec instrument. 04/15/2022  21:24  Staphylococcus aureus (methicillin sensitive)  detected by PCR.        Staphylococcus aureus  This isolate is presumed to be clindamycin resistant based on  detection of inducible resistance.      Narrative:      CALL  [facility]  131 tel. [phone],  CALLED  131 tel. [phone] 04/15/2022, 21:26, RB PERF. RESULTS CALLED TO:  RN: [id number]  1 of 2 for Blood Culture x 2 sites order. Per Hospital  Policy: Only change Specimen Src: to \"Line\" if specified by  physician order.  Left AC    Susceptibility     Staphylococcus aureus (1)     Antibiotic Interpretation Microscan   Method Status    Azithromycin Resistant >4 mcg/mL CLEMENTINA Final " "   Clindamycin Resistant <=0.25 mcg/mL CLEMENTINA Final    Cefazolin Sensitive <=8 mcg/mL CLEMENTINA Final    Cefepime Sensitive <=4 mcg/mL CLEMENTINA Final    Ceftaroline Sensitive <=0.5 mcg/mL CLEMENTINA Final    Daptomycin Sensitive 1 mcg/mL CLEMENTINA Final    Erythromycin Resistant >4 mcg/mL CLEMENTINA Final    Ampicillin/sulbactam Sensitive <=8/4 mcg/mL CLEMENTINA Final    Vancomycin Sensitive 1 mcg/mL CLEMENTINA Final    Oxacillin Sensitive <=0.25 mcg/mL CLEMENTINA Final    Pip/Tazobactam Sensitive <=8 mcg/mL CLEMENTINA Final    Trimeth/Sulfa Sensitive <=0.5/9.5 mcg/mL CLEMENTINA Final    Tetracycline Sensitive <=4 mcg/mL CLEMENTINA Final                   BLOOD CULTURE [[id number]] Collected: 04/16/22 1143    Order Status: Completed Specimen: Blood from Peripheral Updated: 04/17/22 0855     Significant Indicator NEG     Source BLD     Site PERIPHERAL     Culture Result No Growth  Note: Blood cultures are incubated for 5 days and  are monitored continuously.Positive blood cultures  are called to the RN and reported as soon as  they are identified.      Narrative:      Per Hospital Policy: Only change Specimen Src: to \"Line\" if  specified by physician order.  Right AC    BLOOD CULTURE [[id number]] Collected: 04/16/22 1143    Order Status: Completed Specimen: Blood from Peripheral Updated: 04/17/22 0855     Significant Indicator NEG     Source BLD     Site PERIPHERAL     Culture Result No Growth  Note: Blood cultures are incubated for 5 days and  are monitored continuously.Positive blood cultures  are called to the RN and reported as soon as  they are identified.      Narrative:      Per Hospital Policy: Only change Specimen Src: to \"Line\" if  specified by physician order.  Right Hand    URINE CULTURE(NEW) [[id number]] Collected: 04/14/22 1957    Order Status: Completed Specimen: Urine Updated: 04/16/22 2130     Significant Indicator NEG     Source UR     Site -     Culture Result Usual skin nadiya <10,000 cfu/mL    Narrative:      Indication for culture:->Patient WITHOUT an indwelling " "Martin  catheter in place with new onset of Dysuria, Frequency,  Urgency, and/or Suprapubic pain    GRAM STAIN [[id number]] Collected: 04/16/22 1241    Order Status: Completed Specimen: Wound Updated: 04/16/22 1855     Significant Indicator .     Source WND     Site Right Psoas Abscess     Gram Stain Result Many WBCs.  Many Gram positive cocci.      Narrative:      Collected By: [id number] JESSY CARMONA  Psoas abscess right  Aerobic and Anaerobic please  Collected By: [id number] JESSY CARMONA    FLUID CULTURE W/GRAM STAIN [[id number]] Collected: 04/16/22 1241    Order Status: Canceled Specimen: Other Body Fluid     MRSA By PCR (Amp) [[id number]] Collected: 04/15/22 0355    Order Status: Completed Specimen: Respirate from Nares Updated: 04/15/22 1842     MRSA by PCR Negative    Narrative:      Collected By: [id number] JAIMIE CARMONA  Per [facility] Protocol    Blood Culture [[id number]] Collected: 04/14/22 1846    Order Status: Completed Specimen: Blood from Peripheral Updated: 04/15/22 0739     Significant Indicator NEG     Source BLD     Site PERIPHERAL     Culture Result No Growth  Note: Blood cultures are incubated for 5 days and  are monitored continuously.Positive blood cultures  are called to the RN and reported as soon as  they are identified.      Narrative:      2 of 2 blood culture x2  Sites order. Per Hospital Policy:  Only change Specimen Src: to \"Line\" if specified by physician  order.  Right AC    URINALYSIS CULTURE, IF INDICATED [[id number]]  (Abnormal) Collected: 04/14/22 1957    Order Status: Completed Specimen: Urine Updated: 04/14/22 2046     Color Yellow     Character Clear     Specific Gravity 1.019     Ph 6.0     Glucose Negative mg/dL      Ketones Negative mg/dL      Protein Negative mg/dL      Bilirubin Negative     Urobilinogen, Urine 0.2     Nitrite Negative     Leukocyte Esterase Small     Occult Blood Moderate     Micro Urine Req Microscopic    Narrative:      Indication for culture:->Patient WITHOUT an " indwelling Martin  catheter in place with new onset of Dysuria, Frequency,  Urgency, and/or Suprapubic pain    Urinalysis [[id number]] Collected: 04/14/22 0000    Order Status: Canceled Specimen: Urine           Assessment:  Active Hospital Problems    Diagnosis    • *Psoas abscess (HCC) [K68.12]    • MSSA bacteremia [R78.81, B95.61]    • Sepsis (HCC) [A41.9]    • Leukocytosis [D72.829]    • Chronic pain syndrome [G89.4]    • Thrombocytosis [D75.839]    • Normocytic anemia [D64.9]      Interval 24 hours:      AF, O2 RA  Labs reviewed  Imaging personally reviewed both images and report.   Studies reviewed  Micro reviewed  Psoas drain output 30 cc in last 24 hours    Patient doing well overall with some mild tenderness around drain site but no significant back pain or any other new joint pain.  Tolerating cefazolin well.  Continued on antibiotics as below.    Assessment:  46 y.o. male admitted 4/14/2022 with several weeks h/o pain right low back and hip.  Unclear etiology but did have suprapubic catheter placed due to ureteral obstruction approximately 2 months ago.  Denies any other for material or hardware in his body.    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   MSSA bacteremia  - BCx on 4/14 +MSSA  - BCx on 4/16 - NGTD  Ureteral stricture, plan is for eventual reconstruction, suprapubic catheter in place  -Urine culture negative, no sign of infection     Plan:    --- Continue cefazolin 2 g every 8 hours, will plan on a 6-week IV antibiotic course for bacteremia and osteomyelitis from first negative blood culture, (end 5/28/22)   --- Patient prefers home infusion if able to obtain insurance approval, orders written and given to case management  --- Okay to place PICC line tomorrow  --- Drain management per IR  --- TTE unrevealing; recommend NAEEM if persistently positive blood cultures or new fevers    Discussed with internal medicine, Dr. Soni. ID will  follow.

## 2022-04-18 NOTE — PROGRESS NOTES
"Radiology Progress Note   Author: CRISTOPHER Sahni Date & Time created: 4/18/2022  2:29 PM   Date of admission  4/14/2022  Note to reader: this note follows the APSO format rather than the historical SOAP format. Assessment and plan located at the top of the note for ease of use.    Chief Complaint  46 y.o. male admitted 4/14/2022 with   Chief Complaint   Patient presents with   • Sent by MD Dr. Eb Alcazar from [facility] sent patient over to rule out infection r/t large hematoma of psoas muscle   • Hip Pain     Hip, lower back, and buttocks pain since march. Seen by many specialists.        HPI  \" 46 y.o. male admitted 4/14/2022 with several weeks h/o pain right low back and hip.  Unclear etiology but did have suprapubic catheter placed due to ureteral obstruction approximately 2 months ago.  Denies any other for material or hardware in his body\"      Assessment/Plan  Interval History   Principal Problem:    Psoas abscess (HCC)  Active Problems:    Sepsis (HCC)    Leukocytosis    Chronic pain syndrome    Thrombocytosis    Normocytic anemia    MSSA bacteremia    Septic arthritis of sacroiliac joint (HCC)      Plan IR  - Irrigate right posterior drain with 10 ml of sterile saline twice per shift   -Psoas  Fluid cultures + Staphylococcus aureus   - Blood cultures + MSSA  - ID following, managing antibiotics   - Recommend follow up CT scan in 5-7 days 4/21   - Continue to monitor drains, VS and labs        IR:   4/16-  Right iliacus abscess   4/18 - 10 ml serosanguinous fluid, MRI T spine diffuse low marrow signal intensity throughout the imaged osseous structures. Nonspecific finding which can be seen in the setting of chronic disease, diffuse infiltrating processes such as multiple myeloma, blood dyscrasias and metastases.         Review of Systems  Physical Exam   Review of Systems   Constitutional: Positive for malaise/fatigue. Negative for chills and fever.   HENT: Negative for hearing loss.    Eyes: " Negative for blurred vision.   Respiratory: Negative for cough, hemoptysis and shortness of breath.    Cardiovascular: Negative for chest pain and palpitations.   Gastrointestinal: Negative for abdominal pain and vomiting.   Genitourinary: Positive for dysuria and flank pain.   Musculoskeletal: Negative for myalgias.   Skin: Negative for rash.   Neurological: Positive for focal weakness. Negative for dizziness, weakness and headaches.   Endo/Heme/Allergies: Does not bruise/bleed easily.   Psychiatric/Behavioral: Negative for suicidal ideas.      Vitals:    04/18/22 0727   BP: 104/68   Pulse: 77   Resp: 16   Temp: 36.8 °C (98.2 °F)   SpO2: 94%        Physical Exam  Constitutional:       Appearance: Normal appearance.   HENT:      Head: Normocephalic.      Nose: Nose normal.      Mouth/Throat:      Mouth: Mucous membranes are moist.   Eyes:      Pupils: Pupils are equal, round, and reactive to light.   Cardiovascular:      Rate and Rhythm: Normal rate.   Pulmonary:      Effort: Pulmonary effort is normal. No respiratory distress.   Abdominal:      General: Abdomen is flat.      Tenderness: There is no abdominal tenderness.      Comments: Suprapubic catheter    Musculoskeletal:         General: Tenderness present. No deformity.      Cervical back: Normal range of motion.        Back:    Skin:     General: Skin is warm and dry.      Capillary Refill: Capillary refill takes less than 2 seconds.      Coloration: Skin is not jaundiced or pale.   Neurological:      Mental Status: He is alert and oriented to person, place, and time.      Motor: Weakness present.   Psychiatric:         Mood and Affect: Mood normal.         Behavior: Behavior normal.             Labs    Recent Labs     04/16/22  0539 04/18/22  0450   WBC 9.9 9.5   RBC 3.58* 3.49*   HEMOGLOBIN 10.8* 10.3*   HEMATOCRIT 33.4* 32.0*   MCV 93.3 91.7   MCH 30.2 29.5   MCHC 32.3* 32.2*   RDW 43.6 41.8   PLATELETCT 392 376   MPV 8.3* 8.0*     Recent Labs      04/16/22  0539 04/18/22  0450   SODIUM 138 136   POTASSIUM 3.6 4.1   CHLORIDE 99 98   CO2 29 30   GLUCOSE 125* 115*   BUN 13 13   CREATININE 0.87 0.92   CALCIUM 9.3 9.0     Recent Labs     04/16/22  0539 04/18/22  0450   ALBUMIN 3.0* 3.2   CREATININE 0.87 0.92     MR-THORACIC SPINE-WITH & W/O   Final Result      1.  No evidence of acute abnormality or abnormal enhancement in the thoracic spine.   2.  Diffuse low marrow signal intensity throughout the imaged osseous structures. Nonspecific finding which can be seen in the setting of chronic disease, diffuse infiltrating processes such as multiple myeloma, blood dyscrasias and metastases.      MR-CERVICAL SPINE-WITH & W/O   Final Result      1.  No acute abnormality or abnormal enhancement in the cervical spine.   2.  Multilevel degenerative changes of the cervical spine as described above.   3.  Diffuse low marrow signal intensity throughout the imaged osseous structures. Nonspecific finding which can be seen in the setting of chronic disease, diffuse infiltrating processes such as multiple myeloma, blood dyscrasias and metastases.      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 There is increased T2 fluid signal intensity in the right sacroiliac joint space also suspicious for septic arthritis.      Attempts to convey these findings to Dr. DAMON LEVI were initiated on 4/17/2022 5:44 PM.      Findings were discussed with Dr. Weston on 4/17/2022 at approximately 1800 hours.      CT-DRAIN-RETROPERITONEAL   Final Result      1.  CT guided retroperitoneal right lower quadrant catheter drainage.   2.  The current plan is to obtain a follow-up CT scan in 5-7 days.      CT-ABDOMEN-PELVIS WITH   Final Result      1.  Right posterior/inferior psoas muscle fluid collection which extends into the anterior/inferior iliacus muscle and measures 6.5 x 5 x 2.5 cm in size. This likely represents abscess versus hematoma.  There is also asymmetric enlargement of the right    psoas and iliac vessels consistent with myositis.      2.  Sclerosis and erosive change of the right SI joint. Consideration should be given for inflammatory or infectious sacroiliitis.      3.  Bibasilar atelectasis.      EC-ECHOCARDIOGRAM COMPLETE W/O CONT   Final Result          INR   Date Value Ref Range Status   04/14/2022 1.25 (H) 0.87 - 1.13 Final     Comment:     INR - Non-therapeutic Reference Range: 0.87-1.13  INR - Therapeutic Reference Range: 2.0-4.0       No results found for: POCINR     Intake/Output Summary (Last 24 hours) at 4/18/2022 1429  Last data filed at 4/18/2022 0900  Gross per 24 hour   Intake --   Output 3980 ml   Net -3980 ml      Labs not explicitly included in this progress note were reviewed by the author. Radiology/imaging not explicitly included in this progress note was reviewed by the author.     I have performed a physical exam and reviewed and updated ROS and Plan today (4/18/2022).     15 minutes in directly providing and coordinating care and extensive data review.  No time overlap and excludes procedures.

## 2022-04-18 NOTE — ASSESSMENT & PLAN NOTE
MRI of the lumbar spine is consistent with septic arthritis of sacroiliac joint.  Patient underwent  IR guided drainage of psoas abscess consistent with MSSA   --Discussed with spine surgery Dr. Romero who recommended no intervention indicated.  He recommended treating with IV antibiotics.  ID is already consulted, will follow their recommendation

## 2022-04-18 NOTE — PROGRESS NOTES
Called by radiology about MRI lumbar spine  On the sacral ala near where the abscess used to be, there appears to be osteomyelitis and possibly septic arthritis  Patient is currently already on antibiotics and ID is following  Will continue antibiotics and await ID reeval tomorrow

## 2022-04-19 ENCOUNTER — APPOINTMENT (OUTPATIENT)
Dept: RADIOLOGY | Facility: MEDICAL CENTER | Age: 47
DRG: 871 | End: 2022-04-19
Attending: HOSPITALIST
Payer: OTHER MISCELLANEOUS

## 2022-04-19 PROBLEM — R21 RASH AND NONSPECIFIC SKIN ERUPTION: Status: ACTIVE | Noted: 2022-04-19

## 2022-04-19 LAB
ALBUMIN SERPL BCP-MCNC: 3.6 G/DL (ref 3.2–4.9)
BACTERIA BLD CULT: NORMAL
BUN SERPL-MCNC: 13 MG/DL (ref 8–22)
CALCIUM SERPL-MCNC: 8.8 MG/DL (ref 8.5–10.5)
CHLORIDE SERPL-SCNC: 101 MMOL/L (ref 96–112)
CO2 SERPL-SCNC: 28 MMOL/L (ref 20–33)
CREAT SERPL-MCNC: 0.97 MG/DL (ref 0.5–1.4)
ERYTHROCYTE [DISTWIDTH] IN BLOOD BY AUTOMATED COUNT: 42 FL (ref 35.9–50)
GFR SERPLBLD CREATININE-BSD FMLA CKD-EPI: 96 ML/MIN/1.73 M 2
GLUCOSE SERPL-MCNC: 100 MG/DL (ref 65–99)
HCT VFR BLD AUTO: 33.9 % (ref 42–52)
HGB BLD-MCNC: 11 G/DL (ref 14–18)
MCH RBC QN AUTO: 29.7 PG (ref 27–33)
MCHC RBC AUTO-ENTMCNC: 32.4 G/DL (ref 33.7–35.3)
MCV RBC AUTO: 91.6 FL (ref 81.4–97.8)
PHOSPHATE SERPL-MCNC: 3.5 MG/DL (ref 2.5–4.5)
PLATELET # BLD AUTO: 427 K/UL (ref 164–446)
PMV BLD AUTO: 8.2 FL (ref 9–12.9)
POTASSIUM SERPL-SCNC: 4.7 MMOL/L (ref 3.6–5.5)
RBC # BLD AUTO: 3.7 M/UL (ref 4.7–6.1)
SIGNIFICANT IND 70042: NORMAL
SITE SITE: NORMAL
SODIUM SERPL-SCNC: 140 MMOL/L (ref 135–145)
SOURCE SOURCE: NORMAL
WBC # BLD AUTO: 8.8 K/UL (ref 4.8–10.8)

## 2022-04-19 PROCEDURE — 700111 HCHG RX REV CODE 636 W/ 250 OVERRIDE (IP): Performed by: HOSPITALIST

## 2022-04-19 PROCEDURE — 97165 OT EVAL LOW COMPLEX 30 MIN: CPT

## 2022-04-19 PROCEDURE — 85027 COMPLETE CBC AUTOMATED: CPT

## 2022-04-19 PROCEDURE — 02HV33Z INSERTION OF INFUSION DEVICE INTO SUPERIOR VENA CAVA, PERCUTANEOUS APPROACH: ICD-10-PCS | Performed by: HOSPITALIST

## 2022-04-19 PROCEDURE — 700105 HCHG RX REV CODE 258: Performed by: INTERNAL MEDICINE

## 2022-04-19 PROCEDURE — 80069 RENAL FUNCTION PANEL: CPT

## 2022-04-19 PROCEDURE — 700102 HCHG RX REV CODE 250 W/ 637 OVERRIDE(OP): Performed by: INTERNAL MEDICINE

## 2022-04-19 PROCEDURE — 770001 HCHG ROOM/CARE - MED/SURG/GYN PRIV*

## 2022-04-19 PROCEDURE — A9270 NON-COVERED ITEM OR SERVICE: HCPCS | Performed by: INTERNAL MEDICINE

## 2022-04-19 PROCEDURE — 700111 HCHG RX REV CODE 636 W/ 250 OVERRIDE (IP): Performed by: INTERNAL MEDICINE

## 2022-04-19 PROCEDURE — A9270 NON-COVERED ITEM OR SERVICE: HCPCS | Performed by: STUDENT IN AN ORGANIZED HEALTH CARE EDUCATION/TRAINING PROGRAM

## 2022-04-19 PROCEDURE — 97162 PT EVAL MOD COMPLEX 30 MIN: CPT

## 2022-04-19 PROCEDURE — 99233 SBSQ HOSP IP/OBS HIGH 50: CPT | Performed by: INTERNAL MEDICINE

## 2022-04-19 PROCEDURE — 700102 HCHG RX REV CODE 250 W/ 637 OVERRIDE(OP): Performed by: STUDENT IN AN ORGANIZED HEALTH CARE EDUCATION/TRAINING PROGRAM

## 2022-04-19 PROCEDURE — 36573 INSJ PICC RS&I 5 YR+: CPT

## 2022-04-19 RX ORDER — DIPHENHYDRAMINE HYDROCHLORIDE, ZINC ACETATE 2; .1 G/100G; G/100G
CREAM TOPICAL 3 TIMES DAILY PRN
Status: DISCONTINUED | OUTPATIENT
Start: 2022-04-19 | End: 2022-04-21 | Stop reason: HOSPADM

## 2022-04-19 RX ADMIN — GABAPENTIN 300 MG: 300 CAPSULE ORAL at 06:18

## 2022-04-19 RX ADMIN — CEFAZOLIN SODIUM 2 G: 2 INJECTION, SOLUTION INTRAVENOUS at 06:19

## 2022-04-19 RX ADMIN — GABAPENTIN 300 MG: 300 CAPSULE ORAL at 17:52

## 2022-04-19 RX ADMIN — DAPTOMYCIN 600 MG: 500 INJECTION, POWDER, LYOPHILIZED, FOR SOLUTION INTRAVENOUS at 17:52

## 2022-04-19 RX ADMIN — KETOROLAC TROMETHAMINE 30 MG: 30 INJECTION, SOLUTION INTRAMUSCULAR at 06:18

## 2022-04-19 RX ADMIN — SENNOSIDES AND DOCUSATE SODIUM 2 TABLET: 50; 8.6 TABLET ORAL at 17:51

## 2022-04-19 RX ADMIN — OXYCODONE 5 MG: 5 TABLET ORAL at 08:30

## 2022-04-19 RX ADMIN — OXYCODONE 5 MG: 5 TABLET ORAL at 22:27

## 2022-04-19 RX ADMIN — RIVAROXABAN 10 MG: 10 TABLET, FILM COATED ORAL at 17:52

## 2022-04-19 RX ADMIN — OXYCODONE 5 MG: 5 TABLET ORAL at 17:51

## 2022-04-19 RX ADMIN — SENNOSIDES AND DOCUSATE SODIUM 2 TABLET: 50; 8.6 TABLET ORAL at 06:19

## 2022-04-19 ASSESSMENT — ENCOUNTER SYMPTOMS
ABDOMINAL PAIN: 0
SPUTUM PRODUCTION: 0
BLURRED VISION: 0
FOCAL WEAKNESS: 1
HEMOPTYSIS: 0
COUGH: 0
SORE THROAT: 0
FEVER: 0
DIZZINESS: 0
ORTHOPNEA: 0
CHILLS: 0
BACK PAIN: 1
SHORTNESS OF BREATH: 0
EYE PAIN: 0
NERVOUS/ANXIOUS: 0
NAUSEA: 0
BRUISES/BLEEDS EASILY: 0
BACK PAIN: 0
MYALGIAS: 0
DEPRESSION: 0
DIARRHEA: 0
PALPITATIONS: 0
FOCAL WEAKNESS: 0
VOMITING: 0
WEAKNESS: 0
FLANK PAIN: 1
HEADACHES: 0

## 2022-04-19 ASSESSMENT — COGNITIVE AND FUNCTIONAL STATUS - GENERAL
MOBILITY SCORE: 21
MOVING FROM LYING ON BACK TO SITTING ON SIDE OF FLAT BED: A LITTLE
DAILY ACTIVITIY SCORE: 24
MOVING TO AND FROM BED TO CHAIR: A LITTLE
SUGGESTED CMS G CODE MODIFIER DAILY ACTIVITY: CH
SUGGESTED CMS G CODE MODIFIER MOBILITY: CJ
TURNING FROM BACK TO SIDE WHILE IN FLAT BAD: A LITTLE

## 2022-04-19 ASSESSMENT — GAIT ASSESSMENTS
DISTANCE (FEET): 300
GAIT LEVEL OF ASSIST: SUPERVISED
DEVIATION: STEP TO;ANTALGIC
ASSISTIVE DEVICE: FRONT WHEEL WALKER

## 2022-04-19 ASSESSMENT — PAIN DESCRIPTION - PAIN TYPE
TYPE: ACUTE PAIN;SURGICAL PAIN

## 2022-04-19 ASSESSMENT — ACTIVITIES OF DAILY LIVING (ADL): TOILETING: INDEPENDENT

## 2022-04-19 NOTE — DISCHARGE PLANNING
Met with patient at bedside, discussed possible plan for discharge home on 4/21. Per Sophia Good at [facility]; patient not yet showing under GF [employer] coverage and may need to pay $308.66 per week out of pocket. Patient reported that he is under Alejandra's coverage and will have her bring in the paperwork. He is okay with paying $308.66 per week if no home IV coverage under plan. Sophia will come in for IV teaching with patient when ID determines home medication.

## 2022-04-19 NOTE — PROCEDURES
Vascular Access Team     Date of Insertion: 04/19/2022  Arm Circumference: 32.5  Internal length: 37  External Length: 0  Vein Occupancy %: 27   Reason for PICC: Antibiotic Therapy  Labs: WBC 9.5, , BUN 13, Cr 0.92, , INR 1.25 on 4/14/22     Consents confirmed, vessel patency confirmed with ultrasound. Risks and benefits of procedure explained to patient and education regarding central line associated bloodstream infections provided. Questions answered.      PICC placed in RUE per licensed provider order with ultrasound guidance.  4 Fr, single lumen PICC placed in basilic vein after 1 attempt(s). 2 mL of 1% lidocaine injected intradermally at the insertion site. A 21 gauge microintroducer needle was visualized entering the vein and modified Seldinger technique was used to obtain access to the vein. 37 cm catheter inserted and brisk blood return was observed from each lumen upon aspiration. Line secured at the 0 cm marker. TCS stylet removed and observed to be fully intact. Each lumen flushed using pulsatile method without resistance with 10 mL 0.9% normal saline. PICC line secured with Biopatch and Tegaderm.     PICC tip placement location is confirmed by nurse to be in the Superior Vena Cava (SVC) utilizing 3CG technology. PICC line is appropriate for use at this time. Patient tolerated procedure well, without complications.  Patient condition relayed to primary RN or ordering physician via this post procedure note in the EMR.      Ultrasound images uploaded to PACS and viewable in the EMR - yes  Ultrasound imaged printed and placed in paper chart - no     "[employer] Power PICC ref # [id number], Lot # [id number], Expiration Date 02/28/2023

## 2022-04-19 NOTE — ASSESSMENT & PLAN NOTE
4/19-Possible drug reaction.  See above  Benadryl cream as needed.  No other systemic manifestation.  We will follow-up with infectious disease  4/20- resolving

## 2022-04-19 NOTE — PROGRESS NOTES
Hospital Medicine Daily Progress Note    Date of Service  4/19/2022    Chief Complaint  Gary Ramon is a 46 y.o. male admitted 4/14/2022 with   Chief Complaint   Patient presents with   • Sent by MD Dr. Eb Alcazar from [facility] sent patient over to rule out infection r/t large hematoma of psoas muscle   • Hip Pain     Hip, lower back, and buttocks pain since march. Seen by many specialists.          Hospital Course  This is a 46 year-old male with a past medical significant for chronic sciatica on the right side went to chiropractor with hope that his pain will get relieved.  When he went to chiropractor with some manipulation he was noted to have severe pain to the extent that he is unable to move.    Patient went to spine surgery and Dr Romero recommended following up with Dr. Alcazar.  He had an MRI on 4/7/2022 showing significant fluid within the psoas and iliac is muscle bellies as well as posteriorly near the piriformis concerning for hematoma or abscess.  This patient was sent to ER by orthopedic surgery.    Also reports associated with fever is 102 with the last 4 days.  Upon presentation in ER, is noted to be tachycardic with heart rate of 112, WBC 16.9, lactic acid 2.1, CRP 14.1, patient had a suprapubic catheter, which will be changed.    Blood Cx X 2 on 4/14 grew MSSA.  Repeat blood culture in 4/166/2022 no growth to date, will obtain PICC line. Paatient underwent IR guided drainage on 4/16/2022, culture growing MSSA.  Drain is managed by IR. will need IV antibiotics till 5/20/2022.  While patient was in IV antibiotics, will need CBC, CMP, ESR, CRP every week.    ID consulted, patient antibiotics was changed to Ancef. As perrecommendation MRI of the cervical spine thoracic and lumbar spine has been ordered obtained, noted to have septic arthritis of sacroiliac joint.  I discussed with Dr. Romero we stated no intervention indicated at this time.  He recommended continuing IV antibiotics.      Interval  events:  -- No acute events overnight, patient complained of being nauseous, will provide Zofran.  Continue to complain of the pain on the right hip and buttock area. This gets worse with movement.  --Patient has been stable, heart rate 83-91.  Blood pressure improved, 99/61  --Trend lactic acid.  Unfortunately no blood work was obtained at the time of dictation  Depending upon the culture and sensitivity will consider calling ID.  Plan of care has been discussed with patient, nursing staff, case management    4/16:  -- No acute events overnight.  Blood cultures growing MSSA, antibiotics changed to Ancef..  Echocardiogram did not show any vegetation.  Patient does have a psoas abscess on CT scan of the abdomen and pelvis.  Patient is n.p.o., will be going for IR guided drainage.  -- Continue to complain of pain in his right pelvic area.  -- Currently n.p.o.    4/17:  -- No acute events overnight, vital sign has been stable, patient underwent IR guided drainage on 4/16, culture pending.  ID consulted, currently patient is on Ancef.  Repeat blood culture pending.  If repeat blood culture has been negative for 48 hours, will provide PICC line.  --Patient states that she hip his movement has been decreased on the right lower extremity secondary to pain.  MRI of the cervical, T-spine, lumbar spine has been ordered as per ID recommendation.  --Echocardiogram did not show any vegetation  --Continue IV Ancef  --Monitor drain output    4/18:  -- No acute events overnight, medicine has been stable, patient report that his pain has gotten significantly better.  MRI of the lumbar spine reviewed, noted to have septic arthritis of sacroiliac joint.  -- Patient blood culture obtained on 4/16/2020 has no growth to date.  We will obtain PICC line  -- Continue IV antibiotics Ancef, last day being 5/28/2022  -- TTE did not show any endocarditis  We will follow ID recommendation.  Drain management per IR  Multimodal pain management  including narcotic and gabapentin  -- Vitals and has been stable, currently saturating well on room air, blood pressure 104//68, patient denies any lightheadedness associated with this.  Heart rate at 77- 82    4/19-patient is developing a rash on his back, upper arm.  Rashes mobility form, not blanching, positive itchiness.  Seems like drug reaction.  Patient is on cefazolin.  I did notify infectious disease, Dr. English about the rash.  Benadryl cream ordered.  No other systemic signs, as fever, shortness of breath, swelling.  Vitals remained stable  PICC line was placed today  Cultures positive for staph, clindamycin resistant.  Infectious disease following.  Appreciate their recommendations in regards to antibiotic.  IR following for drainage management.  Patient will need a repeat CT scan 4/21.    I have personally seen and examined the patient at bedside. I discussed the plan of care with patient, bedside RN, charge RN and .    Consultants/Specialty  IR  Code Status  Full Code    Disposition  Patient is NOT MEDICALLY CLEARED for discharge.   Anticipate discharge to to home with close outpatient follow-up.  I have placed the appropriate orders for post-discharge needs.    Review of Systems  Review of Systems   Constitutional: Negative for malaise/fatigue.   HENT: Negative for congestion and sore throat.    Eyes: Negative for pain.   Respiratory: Negative for cough, hemoptysis and sputum production.    Cardiovascular: Negative for chest pain, orthopnea and leg swelling.   Gastrointestinal: Negative for abdominal pain, diarrhea and nausea.   Genitourinary: Negative for dysuria and frequency.        Patient has a suprapubic catheter   Musculoskeletal: Positive for back pain and joint pain.   Neurological: Negative for focal weakness, weakness and headaches.   Psychiatric/Behavioral: Negative for depression.   All other systems reviewed and are negative.       Physical Exam  Temp:  [36.8 °C (98.2  °F)-37.1 °C (98.8 °F)] 37 °C (98.6 °F)  Pulse:  [72-84] 81  Resp:  [18] 18  BP: (105-125)/(66-85) 125/85  SpO2:  [93 %-99 %] 96 %    Physical Exam  Vitals and nursing note reviewed.   Constitutional:       Appearance: Normal appearance.   HENT:      Head: Atraumatic.   Eyes:      Extraocular Movements: Extraocular movements intact.      Pupils: Pupils are equal, round, and reactive to light.   Cardiovascular:      Rate and Rhythm: Normal rate.      Pulses: Normal pulses.   Pulmonary:      Effort: No respiratory distress.      Breath sounds: Normal breath sounds.   Abdominal:      General: Bowel sounds are normal. There is no distension.      Palpations: Abdomen is soft.      Tenderness: There is no abdominal tenderness.   Musculoskeletal:      Cervical back: Neck supple.      Right lower leg: No edema.      Left lower leg: No edema.      Comments: Decreased ROm in Right side 2/2 pain  TTP on palpation   Skin:     General: Skin is warm.   Neurological:      Mental Status: He is alert and oriented to person, place, and time.      Cranial Nerves: No cranial nerve deficit.      Comments: Patient stated that because of pain , he has difficulty movement with his leg    Psychiatric:         Mood and Affect: Mood normal.         Fluids    Intake/Output Summary (Last 24 hours) at 4/19/2022 1036  Last data filed at 4/18/2022 2000  Gross per 24 hour   Intake 360 ml   Output 10 ml   Net 350 ml       Laboratory  Recent Labs     04/18/22  0450   WBC 9.5   RBC 3.49*   HEMOGLOBIN 10.3*   HEMATOCRIT 32.0*   MCV 91.7   MCH 29.5   MCHC 32.2*   RDW 41.8   PLATELETCT 376   MPV 8.0*     Recent Labs     04/18/22  0450   SODIUM 136   POTASSIUM 4.1   CHLORIDE 98   CO2 30   GLUCOSE 115*   BUN 13   CREATININE 0.92   CALCIUM 9.0                   Imaging  MR-THORACIC SPINE-WITH & W/O   Final Result      1.  No evidence of acute abnormality or abnormal enhancement in the thoracic spine.   2.  Diffuse low marrow signal intensity throughout the  imaged osseous structures. Nonspecific finding which can be seen in the setting of chronic disease, diffuse infiltrating processes such as multiple myeloma, blood dyscrasias and metastases.      MR-CERVICAL SPINE-WITH & W/O   Final Result      1.  No acute abnormality or abnormal enhancement in the cervical spine.   2.  Multilevel degenerative changes of the cervical spine as described above.   3.  Diffuse low marrow signal intensity throughout the imaged osseous structures. Nonspecific finding which can be seen in the setting of chronic disease, diffuse infiltrating processes such as multiple myeloma, blood dyscrasias and metastases.      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 There is increased T2 fluid signal intensity in the right sacroiliac joint space also suspicious for septic arthritis.      Attempts to convey these findings to Dr. DAMON LEVI were initiated on 4/17/2022 5:44 PM.      Findings were discussed with Dr. Weston on 4/17/2022 at approximately 1800 hours.      CT-DRAIN-RETROPERITONEAL   Final Result      1.  CT guided retroperitoneal right lower quadrant catheter drainage.   2.  The current plan is to obtain a follow-up CT scan in 5-7 days.      CT-ABDOMEN-PELVIS WITH   Final Result      1.  Right posterior/inferior psoas muscle fluid collection which extends into the anterior/inferior iliacus muscle and measures 6.5 x 5 x 2.5 cm in size. This likely represents abscess versus hematoma. There is also asymmetric enlargement of the right    psoas and iliac vessels consistent with myositis.      2.  Sclerosis and erosive change of the right SI joint. Consideration should be given for inflammatory or infectious sacroiliitis.      3.  Bibasilar atelectasis.      EC-ECHOCARDIOGRAM COMPLETE W/O CONT   Final Result      IR-PICC LINE PLACEMENT W/ GUIDANCE > AGE 5    (Results Pending)        Assessment/Plan  * Psoas abscess (HCC)- (present on  admission)  Assessment & Plan  Noted in MRI on 4/7/2022. CT scan showing Right posterior/inferior psoas muscle fluid collection which extends into the anterior/inferior iliacus muscle and measures 6.5 x 5 x 2.5 cm in size on 4/16   --Status post IR guided drainage placement on 4/16/2022  Culture growing MSSA  Continue IV Ancef as per infectious disease recommendation, last day being 5/28/2022 4/19-patient developed a rash, concerning for drug reaction from cefazolin.  Monitor electrolytes, kidney function, complete blood count.  Appreciate infectious disease recommendations.  Drain in place.  Patient will need repeat CT scan 4/21    Rash and nonspecific skin eruption- (present on admission)  Assessment & Plan  4/19-Possible drug reaction.  See above  Benadryl cream as needed.  No other systemic manifestation.  We will follow-up with infectious disease    Septic arthritis of sacroiliac joint (HCC)- (present on admission)  Assessment & Plan  MRI of the lumbar spine is consistent with septic arthritis of sacroiliac joint.  Patient underwent  IR guided drainage of psoas abscess consistent with MSSA   --Discussed with spine surgery Dr. Romero who recommended no intervention indicated.  He recommended treating with IV antibiotics.  ID is already consulted, will follow their recommendation    MSSA bacteremia- (present on admission)  Assessment & Plan  Blood culture obtained on 4/14/2020 grew MSSA.  Repeat blood culture  On 4/16 NGTD echocardiogram did not show any vegetation.  ID following and recs MR CTL spine; ordered   Likely the source of the infection is from the psoas abscess.  Continue Ancef  PIcc line ordered for tomorrow    Normocytic anemia- (present on admission)  Assessment & Plan  Monitor hemoglobin adequate, today is noted to be 10.3    Thrombocytosis- (present on admission)  Assessment & Plan  Resolved, likely reactive secondary to infection    Chronic pain syndrome- (present on admission)  Assessment &  Plan  Multimodal Pain control including Tylenol, Robaxin, gabapentin, narcotic pain medication    Leukocytosis- (present on admission)  Assessment & Plan  Resolved down, monitor, continue IV antibiotics    Sepsis (HCC)  Assessment & Plan  This is Sepsis Present on admission  SIRS criteria identified on my evaluation include: Tachycardia, with heart rate greater than 90 BPM, Leukocytosis, with WBC greater than 12,000 and Bandemia, greater than 10% bands  Source is abscess  Sepsis protocol initiated  Fluid resuscitation ordered per protocol  Crystalloid Fluid Administration: Fluid resuscitation ordered per standard protocol - 30 mL/kg per current or ideal body weight  IV antibiotics as appropriate for source of sepsis  Reassessment: I have reassessed the patient's hemodynamic status    SIRS criteria being tachycardia with heart rate greater than 90, WBC greater than 12, source of infection  Sepsis.  Patient is also found to have MSSA bacteremia  , Continue IV Ancef, ID following  Resolved         VTE prophylaxis: xarelto    I have performed a physical exam and reviewed and updated ROS and Plan today (4/19/2022). In review of yesterday's note (4/18/2022), there are no changes except as documented above.

## 2022-04-19 NOTE — THERAPY
"Occupational Therapy   Initial Evaluation     Patient Name: Gary Ramon  Age:  46 y.o., Sex:  male  Medical Record #: [id number]  Today's Date: 4/19/2022    Assessment    Patient is 46 y.o. male s/p drain placement for psoas abscess. Pt reports dramatic improvement in functional activity tolerance. Pt demonstrates ability to tolerate dressing, grooming and toileting ADLs as well as household mobility w/ FWW and functional transfers at SPV level. Pt reports good support for IADLs as needed from girlfriend, no additional OT needs at this time.     Plan    Recommend Occupational Therapy for Evaluation only    DC Equipment Recommendations: None  Discharge Recommendations: Anticipate that the patient will have no further occupational therapy needs after discharge from the hospital     Subjective    \"It still hurts but its way better than before.\"     Objective     04/19/22 1155   Prior Living Situation   Prior Services Intermittent Physical Support for ADL Per Family   Housing / Facility 1 Story House   Steps Into Home 0   Bathroom Set up Walk In Shower   Equipment Owned Crutches;Wheelchair   Lives with - Patient's Self Care Capacity Alone and Able to Care For Self   Comments 13 and 6yo kids part time, girlfriend provides assist as needed   Prior Level of ADL Function   Self Feeding Independent   Grooming / Hygiene Independent   Bathing Independent   Dressing Independent   Toileting Independent   Prior Level of IADL Function   Comments pt reports last month has been very limited d/t pain   Pain 0 - 10 Group   Therapist Pain Assessment During Activity;Nurse Notified  (tolerable for activity)   Cognition    Level of Consciousness Alert   Comments receptive   Strength Upper Body   Upper Body Strength  WDL   Upper Body Muscle Tone   Upper Body Muscle Tone  WDL   Coordination Upper Body   Coordination WDL   Balance Assessment   Sitting Balance (Static) Good   Sitting Balance (Dynamic) Good   Standing Balance (Static) Fair + "   Standing Balance (Dynamic) Fair   Weight Shift Sitting Good   Weight Shift Standing Fair   Comments w/FWW, limited by pain   Bed Mobility    Supine to Sit Supervised   Sit to Supine Supervised   Scooting Supervised   ADL Assessment   Eating Supervision   Grooming Supervision;Standing   Upper Body Dressing Supervision   Lower Body Dressing Supervision   Toileting Supervision   How much help from another person does the patient currently need...   Putting on and taking off regular lower body clothing? 4   Bathing (including washing, rinsing, and drying)? 4   Toileting, which includes using a toilet, bedpan, or urinal? 4   Putting on and taking off regular upper body clothing? 4   Taking care of personal grooming such as brushing teeth? 4   Eating meals? 4   6 Clicks Daily Activity Score 24   Functional Mobility   Sit to Stand Supervised   Bed, Chair, Wheelchair Transfer Supervised   Toilet Transfers Supervised   Mobility w/FWW   Activity Tolerance   Sitting in Chair functional   Sitting Edge of Bed functional   Standing functional   Education Group   Education Provided Role of Occupational Therapist;Activities of Daily Living   Role of Occupational Therapist Patient Response Patient;Acceptance;Explanation;Verbal Demonstration   ADL Patient Response Patient;Acceptance;Explanation;Action Demonstration;Verbal Demonstration   Problem List   Problem List None   Anticipated Discharge Equipment and Recommendations   DC Equipment Recommendations None   Discharge Recommendations Anticipate that the patient will have no further occupational therapy needs after discharge from the hospital

## 2022-04-19 NOTE — THERAPY
"Physical Therapy   Initial Evaluation     Patient Name: Gary Ramon  Age:  46 y.o., Sex:  male  Medical Record #: [id number]  Today's Date: 4/19/2022        Assessment  Patient is 46 y.o. male admitted with several weeks of pain in R low back and hip. Pt found to have psoas abscess and now s/p IR guided drainage on 4/16. R LE strength and ROM still present but pt reporting significant improvement in symptoms since admission. Deficits initially affecting gait and transfers but pt able to compensate for deficits while ambulating with FWW and when provided with verbal cues. Pt demonstrated adequate mobility to dc home with FWW. If symptoms persist, recommend OP PT follow-up.     Plan    Recommend Physical Therapy for Evaluation only     DC Equipment Recommendations: Front-Wheel Walker  Discharge Recommendations: Recommend outpatient physical therapy services to address higher level deficits          04/19/22 1220   Prior Living Situation   Prior Services Home-Independent   Housing / Facility 1 Story House   Steps Into Home 0   Steps In Home 0   Bathroom Set up Walk In Shower   Equipment Owned Crutches;Wheelchair   Lives with - Patient's Self Care Capacity Alone and Able to Care For Self   Comments GF who can assist some if needed   Prior Level of Functional Mobility   Bed Mobility Independent   Transfer Status Independent   Ambulation Independent   Distance Ambulation (Feet)   (community)   Assistive Devices Used None   Stairs Independent   Comments independent and working prior to injury   History of Falls   History of Falls No   Cognition    Level of Consciousness Alert   Comments receptive   Active ROM Lower Body    Active ROM Lower Body  X   Comments R LE ROM limited by pain and \"tightness\"   Strength Lower Body   Lower Body Strength  X   Comments R LE strength limited in hip flexors and extensors likely due to pain   Sensation Lower Body   Lower Extremity Sensation   WDL   Balance Assessment   Sitting Balance " (Static) Good   Sitting Balance (Dynamic) Good   Standing Balance (Static) Fair +   Standing Balance (Dynamic) Fair   Weight Shift Sitting Good   Weight Shift Standing Fair   Comments FWW   Gait Analysis   Gait Level Of Assist Supervised   Assistive Device Front Wheel Walker   Distance (Feet) 300   # of Times Distance was Traveled 1   Deviation Step To;Antalgic   # of Stairs Climbed 0   Weight Bearing Status no restrictions   Comments no LOB, pt able to progress well when walking with FWW and provided cues   Bed Mobility    Supine to Sit Supervised   Sit to Supine Supervised   Scooting Supervised   Functional Mobility   Sit to Stand Supervised   Bed, Chair, Wheelchair Transfer Supervised   Toilet Transfers Supervised   Mobility hallway with FWW   Education Group   Education Provided Role of Physical Therapist;Use of Assistive Device;Gait Training   Role of Physical Therapist Patient Response Patient;Acceptance;Explanation;Verbal Demonstration   Gait Training Patient Response Patient;Acceptance;Explanation;Verbal Demonstration;Action Demonstration   Use of Assistive Device Patient Response Patient;Acceptance;Explanation;Demonstration;Verbal Demonstration;Action Demonstration   Problem List    Problems Pain;Functional ROM Deficit;Functional Strength Deficit;Impaired Ambulation   Anticipated Discharge Equipment and Recommendations   DC Equipment Recommendations Front-Wheel Walker   Discharge Recommendations Recommend outpatient physical therapy services to address higher level deficits

## 2022-04-19 NOTE — CARE PLAN
Problem: Pain - Standard  Goal: Alleviation of pain or a reduction in pain to the patient’s comfort goal  Outcome: Progressing     Problem: Knowledge Deficit - Standard  Goal: Patient and family/care givers will demonstrate understanding of plan of care, disease process/condition, diagnostic tests and medications  Outcome: Progressing   The patient is Stable - Low risk of patient condition declining or worsening    Shift Goals  Clinical Goals: Pain control, flush nicola drain  Patient Goals: pain control, rest  Family Goals: Over all care mgt    Progress made toward(s) clinical / shift goals:  Educate patient of available PRN pain medication per MAR. Reinforce fall/safety precautions.     Patient is not progressing towards the following goals:

## 2022-04-19 NOTE — PROGRESS NOTES
Infectious Disease Progress Note    Author: Lisa English M.D. Date & Time of service: 2022  9:52 AM    Chief Complaint:  psoas abscess   MSSA bacteremia, sepsis resolved     Interval History:  46 y.o. male admitted 2022 with several weeks h/o pain right low back and hip found to have above   AF WBC 9.9 tolerating abx-has abd drain right No N/V. BP low, no symptoms   AF, O2 RA, Psoas drain output 30 cc in last 24 hours. doing well overall with some mild tenderness around drain site but no significant back pain or any other new joint pain.      Review of Systems:  Review of Systems   Gastrointestinal: Negative for abdominal pain, diarrhea, nausea and vomiting.   Musculoskeletal: Negative for back pain and myalgias.   Skin: Positive for itching and rash.   Psychiatric/Behavioral: The patient is not nervous/anxious.        Hemodynamics:  Temp (24hrs), Av °C (98.6 °F), Min:36.8 °C (98.2 °F), Max:37.1 °C (98.8 °F)  Temperature: 37 °C (98.6 °F)  Pulse  Av.1  Min: 68  Max: 112   Blood Pressure: 125/85       Physical Exam:  Physical Exam  Cardiovascular:      Rate and Rhythm: Normal rate and regular rhythm.      Heart sounds: Normal heart sounds.   Pulmonary:      Effort: Pulmonary effort is normal.      Breath sounds: Normal breath sounds.   Abdominal:      General: Abdomen is flat. Bowel sounds are normal.      Palpations: Abdomen is soft.   Musculoskeletal:         General: Normal range of motion.   Skin:     Findings: Rash present.   Neurological:      General: No focal deficit present.      Mental Status: He is oriented to person, place, and time.   Psychiatric:         Mood and Affect: Mood normal.         Behavior: Behavior normal.         Meds:    Current Facility-Administered Medications:   •  diphenhydrAMINE-zinc acetate  •  methocarbamol  •  enoxaparin (LOVENOX) injection  •  ketorolac  •  ceFAZolin  •  acetaminophen  •  ondansetron  •  senna-docusate **AND** polyethylene  glycol/lytes **AND** magnesium hydroxide **AND** bisacodyl  •  LR  •  hydrALAZINE  •  gabapentin  •  Pharmacy Consult Request  •  oxyCODONE immediate-release **OR** oxyCODONE immediate-release **OR** morphine injection    Labs:  Recent Labs     04/18/22 0450   WBC 9.5   RBC 3.49*   HEMOGLOBIN 10.3*   HEMATOCRIT 32.0*   MCV 91.7   MCH 29.5   RDW 41.8   PLATELETCT 376   MPV 8.0*     Recent Labs     04/18/22 0450   SODIUM 136   POTASSIUM 4.1   CHLORIDE 98   CO2 30   GLUCOSE 115*   BUN 13     Recent Labs     04/18/22 0450   ALBUMIN 3.2   CREATININE 0.92       Imaging:  CT-ABDOMEN-PELVIS WITH    Result Date: 4/15/2022  4/15/2022 6:48 PM HISTORY/REASON FOR EXAM:  Abdominal distention and pain.. TECHNIQUE/EXAM DESCRIPTION:   CT scan of the abdomen and pelvis with contrast. Contrast-enhanced helical scanning was obtained from the diaphragmatic domes through the pubic symphysis following the bolus administration of nonionic contrast without complication. 100 mL of Omnipaque 350 nonionic contrast was administered without complication. Low dose optimization technique was utilized for this CT exam including automated exposure control and adjustment of the mA and/or kV according to patient size. COMPARISON: No prior studies available. FINDINGS: Lower Chest: There is bibasilar atelectasis.. Liver: Fatty change. Spleen: Enlarged. Pancreas: Unremarkable. Gallbladder: No calcified stones. Biliary: Nondilated. Adrenal glands: Normal. Kidneys: Unremarkable without hydronephrosis. Bowel: No obstruction or acute inflammation. Lymph nodes: No adenopathy. Vasculature: Unremarkable. Musculoskeletal: There is stranding attenuation with a fluid collection located within the posterior right psoas muscle measuring 6.5 x 5 x 2.5 cm in size. This extends posterior and inferior from the psoas muscle also into the anterior aspect of the right iliacus muscle. There is some surrounding stranding in attenuation. There is asymmetric enlargement of  the right psoas and iliac muscles. Pelvis: No adenopathy or free fluid. There is a Martin catheter within the urinary bladder. There is some ill-definition of the right SI joint with erosive change and sclerosis suggesting either inflammatory or infectious right sacroiliitis.     1.  Right posterior/inferior psoas muscle fluid collection which extends into the anterior/inferior iliacus muscle and measures 6.5 x 5 x 2.5 cm in size. This likely represents abscess versus hematoma. There is also asymmetric enlargement of the right psoas and iliac vessels consistent with myositis. 2.  Sclerosis and erosive change of the right SI joint. Consideration should be given for inflammatory or infectious sacroiliitis. 3.  Bibasilar atelectasis.    CT-DRAIN-RETROPERITONEAL    Result Date: 4/16/2022 4/16/2022 12:12 PM HISTORY/REASON FOR EXAM:  drainage of psoas abscess/hematoma. TECHNIQUE/EXAM DESCRIPTION: RLQ abscess drainage with CT guidance. Low dose optimization technique was utilized for this CT exam including automated exposure control and adjustment of the mA and/or kV according to patient size. PROCEDURE: Informed consent was obtained. Moderate sedation with Fentanyl and Versed was administered during the procedure with appropriate continuous patient monitoring by the radiology nurse. Intraservice duration: 30 minutes Localizing CT images were obtained with the patient in supine position. The skin was prepped with ChloraPrep and draped in a sterile fashion. Following local anesthesia with 1% Lidocaine, a 17 guiding needle was placed and needle path confirmed with CT. A guidewire was placed and following serial tract dilatation, a 10 Chadian pigtail locking catheter was placed. A specimen was collected and submitted for culture and sensitivity and Gram stain. Total of 16 cc of bloody purulent fluid was drained. The catheter was secured to the skin and connected to suction bulb drainage. Final CT images were obtained  documenting catheter position. The patient tolerated the procedure well with no evidence of complication. COMPARISON: None available. FINDINGS: The final CT images show satisfactory catheter position within the target collection.     1.  CT guided retroperitoneal right lower quadrant catheter drainage. 2.  The current plan is to obtain a follow-up CT scan in 5-7 days.    DX-LUMBAR SPINE-4+ VIEWS    Result Date: 4/5/2022  Radiographs of the lumbar spine demonstrate well-preserved disc spaces no evidence of fracture no spondylolisthesis.    MR-CERVICAL SPINE-WITH & W/O    Result Date: 4/17/2022 4/17/2022 3:55 PM HISTORY/REASON FOR EXAM:  Cervical radiculopathy, infection suspected. TECHNIQUE/EXAM DESCRIPTION: MRI of the cervical spine without and with contrast. The study was performed on a [employer] Signa 1.5 Sangeeta MRI scanner. T1 sagittal, T2 fast spin-echo sagittal, T1 postcontrast fat-suppressed sagittal, and gradient-echo axial images were obtained of the cervical spine. 14 mL ProHance contrast were administered  intravenously. COMPARISON:  None. FINDINGS: There is mild intervertebral disc spacer at C4-5, C5-6 and C6-7. Vertebral body height and alignment is normal. There is diffuse low marrow signal intensity throughout the imaged osseous structures. Nonspecific finding which can be seen in the setting of chronic disease, diffuse infiltrating processes such as multiple myeloma, blood dyscrasias and metastases. The cervical cord has a normal caliber, course and signal intensity. There is no evidence of abnormal enhancement in the cervical cord or spine. Level specific findings as follows: C2-C3: No significant central canal or neural foraminal stenosis. C3-C4: There is minimal broad posterior disc bulge and endplate spurring. There is mild right neural foraminal stenosis secondary to disc bulge, endplate spurring and facet arthropathy. C4-C5: There is minimal broad posterior disc bulge and endplate spurring. There is  mild bilateral neural foraminal stenosis secondary to disc bulge, endplate spurring and facet arthropathy. C5-C6: There is mild broad posterior disc bulge and endplate spurring. There is mild ligamentum flavum hypertrophy. There is mild central canal stenosis. There is mild right and moderate left neural foraminal stenosis secondary to disc bulge, endplate spurring and facet arthropathy. C6-C7: There is mild broad posterior disc bulge and endplate spurring. There is moderately mentum flavum hypertrophy. There is mild central canal stenosis. There is mild right and moderate left neural foraminal stenosis secondary to disc bulge, endplate spurring and facet arthropathy. C7-T1: No significant central canal or neural foraminal stenosis.     1.  No acute abnormality or abnormal enhancement in the cervical spine. 2.  Multilevel degenerative changes of the cervical spine as described above. 3.  Diffuse low marrow signal intensity throughout the imaged osseous structures. Nonspecific finding which can be seen in the setting of chronic disease, diffuse infiltrating processes such as multiple myeloma, blood dyscrasias and metastases.    MR-LUMBAR SPINE-WITH & W/O    Result Date: 4/17/2022 4/17/2022 3:55 PM HISTORY/REASON FOR EXAM:  Myelopathy, acute or progressive; Infection , bacteremia TECHNIQUE/EXAM DESCRIPTION: MRI of the lumbar spine without and with contrast. The study was performed on a [employer] Signa 1.5 Sangeeta MRI scanner. T1 sagittal, T2 fast spin-echo sagittal, and T2 axial images were obtained of the lumbar spine. T1 postcontrast fat-suppressed sagittal images were obtained. 14 mL ProHance contrast was administered intravenously. COMPARISON:  None. FINDINGS: Alignment in the lumbar spine is normal. Marrow signal in the vertebral bodies is diffusely hypointense. There is T2 signal hyperintensity and associated enhancement involving the right sacral ala. Adjacent inflammatory change involving the adjacent iliopsoas and  iliac is muscle are present. There is increased T2 fluid signal intensity in the right sacroiliac joint space. There are no significant osteophytic changes. The conus is normal in position and signal. There is no abnormal cord enhancement. At T12-L1 through L5-S1 disc height and signal are normal. At T12-L1 through L5-S1 there is no significant disc bulge or protrusion. There is no congenital or acquired central spinal stenosis at any lumbar level. The neural foramina are intact at all lumbar levels. There is no significant ligamentous or facet hypertrophy.     Findings are suspicious for right sacral ala osteomyelitis. There is increased T2 fluid signal intensity in the right sacroiliac joint space also suspicious for septic arthritis. Attempts to convey these findings to Dr. DAMON LEVI were initiated on 4/17/2022 5:44 PM. Findings were discussed with Dr. Weston on 4/17/2022 at approximately 1800 hours.    MR-THORACIC SPINE-WITH & W/O    Result Date: 4/17/2022 4/17/2022 3:55 PM HISTORY/REASON FOR EXAM:  Myelopathy, acute or progressive; Looking for infection. TECHNIQUE/EXAM DESCRIPTION: MRI of the thoracic spine without and with contrast. The study was performed on a [employer] Signa 1.5 Sangeeta MRI scanner. T1 sagittal, T1 postcontrast fat-suppressed sagittal, T2 sagittal, and T2 axial images were obtained of the thoracic spine. 14 mL ProHance contrast were administered intravenously. COMPARISON:  None FINDINGS: There is diffusely low marrow signal intensity throughout the spine which is nonspecific. The thoracic vertebral bodies have a normal height and alignment. The thoracic disks have a normal appearance and signal intensity. The thoracic cord has a normal caliber, course and signal intensity. There is no evidence of abnormal enhancement in the thoracic cord or spine. There is no evidence of disk extrusion, cord compression, central canal stenosis, or neural foraminal stenosis. There is trace right pleural effusion.      1.  No evidence of acute abnormality or abnormal enhancement in the thoracic spine. 2.  Diffuse low marrow signal intensity throughout the imaged osseous structures. Nonspecific finding which can be seen in the setting of chronic disease, diffuse infiltrating processes such as multiple myeloma, blood dyscrasias and metastases.    EC-ECHOCARDIOGRAM COMPLETE W/O CONT    Result Date: 4/15/2022  Transthoracic Echo Report Echocardiography Laboratory CONCLUSIONS Normal transthoracic echocardiogram. RAUDEL CHEN Exam Date:         04/15/2022                    13:27 Exam Location:     Inpatient Priority:          Routine Ordering Physician:        ELEN ROSALES Referring Physician:       [id number] Benitez Sonographer:               Rosalinda Staton [facility] Age:    46     Gender:    M MRN:    [id number] :    1975 BSA:    1.94   Ht (in):    71     Wt (lb):    164 Exam Type:     Complete Indications:     Fever ICD Codes:       780.6 CPT Codes:       88513 BP:   109    /   72     HR:   80 Technical Quality:       Fair MEASUREMENTS  (Male / Female) Normal Values 2D ECHO LV Diastolic Diameter PLAX        4.4 cm                4.2 - 5.9 / 3.9 - 5.3 cm LV Systolic Diameter PLAX         2.7 cm                2.1 - 4.0 cm IVS Diastolic Thickness           0.85 cm               LVPW Diastolic Thickness          0.89 cm               LVOT Diameter                     2.1 cm                Estimated LV Ejection Fraction    48 %                  LV Ejection Fraction MOD BP       61.5 %                >= 55  % LV Ejection Fraction MOD 4C       66.4 %                LV Ejection Fraction MOD 2C       55.9 %                IVC Diameter                      1.6 cm                DOPPLER AV Peak Velocity                  1.4 m/s               AV Peak Gradient                  7.9 mmHg              AV Mean Gradient                  4.5 mmHg              LVOT Peak Velocity                1.1 m/s               AV Area Cont Eq vti                2.6 cm2               Mitral E Point Velocity           0.75 m/s              Mitral E to A Ratio               1.2                   MV Pressure Half Time             48.9 ms               MV Area PHT                       4.5 cm2               MV Deceleration Time              169 ms                TV Peak E Velocity                0.5 m/s               * Indicates values subject to auto-interpretation LV EF:  48    % FINDINGS Left Ventricle Normal left ventricular chamber size. Normal left ventricular wall thickness. Normal left ventricular systolic function. The left ventricular ejection fraction is visually estimated to be 55%. Normal regional wall motion. Normal diastolic function. Right Ventricle The right ventricle is normal in size and systolic function. Right Atrium The right atrium is normal in size. Normal inferior vena cava size and inspiratory collapse. Left Atrium The left atrium is normal in size. Left atrial volume index is 16 mL/sq m. Mitral Valve Structurally normal mitral valve without significant stenosis or regurgitation. Aortic Valve Structurally normal aortic valve without significant stenosis or regurgitation. Tricuspid Valve Structurally normal tricuspid valve without significant stenosis or regurgitation. Unable to estimate right ventricular systolic pressure due to an inadequate tricuspid regurgitant jet. Pulmonic Valve No pulmonic stenosis. Trace pulmonic insufficiency. Pericardium Normal pericardium without effusion. Aorta Normal aortic root for body surface area. The ascending aorta diameter is 3.3 cm. Héctor Hancock MD (Electronically Signed) Final Date:     15 April 2022                 15:16    DX-HIP-UNILATERAL-W PELVIS-3 VIEWS RIGHT    Result Date: 4/5/2022  Radiographs of the pelvis and the right hip were independently reviewed did not show any evidence of fracture no lesions are noted.  He does have bilateral cam lesions with mild to moderate bilateral arthritic  changes..      Micro:  Results     Procedure Component Value Units Date/Time    CULTURE WOUND W/ GRAM STAIN [[id number]]  (Abnormal)  (Susceptibility) Collected: 04/16/22 1241    Order Status: Completed Specimen: Wound Updated: 04/18/22 1212     Significant Indicator POS     Source WND     Site Right Psoas Abscess     Culture Result -     Gram Stain Result Many WBCs.  Many Gram positive cocci.       Culture Result Staphylococcus aureus  Heavy growth  This isolate is presumed to be clindamycin resistant based on  detection of inducible resistance.      Narrative:      Collected By: [id number] JESSY CARMONA  Psoas abscess right  Aerobic and Anaerobic please  Collected By: [id number] JESSY CARMONA    Susceptibility     Staphylococcus aureus (1)     Antibiotic Interpretation Microscan   Method Status    Azithromycin Resistant >4 mcg/mL CLEMENTINA Final    Clindamycin Resistant <=0.25 mcg/mL CLEMENTINA Final    Cefazolin Sensitive <=8 mcg/mL CLEMENTINA Final    Cefepime Sensitive <=4 mcg/mL CLEMENTINA Final    Ceftaroline Sensitive <=0.5 mcg/mL CLEMENTINA Final    Daptomycin Sensitive <=0.5 mcg/mL CLEMENTINA Final    Erythromycin Resistant >4 mcg/mL CLEMENTINA Final    Ampicillin/sulbactam Sensitive <=8/4 mcg/mL CLEMENTINA Final    Vancomycin Sensitive 1 mcg/mL CLEMENTINA Final    Oxacillin Sensitive <=0.25 mcg/mL CLEMENTINA Final    Pip/Tazobactam Sensitive <=8 mcg/mL CLEMENTINA Final    Trimeth/Sulfa Sensitive <=0.5/9.5 mcg/mL CLEMENTINA Final    Tetracycline Sensitive <=4 mcg/mL CLEMENTINA Final                   Blood Culture [[id number]]  (Abnormal)  (Susceptibility) Collected: 04/14/22 1830    Order Status: Completed Specimen: Blood from Peripheral Updated: 04/17/22 1022     Significant Indicator POS     Source BLD     Site PERIPHERAL     Culture Result Growth detected by Bactec instrument. 04/15/2022  21:24  Staphylococcus aureus (methicillin sensitive)  detected by PCR.        Staphylococcus aureus  This isolate is presumed to be clindamycin resistant based on  detection of inducible resistance.      Narrative:   "    CALL  [facility]  131 tel. [phone],  CALLED  131 tel. [phone] 04/15/2022, 21:26, RB PERF. RESULTS CALLED TO:  RN: [id number]  1 of 2 for Blood Culture x 2 sites order. Per Hospital  Policy: Only change Specimen Src: to \"Line\" if specified by  physician order.  Left AC    Susceptibility     Staphylococcus aureus (1)     Antibiotic Interpretation Microscan   Method Status    Azithromycin Resistant >4 mcg/mL CLEMETNINA Final    Clindamycin Resistant <=0.25 mcg/mL CLEMENTINA Final    Cefazolin Sensitive <=8 mcg/mL CLEMENTINA Final    Cefepime Sensitive <=4 mcg/mL CLEMENTINA Final    Ceftaroline Sensitive <=0.5 mcg/mL CLEMENTINA Final    Daptomycin Sensitive 1 mcg/mL CLEMENTINA Final    Erythromycin Resistant >4 mcg/mL CLEMENTINA Final    Ampicillin/sulbactam Sensitive <=8/4 mcg/mL CLEMENTINA Final    Vancomycin Sensitive 1 mcg/mL CLEMENTINA Final    Oxacillin Sensitive <=0.25 mcg/mL CLEMENTINA Final    Pip/Tazobactam Sensitive <=8 mcg/mL CLEMENTINA Final    Trimeth/Sulfa Sensitive <=0.5/9.5 mcg/mL CLEMENTINA Final    Tetracycline Sensitive <=4 mcg/mL CLEMENTINA Final                   BLOOD CULTURE [[id number]] Collected: 04/16/22 1143    Order Status: Completed Specimen: Blood from Peripheral Updated: 04/17/22 0855     Significant Indicator NEG     Source BLD     Site PERIPHERAL     Culture Result No Growth  Note: Blood cultures are incubated for 5 days and  are monitored continuously.Positive blood cultures  are called to the RN and reported as soon as  they are identified.      Narrative:      Per Hospital Policy: Only change Specimen Src: to \"Line\" if  specified by physician order.  Right AC    BLOOD CULTURE [[id number]] Collected: 04/16/22 1143    Order Status: Completed Specimen: Blood from Peripheral Updated: 04/17/22 0855     Significant Indicator NEG     Source BLD     Site PERIPHERAL     Culture Result No Growth  Note: Blood cultures are incubated for 5 days and  are monitored continuously.Positive blood cultures  are called to the RN and reported as soon as  they are identified.      Narrative:      " "Per Hospital Policy: Only change Specimen Src: to \"Line\" if  specified by physician order.  Right Hand    URINE CULTURE(NEW) [[id number]] Collected: 04/14/22 1957    Order Status: Completed Specimen: Urine Updated: 04/16/22 2130     Significant Indicator NEG     Source UR     Site -     Culture Result Usual skin nadiya <10,000 cfu/mL    Narrative:      Indication for culture:->Patient WITHOUT an indwelling Martin  catheter in place with new onset of Dysuria, Frequency,  Urgency, and/or Suprapubic pain    GRAM STAIN [[id number]] Collected: 04/16/22 1241    Order Status: Completed Specimen: Wound Updated: 04/16/22 1855     Significant Indicator .     Source WND     Site Right Psoas Abscess     Gram Stain Result Many WBCs.  Many Gram positive cocci.      Narrative:      Collected By: [id number] JESSY CARMONA  Psoas abscess right  Aerobic and Anaerobic please  Collected By: [id number] JESSY CARMONA    FLUID CULTURE W/GRAM STAIN [[id number]] Collected: 04/16/22 1241    Order Status: Canceled Specimen: Other Body Fluid     MRSA By PCR (Amp) [[id number]] Collected: 04/15/22 0355    Order Status: Completed Specimen: Respirate from Nares Updated: 04/15/22 1842     MRSA by PCR Negative    Narrative:      Collected By: [id number] JAIMIE CARMONA  Per [facility] Lab Protocol    Blood Culture [[id number]] Collected: 04/14/22 1846    Order Status: Completed Specimen: Blood from Peripheral Updated: 04/15/22 0739     Significant Indicator NEG     Source BLD     Site PERIPHERAL     Culture Result No Growth  Note: Blood cultures are incubated for 5 days and  are monitored continuously.Positive blood cultures  are called to the RN and reported as soon as  they are identified.      Narrative:      2 of 2 blood culture x2  Sites order. Per Hospital Policy:  Only change Specimen Src: to \"Line\" if specified by physician  order.  Right AC    URINALYSIS CULTURE, IF INDICATED [[id number]]  (Abnormal) Collected: 04/14/22 1957    Order Status: Completed Specimen: Urine " Updated: 04/14/22 2046     Color Yellow     Character Clear     Specific Gravity 1.019     Ph 6.0     Glucose Negative mg/dL      Ketones Negative mg/dL      Protein Negative mg/dL      Bilirubin Negative     Urobilinogen, Urine 0.2     Nitrite Negative     Leukocyte Esterase Small     Occult Blood Moderate     Micro Urine Req Microscopic    Narrative:      Indication for culture:->Patient WITHOUT an indwelling Martin  catheter in place with new onset of Dysuria, Frequency,  Urgency, and/or Suprapubic pain    Urinalysis [[id number]] Collected: 04/14/22 0000    Order Status: Canceled Specimen: Urine           Assessment:  Active Hospital Problems    Diagnosis    • *Psoas abscess (HCC) [K68.12]    • Septic arthritis of sacroiliac joint (HCC) [M46.58]    • MSSA bacteremia [R78.81, B95.61]    • Sepsis (HCC) [A41.9]    • Leukocytosis [D72.829]    • Chronic pain syndrome [G89.4]    • Thrombocytosis [D75.839]    • Normocytic anemia [D64.9]      Interval 24 hours:      AF, O2 RA  Labs reviewed, no new labs today  Micro reviewed    New onset rash, present on back, legs and arms, potentially due to cefazolin so we will change antibiotics today.    Assessment:  46 y.o. male admitted 4/14/2022 with several weeks h/o pain right low back and hip.  Unclear etiology but did have suprapubic catheter placed due to ureteral obstruction approximately 2 months ago.  Denies any other for material or hardware in his body.     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   MSSA bacteremia  - BCx on 4/14 +MSSA  - BCx on 4/16 - NGTD  Ureteral stricture, plan is for eventual reconstruction, suprapubic catheter in place  -Urine culture negative, no sign of infection   Rash, new onset  -Appearance consistent with drug rash, potentially due to cefazolin so transition to daptomycin      Plan:     ---  Stop cefazolin and transition to daptomycin 8 mg/kg - will plan on a 6-week IV antibiotic course  for bacteremia and osteomyelitis from first negative blood culture, (end 5/28/22)   --- Patient prefers home infusion if able to obtain insurance approval, orders for daptomycin were written on 4/19 and faxed to case management  --- Drain management per IR  --- TTE unrevealing; recommend NAEEM if persistently positive blood cultures or new fevers     Discussed with internal medicine, Dr. [place].  ID will follow.

## 2022-04-19 NOTE — PROGRESS NOTES
"Radiology Progress Note   Author: CRISTOPHER Sahni Date & Time created: 4/19/2022  3:28 PM   Date of admission  4/14/2022  Note to reader: this note follows the APSO format rather than the historical SOAP format. Assessment and plan located at the top of the note for ease of use.    Chief Complaint  46 y.o. male admitted 4/14/2022 with   Chief Complaint   Patient presents with   • Sent by MD Dr. Eb Alcazar from [facility] sent patient over to rule out infection r/t large hematoma of psoas muscle   • Hip Pain     Hip, lower back, and buttocks pain since march. Seen by many specialists.        HPI  \" 46 y.o. male admitted 4/14/2022 with several weeks h/o pain right low back and hip.  Unclear etiology but did have suprapubic catheter placed due to ureteral obstruction approximately 2 months ago.  Denies any other for material or hardware in his body\"      Assessment/Plan  Interval History   Principal Problem:    Psoas abscess (HCC)  Active Problems:    Sepsis (HCC)    Leukocytosis    Chronic pain syndrome    Thrombocytosis    Normocytic anemia    MSSA bacteremia    Septic arthritis of sacroiliac joint (HCC)    Rash and nonspecific skin eruption      Plan IR  - Irrigate right posterior drain with 10 ml of sterile saline twice per shift   - Psoas fluid cultures + Staphylococcus aureus   - Blood cultures + MSSA  - ID following, managing antibiotics   - Recommend follow up CT scan in 5-7 days 4/21   - Continue to monitor drains, VS and labs        IR:   4/16-  Right iliacus abscess   4/18 - 20 ml serosanguinous fluid, MRI T spine diffuse low marrow signal intensity throughout the imaged osseous structures. Nonspecific finding which can be seen in the setting of chronic disease, diffuse infiltrating processes such as multiple myeloma, blood dyscrasias and metastases.    4/19- Serosanguinous drainage, starting to become serous in color, WBC WNL 8.8         Review of Systems  Physical Exam   Review of Systems "   Constitutional: Positive for malaise/fatigue. Negative for chills and fever.   HENT: Negative for hearing loss.    Eyes: Negative for blurred vision.   Respiratory: Negative for cough, hemoptysis and shortness of breath.    Cardiovascular: Negative for chest pain and palpitations.   Gastrointestinal: Negative for abdominal pain and vomiting.   Genitourinary: Positive for dysuria and flank pain.   Musculoskeletal: Negative for myalgias.   Skin: Negative for rash.   Neurological: Positive for focal weakness. Negative for dizziness, weakness and headaches.   Endo/Heme/Allergies: Does not bruise/bleed easily.   Psychiatric/Behavioral: Negative for suicidal ideas.      Vitals:    04/19/22 0805   BP: 125/85   Pulse: 81   Resp: 18   Temp: 37 °C (98.6 °F)   SpO2: 96%        Physical Exam  Constitutional:       Appearance: Normal appearance.   HENT:      Head: Normocephalic.      Nose: Nose normal.      Mouth/Throat:      Mouth: Mucous membranes are moist.   Eyes:      Pupils: Pupils are equal, round, and reactive to light.   Cardiovascular:      Rate and Rhythm: Normal rate.   Pulmonary:      Effort: Pulmonary effort is normal. No respiratory distress.   Abdominal:      General: Abdomen is flat.      Tenderness: There is no abdominal tenderness.      Comments: Suprapubic catheter    Musculoskeletal:         General: Tenderness present. No deformity.      Cervical back: Normal range of motion.        Back:    Skin:     General: Skin is warm and dry.      Capillary Refill: Capillary refill takes less than 2 seconds.      Coloration: Skin is not jaundiced or pale.   Neurological:      Mental Status: He is alert and oriented to person, place, and time.      Motor: Weakness present.   Psychiatric:         Mood and Affect: Mood normal.         Behavior: Behavior normal.             Labs    Recent Labs     04/18/22  0450 04/19/22  1406   WBC 9.5 8.8   RBC 3.49* 3.70*   HEMOGLOBIN 10.3* 11.0*   HEMATOCRIT 32.0* 33.9*   MCV 91.7  91.6   MCH 29.5 29.7   MCHC 32.2* 32.4*   RDW 41.8 42.0   PLATELETCT 376 427   MPV 8.0* 8.2*     Recent Labs     04/18/22  0450 04/19/22  1406   SODIUM 136 140   POTASSIUM 4.1 4.7   CHLORIDE 98 101   CO2 30 28   GLUCOSE 115* 100*   BUN 13 13   CREATININE 0.92 0.97   CALCIUM 9.0 8.8     Recent Labs     04/18/22  0450 04/19/22  1406   ALBUMIN 3.2 3.6   CREATININE 0.92 0.97     IR-PICC LINE PLACEMENT W/ GUIDANCE > AGE 5   Final Result                  Ultrasound-guided PICC placement performed by qualified nursing staff as    above.          MR-THORACIC SPINE-WITH & W/O   Final Result      1.  No evidence of acute abnormality or abnormal enhancement in the thoracic spine.   2.  Diffuse low marrow signal intensity throughout the imaged osseous structures. Nonspecific finding which can be seen in the setting of chronic disease, diffuse infiltrating processes such as multiple myeloma, blood dyscrasias and metastases.      MR-CERVICAL SPINE-WITH & W/O   Final Result      1.  No acute abnormality or abnormal enhancement in the cervical spine.   2.  Multilevel degenerative changes of the cervical spine as described above.   3.  Diffuse low marrow signal intensity throughout the imaged osseous structures. Nonspecific finding which can be seen in the setting of chronic disease, diffuse infiltrating processes such as multiple myeloma, blood dyscrasias and metastases.      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 There is increased T2 fluid signal intensity in the right sacroiliac joint space also suspicious for septic arthritis.      Attempts to convey these findings to Dr. DAMON LEVI were initiated on 4/17/2022 5:44 PM.      Findings were discussed with Dr. Weston on 4/17/2022 at approximately 1800 hours.      CT-DRAIN-RETROPERITONEAL   Final Result      1.  CT guided retroperitoneal right lower quadrant catheter drainage.   2.  The current plan is to obtain a  follow-up CT scan in 5-7 days.      CT-ABDOMEN-PELVIS WITH   Final Result      1.  Right posterior/inferior psoas muscle fluid collection which extends into the anterior/inferior iliacus muscle and measures 6.5 x 5 x 2.5 cm in size. This likely represents abscess versus hematoma. There is also asymmetric enlargement of the right    psoas and iliac vessels consistent with myositis.      2.  Sclerosis and erosive change of the right SI joint. Consideration should be given for inflammatory or infectious sacroiliitis.      3.  Bibasilar atelectasis.      EC-ECHOCARDIOGRAM COMPLETE W/O CONT   Final Result          INR   Date Value Ref Range Status   04/14/2022 1.25 (H) 0.87 - 1.13 Final     Comment:     INR - Non-therapeutic Reference Range: 0.87-1.13  INR - Therapeutic Reference Range: 2.0-4.0       No results found for: POCINR     Intake/Output Summary (Last 24 hours) at 4/18/2022 1429  Last data filed at 4/18/2022 0900  Gross per 24 hour   Intake --   Output 3980 ml   Net -3980 ml      Labs not explicitly included in this progress note were reviewed by the author. Radiology/imaging not explicitly included in this progress note was reviewed by the author.     I have performed a physical exam and reviewed and updated ROS and Plan today (4/19/2022).     15 minutes in directly providing and coordinating care and extensive data review.  No time overlap and excludes procedures.

## 2022-04-20 ENCOUNTER — APPOINTMENT (OUTPATIENT)
Dept: RADIOLOGY | Facility: MEDICAL CENTER | Age: 47
DRG: 871 | End: 2022-04-20
Attending: NURSE PRACTITIONER
Payer: OTHER MISCELLANEOUS

## 2022-04-20 LAB — CK SERPL-CCNC: 53 U/L (ref 0–154)

## 2022-04-20 PROCEDURE — A9270 NON-COVERED ITEM OR SERVICE: HCPCS | Performed by: STUDENT IN AN ORGANIZED HEALTH CARE EDUCATION/TRAINING PROGRAM

## 2022-04-20 PROCEDURE — A9270 NON-COVERED ITEM OR SERVICE: HCPCS | Performed by: INTERNAL MEDICINE

## 2022-04-20 PROCEDURE — 74177 CT ABD & PELVIS W/CONTRAST: CPT

## 2022-04-20 PROCEDURE — 99233 SBSQ HOSP IP/OBS HIGH 50: CPT | Performed by: INTERNAL MEDICINE

## 2022-04-20 PROCEDURE — 82550 ASSAY OF CK (CPK): CPT

## 2022-04-20 PROCEDURE — 700102 HCHG RX REV CODE 250 W/ 637 OVERRIDE(OP): Performed by: STUDENT IN AN ORGANIZED HEALTH CARE EDUCATION/TRAINING PROGRAM

## 2022-04-20 PROCEDURE — 700102 HCHG RX REV CODE 250 W/ 637 OVERRIDE(OP): Performed by: INTERNAL MEDICINE

## 2022-04-20 PROCEDURE — 700105 HCHG RX REV CODE 258: Performed by: INTERNAL MEDICINE

## 2022-04-20 PROCEDURE — 700111 HCHG RX REV CODE 636 W/ 250 OVERRIDE (IP): Performed by: INTERNAL MEDICINE

## 2022-04-20 PROCEDURE — 700117 HCHG RX CONTRAST REV CODE 255: Performed by: NURSE PRACTITIONER

## 2022-04-20 PROCEDURE — 770001 HCHG ROOM/CARE - MED/SURG/GYN PRIV*

## 2022-04-20 RX ADMIN — OXYCODONE 5 MG: 5 TABLET ORAL at 03:22

## 2022-04-20 RX ADMIN — SENNOSIDES AND DOCUSATE SODIUM 2 TABLET: 50; 8.6 TABLET ORAL at 17:30

## 2022-04-20 RX ADMIN — GABAPENTIN 300 MG: 300 CAPSULE ORAL at 17:30

## 2022-04-20 RX ADMIN — OXYCODONE 5 MG: 5 TABLET ORAL at 17:36

## 2022-04-20 RX ADMIN — IOHEXOL 100 ML: 350 INJECTION, SOLUTION INTRAVENOUS at 14:20

## 2022-04-20 RX ADMIN — OXYCODONE HYDROCHLORIDE 10 MG: 10 TABLET ORAL at 23:58

## 2022-04-20 RX ADMIN — GABAPENTIN 300 MG: 300 CAPSULE ORAL at 04:31

## 2022-04-20 RX ADMIN — RIVAROXABAN 10 MG: 10 TABLET, FILM COATED ORAL at 17:30

## 2022-04-20 RX ADMIN — SENNOSIDES AND DOCUSATE SODIUM 2 TABLET: 50; 8.6 TABLET ORAL at 04:31

## 2022-04-20 RX ADMIN — OXYCODONE 5 MG: 5 TABLET ORAL at 20:50

## 2022-04-20 RX ADMIN — DAPTOMYCIN 600 MG: 500 INJECTION, POWDER, LYOPHILIZED, FOR SOLUTION INTRAVENOUS at 17:30

## 2022-04-20 RX ADMIN — OXYCODONE 5 MG: 5 TABLET ORAL at 14:36

## 2022-04-20 ASSESSMENT — ENCOUNTER SYMPTOMS
FEVER: 0
DIARRHEA: 0
ORTHOPNEA: 0
CHILLS: 0
SORE THROAT: 0
SHORTNESS OF BREATH: 0
EYE PAIN: 0
MYALGIAS: 0
VOMITING: 0
COUGH: 0
PALPITATIONS: 0
WEAKNESS: 0
HEADACHES: 0
HEMOPTYSIS: 0
SPUTUM PRODUCTION: 0
FOCAL WEAKNESS: 0
BRUISES/BLEEDS EASILY: 0
DIZZINESS: 0
FLANK PAIN: 1
DEPRESSION: 0
NAUSEA: 0
ABDOMINAL PAIN: 0
BACK PAIN: 1
BLURRED VISION: 0

## 2022-04-20 ASSESSMENT — PAIN DESCRIPTION - PAIN TYPE
TYPE: ACUTE PAIN;SURGICAL PAIN

## 2022-04-20 ASSESSMENT — FIBROSIS 4 INDEX: FIB4 SCORE: 0.3

## 2022-04-20 NOTE — DISCHARGE PLANNING
Left LIANET Crum RN at [facility] care regading discharge tomorrow and IV teaching on Dapto.

## 2022-04-20 NOTE — CARE PLAN
Problem: Pain - Standard  Goal: Alleviation of pain or a reduction in pain to the patient’s comfort goal  Outcome: Progressing     Problem: Knowledge Deficit - Standard  Goal: Patient and family/care givers will demonstrate understanding of plan of care, disease process/condition, diagnostic tests and medications  Outcome: Progressing       The patient is Stable - Low risk of patient condition declining or worsening    Shift Goals  Clinical Goals: Pain control, flush BENJAMIN drain  Patient Goals: Pain control, comfort  Family Goals:     Progress made toward(s) clinical / shift goals:  Taught pt 0-10 pain scale and non-pharmacological method of pain management, encouraged to verbalize when in pain. Administered PRN pain meds as needed. BENJAMIN drain flushed per orders.     Patient is not progressing towards the following goals:

## 2022-04-20 NOTE — PROGRESS NOTES
Hospital Medicine Daily Progress Note    Date of Service  4/20/2022    Chief Complaint  Gary Ramon is a 46 y.o. male admitted 4/14/2022 with   Chief Complaint   Patient presents with   • Sent by MD Dr. Eb Alcazar from [facility] sent patient over to rule out infection r/t large hematoma of psoas muscle   • Hip Pain     Hip, lower back, and buttocks pain since march. Seen by many specialists.          Hospital Course  This is a 46 year-old male with a past medical significant for chronic sciatica on the right side went to chiropractor with hope that his pain will get relieved.  When he went to chiropractor with some manipulation he was noted to have severe pain to the extent that he is unable to move.    Patient went to spine surgery and Dr Romero recommended following up with Dr. Alcazar.  He had an MRI on 4/7/2022 showing significant fluid within the psoas and iliac is muscle bellies as well as posteriorly near the piriformis concerning for hematoma or abscess.  This patient was sent to ER by orthopedic surgery.    Also reports associated with fever is 102 with the last 4 days.  Upon presentation in ER, is noted to be tachycardic with heart rate of 112, WBC 16.9, lactic acid 2.1, CRP 14.1, patient had a suprapubic catheter, which will be changed.    Blood Cx X 2 on 4/14 grew MSSA.  Repeat blood culture in 4/166/2022 no growth to date, will obtain PICC line. Paatient underwent IR guided drainage on 4/16/2022, culture growing MSSA.  Drain is managed by IR. will need IV antibiotics till 5/20/2022.  While patient was in IV antibiotics, will need CBC, CMP, ESR, CRP every week.    ID consulted, patient antibiotics was changed to Ancef. As perrecommendation MRI of the cervical spine thoracic and lumbar spine has been ordered obtained, noted to have septic arthritis of sacroiliac joint.  I discussed with Dr. Romero we stated no intervention indicated at this time.  He recommended continuing IV antibiotics.      Interval  events:  -- No acute events overnight, patient complained of being nauseous, will provide Zofran.  Continue to complain of the pain on the right hip and buttock area. This gets worse with movement.  --Patient has been stable, heart rate 83-91.  Blood pressure improved, 99/61  --Trend lactic acid.  Unfortunately no blood work was obtained at the time of dictation  Depending upon the culture and sensitivity will consider calling ID.  Plan of care has been discussed with patient, nursing staff, case management    4/16:  -- No acute events overnight.  Blood cultures growing MSSA, antibiotics changed to Ancef..  Echocardiogram did not show any vegetation.  Patient does have a psoas abscess on CT scan of the abdomen and pelvis.  Patient is n.p.o., will be going for IR guided drainage.  -- Continue to complain of pain in his right pelvic area.  -- Currently n.p.o.    4/17:  -- No acute events overnight, vital sign has been stable, patient underwent IR guided drainage on 4/16, culture pending.  ID consulted, currently patient is on Ancef.  Repeat blood culture pending.  If repeat blood culture has been negative for 48 hours, will provide PICC line.  --Patient states that she hip his movement has been decreased on the right lower extremity secondary to pain.  MRI of the cervical, T-spine, lumbar spine has been ordered as per ID recommendation.  --Echocardiogram did not show any vegetation  --Continue IV Ancef  --Monitor drain output    4/18:  -- No acute events overnight, medicine has been stable, patient report that his pain has gotten significantly better.  MRI of the lumbar spine reviewed, noted to have septic arthritis of sacroiliac joint.  -- Patient blood culture obtained on 4/16/2020 has no growth to date.  We will obtain PICC line  -- Continue IV antibiotics Ancef, last day being 5/28/2022  -- TTE did not show any endocarditis  We will follow ID recommendation.  Drain management per IR  Multimodal pain management  including narcotic and gabapentin  -- Vitals and has been stable, currently saturating well on room air, blood pressure 104//68, patient denies any lightheadedness associated with this.  Heart rate at 77- 82    4/19-patient is developing a rash on his back, upper arm.  Rashes mobility form, not blanching, positive itchiness.  Seems like drug reaction.  Patient is on cefazolin.  I did notify infectious disease, Dr. English about the rash.  Benadryl cream ordered.  No other systemic signs, as fever, shortness of breath, swelling.  Vitals remained stable  PICC line was placed today  Cultures positive for staph, clindamycin resistant.  Infectious disease following.  Appreciate their recommendations in regards to antibiotic.  IR following for drainage management.  Patient will need a repeat CT scan 4/21.    4/20- no event overnight. Pain is improving overall. Pt using pain meds only late afternoon. Trying to avoid during the day.   Switched to daptomycin due to rash. It is improving. No fever  CT scan hip today to evaluate for drain to be removed or not. IR following. Appreciate their recs  Vitals and labs reviewed, and they are unremarkable.   Goal to transition care home tomorrow.     I have personally seen and examined the patient at bedside. I discussed the plan of care with patient, bedside RN, charge RN and .    Consultants/Specialty  IR  Code Status  Full Code    Disposition  Patient is NOT MEDICALLY CLEARED for discharge.   Anticipate discharge to to home with close outpatient follow-up.  I have placed the appropriate orders for post-discharge needs.    Review of Systems  Review of Systems   Constitutional: Negative for malaise/fatigue.   HENT: Negative for congestion and sore throat.    Eyes: Negative for pain.   Respiratory: Negative for cough, hemoptysis and sputum production.    Cardiovascular: Negative for chest pain, orthopnea and leg swelling.   Gastrointestinal: Negative for abdominal pain,  diarrhea and nausea.   Genitourinary: Negative for dysuria and frequency.        Patient has a suprapubic catheter   Musculoskeletal: Positive for back pain and joint pain.   Neurological: Negative for focal weakness, weakness and headaches.   Psychiatric/Behavioral: Negative for depression.   All other systems reviewed and are negative.       Physical Exam  Temp:  [36.2 °C (97.2 °F)-37 °C (98.6 °F)] 36.5 °C (97.7 °F)  Pulse:  [] 75  Resp:  [18] 18  BP: (111-137)/(66-94) 113/69  SpO2:  [91 %-96 %] 92 %    Physical Exam  Vitals and nursing note reviewed.   Constitutional:       Appearance: Normal appearance.   HENT:      Head: Atraumatic.   Eyes:      Extraocular Movements: Extraocular movements intact.      Pupils: Pupils are equal, round, and reactive to light.   Cardiovascular:      Rate and Rhythm: Normal rate.      Pulses: Normal pulses.   Pulmonary:      Effort: No respiratory distress.      Breath sounds: Normal breath sounds.   Abdominal:      General: Bowel sounds are normal. There is no distension.      Palpations: Abdomen is soft.      Tenderness: There is no abdominal tenderness.   Musculoskeletal:      Cervical back: Neck supple.      Right lower leg: No edema.      Left lower leg: No edema.      Comments: Decreased ROm in Right side 2/2 pain  TTP on palpation   Skin:     General: Skin is warm.   Neurological:      Mental Status: He is alert and oriented to person, place, and time.      Cranial Nerves: No cranial nerve deficit.      Comments: Patient stated that because of pain , he has difficulty movement with his leg    Psychiatric:         Mood and Affect: Mood normal.         Fluids    Intake/Output Summary (Last 24 hours) at 4/20/2022 1409  Last data filed at 4/20/2022 1100  Gross per 24 hour   Intake 240 ml   Output 810 ml   Net -570 ml       Laboratory  Recent Labs     04/18/22  0450 04/19/22  1406   WBC 9.5 8.8   RBC 3.49* 3.70*   HEMOGLOBIN 10.3* 11.0*   HEMATOCRIT 32.0* 33.9*   MCV 91.7  91.6   MCH 29.5 29.7   MCHC 32.2* 32.4*   RDW 41.8 42.0   PLATELETCT 376 427   MPV 8.0* 8.2*     Recent Labs     04/18/22  0450 04/19/22  1406   SODIUM 136 140   POTASSIUM 4.1 4.7   CHLORIDE 98 101   CO2 30 28   GLUCOSE 115* 100*   BUN 13 13   CREATININE 0.92 0.97   CALCIUM 9.0 8.8                   Imaging  IR-PICC LINE PLACEMENT W/ GUIDANCE > AGE 5   Final Result                  Ultrasound-guided PICC placement performed by qualified nursing staff as    above.          MR-THORACIC SPINE-WITH & W/O   Final Result      1.  No evidence of acute abnormality or abnormal enhancement in the thoracic spine.   2.  Diffuse low marrow signal intensity throughout the imaged osseous structures. Nonspecific finding which can be seen in the setting of chronic disease, diffuse infiltrating processes such as multiple myeloma, blood dyscrasias and metastases.      MR-CERVICAL SPINE-WITH & W/O   Final Result      1.  No acute abnormality or abnormal enhancement in the cervical spine.   2.  Multilevel degenerative changes of the cervical spine as described above.   3.  Diffuse low marrow signal intensity throughout the imaged osseous structures. Nonspecific finding which can be seen in the setting of chronic disease, diffuse infiltrating processes such as multiple myeloma, blood dyscrasias and metastases.      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 There is increased T2 fluid signal intensity in the right sacroiliac joint space also suspicious for septic arthritis.      Attempts to convey these findings to Dr. DAMON LEVI were initiated on 4/17/2022 5:44 PM.      Findings were discussed with Dr. Weston on 4/17/2022 at approximately 1800 hours.      CT-DRAIN-RETROPERITONEAL   Final Result      1.  CT guided retroperitoneal right lower quadrant catheter drainage.   2.  The current plan is to obtain a follow-up CT scan in 5-7 days.      CT-ABDOMEN-PELVIS WITH   Final  Result      1.  Right posterior/inferior psoas muscle fluid collection which extends into the anterior/inferior iliacus muscle and measures 6.5 x 5 x 2.5 cm in size. This likely represents abscess versus hematoma. There is also asymmetric enlargement of the right    psoas and iliac vessels consistent with myositis.      2.  Sclerosis and erosive change of the right SI joint. Consideration should be given for inflammatory or infectious sacroiliitis.      3.  Bibasilar atelectasis.      EC-ECHOCARDIOGRAM COMPLETE W/O CONT   Final Result      CT-ABDOMEN-PELVIS WITH    (Results Pending)        Assessment/Plan  * Psoas abscess (HCC)- (present on admission)  Assessment & Plan  Noted in MRI on 4/7/2022. CT scan showing Right posterior/inferior psoas muscle fluid collection which extends into the anterior/inferior iliacus muscle and measures 6.5 x 5 x 2.5 cm in size on 4/16   --Status post IR guided drainage placement on 4/16/2022  Culture growing MSSA  Continue IV Ancef as per infectious disease recommendation, last day being 5/28/2022 4/19-patient developed a rash, concerning for drug reaction from cefazolin.  Monitor electrolytes, kidney function, complete blood count.  Appreciate infectious disease recommendations.  Drain in place.  Patient will need repeat CT scan 4/21 4/20- ok to repeat CT scan today per IR. Drain management per IR. Antibiotic upgraded to daptomycin    Rash and nonspecific skin eruption- (present on admission)  Assessment & Plan  4/19-Possible drug reaction.  See above  Benadryl cream as needed.  No other systemic manifestation.  We will follow-up with infectious disease  4/20- resolving     Septic arthritis of sacroiliac joint (HCC)- (present on admission)  Assessment & Plan  MRI of the lumbar spine is consistent with septic arthritis of sacroiliac joint.  Patient underwent  IR guided drainage of psoas abscess consistent with MSSA   --Discussed with spine surgery Dr. Romero who recommended no  intervention indicated.  He recommended treating with IV antibiotics.  ID is already consulted, will follow their recommendation    MSSA bacteremia- (present on admission)  Assessment & Plan  Blood culture obtained on 4/14/2020 grew MSSA.  Repeat blood culture  On 4/16 NGTD echocardiogram did not show any vegetation.  ID following and recs MR CTL spine; ordered   Likely the source of the infection is from the psoas abscess.  PIcc line placed   4/20- switched to daptomycin    Normocytic anemia- (present on admission)  Assessment & Plan  Most likely anemia of chronic inflammation/infection     Thrombocytosis- (present on admission)  Assessment & Plan  Resolved, likely reactive secondary to infection    Chronic pain syndrome- (present on admission)  Assessment & Plan  Multimodal Pain control including Tylenol, Robaxin, gabapentin, narcotic pain medication    Leukocytosis- (present on admission)  Assessment & Plan  Resolved down, monitor, continue IV antibiotics    Sepsis (HCC)  Assessment & Plan  This is Sepsis Present on admission  SIRS criteria identified on my evaluation include: Tachycardia, with heart rate greater than 90 BPM, Leukocytosis, with WBC greater than 12,000 and Bandemia, greater than 10% bands  Source is abscess  Sepsis protocol initiated  Fluid resuscitation ordered per protocol  Crystalloid Fluid Administration: Fluid resuscitation ordered per standard protocol - 30 mL/kg per current or ideal body weight  IV antibiotics as appropriate for source of sepsis  Reassessment: I have reassessed the patient's hemodynamic status    SIRS criteria being tachycardia with heart rate greater than 90, WBC greater than 12, source of infection  Sepsis.  Patient is also found to have MSSA bacteremia  , Continue IV Ancef, ID following  Resolved         VTE prophylaxis: xarelto    I have performed a physical exam and reviewed and updated ROS and Plan today (4/20/2022). In review of yesterday's note (4/19/2022), there  are no changes except as documented above.

## 2022-04-20 NOTE — PROGRESS NOTES
"Radiology Progress Note   Author: CRISTOPHER Sahni Date & Time created: 4/20/2022  12:29 PM   Date of admission  4/14/2022  Note to reader: this note follows the APSO format rather than the historical SOAP format. Assessment and plan located at the top of the note for ease of use.    Chief Complaint  46 y.o. male admitted 4/14/2022 with   Chief Complaint   Patient presents with   • Sent by MD Dr. Eb Alcazar from [facility] sent patient over to rule out infection r/t large hematoma of psoas muscle   • Hip Pain     Hip, lower back, and buttocks pain since march. Seen by many specialists.        HPI  \" 46 y.o. male admitted 4/14/2022 with several weeks h/o pain right low back and hip.  Unclear etiology but did have suprapubic catheter placed due to ureteral obstruction approximately 2 months ago.  Denies any other for material or hardware in his body\"      Assessment/Plan  Interval History   Principal Problem:    Psoas abscess (HCC)  Active Problems:    Sepsis (HCC)    Leukocytosis    Chronic pain syndrome    Thrombocytosis    Normocytic anemia    MSSA bacteremia    Septic arthritis of sacroiliac joint (HCC)    Rash and nonspecific skin eruption      Plan IR  - Irrigate right posterior drain with 10 ml of sterile saline twice per shift   - Psoas fluid cultures + Staphylococcus aureus   - Blood cultures + MSSA  - ID following, managing antibiotics   - Low drain output, repeat CT scan today 4/20    - Continue to monitor drains, VS and labs        IR:   4/16-  Right iliacus abscess   4/18 - 20 ml serosanguinous fluid, MRI T spine diffuse low marrow signal intensity throughout the imaged osseous structures. Nonspecific finding which can be seen in the setting of chronic disease, diffuse infiltrating processes such as multiple myeloma, blood dyscrasias and metastases.    4/19- Serosanguinous drainage, starting to become serous in color, WBC WNL 8.8    4/20- Decreasing drain output 10 ml today, fluid becoming serous " in color     Review of Systems  Physical Exam   Review of Systems   Constitutional: Positive for malaise/fatigue. Negative for chills and fever.   HENT: Negative for hearing loss.    Eyes: Negative for blurred vision.   Respiratory: Negative for cough, hemoptysis and shortness of breath.    Cardiovascular: Negative for chest pain and palpitations.   Gastrointestinal: Negative for abdominal pain and vomiting.   Genitourinary: Positive for dysuria and flank pain.   Musculoskeletal: Negative for myalgias.   Skin: Negative for rash.   Neurological: Negative for dizziness, weakness and headaches.   Endo/Heme/Allergies: Does not bruise/bleed easily.   Psychiatric/Behavioral: Negative for suicidal ideas.      Vitals:    04/20/22 0727   BP: 113/69   Pulse: 75   Resp: 18   Temp: 36.5 °C (97.7 °F)   SpO2: 92%        Physical Exam  Constitutional:       Appearance: Normal appearance.   HENT:      Head: Normocephalic.      Nose: Nose normal.      Mouth/Throat:      Mouth: Mucous membranes are moist.   Eyes:      Pupils: Pupils are equal, round, and reactive to light.   Cardiovascular:      Rate and Rhythm: Normal rate.   Pulmonary:      Effort: Pulmonary effort is normal. No respiratory distress.   Abdominal:      General: Abdomen is flat.      Tenderness: There is no abdominal tenderness.      Comments: Suprapubic catheter    Musculoskeletal:         General: Tenderness present. No deformity.      Cervical back: Normal range of motion.        Back:    Skin:     General: Skin is warm and dry.      Capillary Refill: Capillary refill takes less than 2 seconds.      Coloration: Skin is not jaundiced or pale.   Neurological:      Mental Status: He is alert and oriented to person, place, and time.      Motor: Weakness present.   Psychiatric:         Mood and Affect: Mood normal.         Behavior: Behavior normal.             Labs    Recent Labs     04/18/22  0450 04/19/22  1406   WBC 9.5 8.8   RBC 3.49* 3.70*   HEMOGLOBIN 10.3*  11.0*   HEMATOCRIT 32.0* 33.9*   MCV 91.7 91.6   MCH 29.5 29.7   MCHC 32.2* 32.4*   RDW 41.8 42.0   PLATELETCT 376 427   MPV 8.0* 8.2*     Recent Labs     04/18/22  0450 04/19/22  1406   SODIUM 136 140   POTASSIUM 4.1 4.7   CHLORIDE 98 101   CO2 30 28   GLUCOSE 115* 100*   BUN 13 13   CREATININE 0.92 0.97   CALCIUM 9.0 8.8     Recent Labs     04/18/22  0450 04/19/22  1406   ALBUMIN 3.2 3.6   CREATININE 0.92 0.97     IR-PICC LINE PLACEMENT W/ GUIDANCE > AGE 5   Final Result                  Ultrasound-guided PICC placement performed by qualified nursing staff as    above.          MR-THORACIC SPINE-WITH & W/O   Final Result      1.  No evidence of acute abnormality or abnormal enhancement in the thoracic spine.   2.  Diffuse low marrow signal intensity throughout the imaged osseous structures. Nonspecific finding which can be seen in the setting of chronic disease, diffuse infiltrating processes such as multiple myeloma, blood dyscrasias and metastases.      MR-CERVICAL SPINE-WITH & W/O   Final Result      1.  No acute abnormality or abnormal enhancement in the cervical spine.   2.  Multilevel degenerative changes of the cervical spine as described above.   3.  Diffuse low marrow signal intensity throughout the imaged osseous structures. Nonspecific finding which can be seen in the setting of chronic disease, diffuse infiltrating processes such as multiple myeloma, blood dyscrasias and metastases.      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 There is increased T2 fluid signal intensity in the right sacroiliac joint space also suspicious for septic arthritis.      Attempts to convey these findings to Dr. DAMON LEVI were initiated on 4/17/2022 5:44 PM.      Findings were discussed with Dr. Weston on 4/17/2022 at approximately 1800 hours.      CT-DRAIN-RETROPERITONEAL   Final Result      1.  CT guided retroperitoneal right lower quadrant catheter  drainage.   2.  The current plan is to obtain a follow-up CT scan in 5-7 days.      CT-ABDOMEN-PELVIS WITH   Final Result      1.  Right posterior/inferior psoas muscle fluid collection which extends into the anterior/inferior iliacus muscle and measures 6.5 x 5 x 2.5 cm in size. This likely represents abscess versus hematoma. There is also asymmetric enlargement of the right    psoas and iliac vessels consistent with myositis.      2.  Sclerosis and erosive change of the right SI joint. Consideration should be given for inflammatory or infectious sacroiliitis.      3.  Bibasilar atelectasis.      EC-ECHOCARDIOGRAM COMPLETE W/O CONT   Final Result      CT-ABDOMEN-PELVIS WITH    (Results Pending)       INR   Date Value Ref Range Status   04/14/2022 1.25 (H) 0.87 - 1.13 Final     Comment:     INR - Non-therapeutic Reference Range: 0.87-1.13  INR - Therapeutic Reference Range: 2.0-4.0       No results found for: POCINR     Intake/Output Summary (Last 24 hours) at 4/18/2022 1429  Last data filed at 4/18/2022 0900  Gross per 24 hour   Intake --   Output 3980 ml   Net -3980 ml      Labs not explicitly included in this progress note were reviewed by the author. Radiology/imaging not explicitly included in this progress note was reviewed by the author.     I have performed a physical exam and reviewed and updated ROS and Plan today (4/20/2022).     10 minutes in directly providing and coordinating care and extensive data review.  No time overlap and excludes procedures.

## 2022-04-20 NOTE — PROGRESS NOTES
Infectious Disease Progress Note    Author: Lisa English M.D. Date & Time of service: 2022  3:19 PM    Chief Complaint:  psoas abscess   MSSA bacteremia, sepsis resolved     Interval History:  46 y.o. male admitted 2022 with several weeks h/o pain right low back and hip found to have above   AF WBC 9.9 tolerating abx-has abd drain right No N/V. BP low, no symptoms   AF, O2 RA, Psoas drain output 30 cc in last 24 hours. doing well overall with some mild tenderness around drain site but no significant back pain or any other new joint pain.     AF, O2 RA, rash, present on back, legs and arms, potentially due to cefazolin so we will change antibiotics today.    Review of Systems:  ROS    Hemodynamics:  Temp (24hrs), Av.6 °C (97.9 °F), Min:36.2 °C (97.2 °F), Max:37 °C (98.6 °F)  Temperature: 36.5 °C (97.7 °F)  Pulse  Av.4  Min: 68  Max: 112   Blood Pressure: 113/69       Physical Exam:  Physical Exam    Meds:    Current Facility-Administered Medications:   •  diphenhydrAMINE-zinc acetate  •  rivaroxaban  •  DAPTOmycin  •  methocarbamol  •  acetaminophen  •  ondansetron  •  senna-docusate **AND** polyethylene glycol/lytes **AND** magnesium hydroxide **AND** bisacodyl  •  LR  •  hydrALAZINE  •  gabapentin  •  Pharmacy Consult Request  •  oxyCODONE immediate-release **OR** oxyCODONE immediate-release **OR** morphine injection    Labs:  Recent Labs     22  0450 22  1406   WBC 9.5 8.8   RBC 3.49* 3.70*   HEMOGLOBIN 10.3* 11.0*   HEMATOCRIT 32.0* 33.9*   MCV 91.7 91.6   MCH 29.5 29.7   RDW 41.8 42.0   PLATELETCT 376 427   MPV 8.0* 8.2*     Recent Labs     22  0450 22  1406 22  0144   SODIUM 136 140  --    POTASSIUM 4.1 4.7  --    CHLORIDE 98 101  --    CO2 30 28  --    GLUCOSE 115* 100*  --    BUN 13 13  --    CPKTOTAL  --   --  53     Recent Labs     22  0450 22  1406   ALBUMIN 3.2 3.6   CREATININE 0.92 0.97       Imaging:  CT-ABDOMEN-PELVIS  WITH    Result Date: 4/15/2022  4/15/2022 6:48 PM HISTORY/REASON FOR EXAM:  Abdominal distention and pain.. TECHNIQUE/EXAM DESCRIPTION:   CT scan of the abdomen and pelvis with contrast. Contrast-enhanced helical scanning was obtained from the diaphragmatic domes through the pubic symphysis following the bolus administration of nonionic contrast without complication. 100 mL of Omnipaque 350 nonionic contrast was administered without complication. Low dose optimization technique was utilized for this CT exam including automated exposure control and adjustment of the mA and/or kV according to patient size. COMPARISON: No prior studies available. FINDINGS: Lower Chest: There is bibasilar atelectasis.. Liver: Fatty change. Spleen: Enlarged. Pancreas: Unremarkable. Gallbladder: No calcified stones. Biliary: Nondilated. Adrenal glands: Normal. Kidneys: Unremarkable without hydronephrosis. Bowel: No obstruction or acute inflammation. Lymph nodes: No adenopathy. Vasculature: Unremarkable. Musculoskeletal: There is stranding attenuation with a fluid collection located within the posterior right psoas muscle measuring 6.5 x 5 x 2.5 cm in size. This extends posterior and inferior from the psoas muscle also into the anterior aspect of the right iliacus muscle. There is some surrounding stranding in attenuation. There is asymmetric enlargement of the right psoas and iliac muscles. Pelvis: No adenopathy or free fluid. There is a Martin catheter within the urinary bladder. There is some ill-definition of the right SI joint with erosive change and sclerosis suggesting either inflammatory or infectious right sacroiliitis.     1.  Right posterior/inferior psoas muscle fluid collection which extends into the anterior/inferior iliacus muscle and measures 6.5 x 5 x 2.5 cm in size. This likely represents abscess versus hematoma. There is also asymmetric enlargement of the right psoas and iliac vessels consistent with myositis. 2.   Sclerosis and erosive change of the right SI joint. Consideration should be given for inflammatory or infectious sacroiliitis. 3.  Bibasilar atelectasis.    CT-DRAIN-RETROPERITONEAL    Result Date: 4/16/2022 4/16/2022 12:12 PM HISTORY/REASON FOR EXAM:  drainage of psoas abscess/hematoma. TECHNIQUE/EXAM DESCRIPTION: RLQ abscess drainage with CT guidance. Low dose optimization technique was utilized for this CT exam including automated exposure control and adjustment of the mA and/or kV according to patient size. PROCEDURE: Informed consent was obtained. Moderate sedation with Fentanyl and Versed was administered during the procedure with appropriate continuous patient monitoring by the radiology nurse. Intraservice duration: 30 minutes Localizing CT images were obtained with the patient in supine position. The skin was prepped with ChloraPrep and draped in a sterile fashion. Following local anesthesia with 1% Lidocaine, a 17 guiding needle was placed and needle path confirmed with CT. A guidewire was placed and following serial tract dilatation, a 10 Kazakh pigtail locking catheter was placed. A specimen was collected and submitted for culture and sensitivity and Gram stain. Total of 16 cc of bloody purulent fluid was drained. The catheter was secured to the skin and connected to suction bulb drainage. Final CT images were obtained documenting catheter position. The patient tolerated the procedure well with no evidence of complication. COMPARISON: None available. FINDINGS: The final CT images show satisfactory catheter position within the target collection.     1.  CT guided retroperitoneal right lower quadrant catheter drainage. 2.  The current plan is to obtain a follow-up CT scan in 5-7 days.    DX-LUMBAR SPINE-4+ VIEWS    Result Date: 4/5/2022  Radiographs of the lumbar spine demonstrate well-preserved disc spaces no evidence of fracture no spondylolisthesis.    MR-CERVICAL SPINE-WITH & W/O    Result Date:  4/17/2022 4/17/2022 3:55 PM HISTORY/REASON FOR EXAM:  Cervical radiculopathy, infection suspected. TECHNIQUE/EXAM DESCRIPTION: MRI of the cervical spine without and with contrast. The study was performed on a [employer] Signa 1.5 Sangeeta MRI scanner. T1 sagittal, T2 fast spin-echo sagittal, T1 postcontrast fat-suppressed sagittal, and gradient-echo axial images were obtained of the cervical spine. 14 mL ProHance contrast were administered  intravenously. COMPARISON:  None. FINDINGS: There is mild intervertebral disc spacer at C4-5, C5-6 and C6-7. Vertebral body height and alignment is normal. There is diffuse low marrow signal intensity throughout the imaged osseous structures. Nonspecific finding which can be seen in the setting of chronic disease, diffuse infiltrating processes such as multiple myeloma, blood dyscrasias and metastases. The cervical cord has a normal caliber, course and signal intensity. There is no evidence of abnormal enhancement in the cervical cord or spine. Level specific findings as follows: C2-C3: No significant central canal or neural foraminal stenosis. C3-C4: There is minimal broad posterior disc bulge and endplate spurring. There is mild right neural foraminal stenosis secondary to disc bulge, endplate spurring and facet arthropathy. C4-C5: There is minimal broad posterior disc bulge and endplate spurring. There is mild bilateral neural foraminal stenosis secondary to disc bulge, endplate spurring and facet arthropathy. C5-C6: There is mild broad posterior disc bulge and endplate spurring. There is mild ligamentum flavum hypertrophy. There is mild central canal stenosis. There is mild right and moderate left neural foraminal stenosis secondary to disc bulge, endplate spurring and facet arthropathy. C6-C7: There is mild broad posterior disc bulge and endplate spurring. There is moderately mentum flavum hypertrophy. There is mild central canal stenosis. There is mild right and moderate left  neural foraminal stenosis secondary to disc bulge, endplate spurring and facet arthropathy. C7-T1: No significant central canal or neural foraminal stenosis.     1.  No acute abnormality or abnormal enhancement in the cervical spine. 2.  Multilevel degenerative changes of the cervical spine as described above. 3.  Diffuse low marrow signal intensity throughout the imaged osseous structures. Nonspecific finding which can be seen in the setting of chronic disease, diffuse infiltrating processes such as multiple myeloma, blood dyscrasias and metastases.    MR-LUMBAR SPINE-WITH & W/O    Result Date: 4/17/2022 4/17/2022 3:55 PM HISTORY/REASON FOR EXAM:  Myelopathy, acute or progressive; Infection , bacteremia TECHNIQUE/EXAM DESCRIPTION: MRI of the lumbar spine without and with contrast. The study was performed on a [employer] 1.5 Sangeeta MRI scanner. T1 sagittal, T2 fast spin-echo sagittal, and T2 axial images were obtained of the lumbar spine. T1 postcontrast fat-suppressed sagittal images were obtained. 14 mL ProHance contrast was administered intravenously. COMPARISON:  None. FINDINGS: Alignment in the lumbar spine is normal. Marrow signal in the vertebral bodies is diffusely hypointense. There is T2 signal hyperintensity and associated enhancement involving the right sacral ala. Adjacent inflammatory change involving the adjacent iliopsoas and iliac is muscle are present. There is increased T2 fluid signal intensity in the right sacroiliac joint space. There are no significant osteophytic changes. The conus is normal in position and signal. There is no abnormal cord enhancement. At T12-L1 through L5-S1 disc height and signal are normal. At T12-L1 through L5-S1 there is no significant disc bulge or protrusion. There is no congenital or acquired central spinal stenosis at any lumbar level. The neural foramina are intact at all lumbar levels. There is no significant ligamentous or facet hypertrophy.     Findings are  suspicious for right sacral ala osteomyelitis. There is increased T2 fluid signal intensity in the right sacroiliac joint space also suspicious for septic arthritis. Attempts to convey these findings to Dr. DAMON LEVI were initiated on 4/17/2022 5:44 PM. Findings were discussed with Dr. Weston on 4/17/2022 at approximately 1800 hours.    MR-THORACIC SPINE-WITH & W/O    Result Date: 4/17/2022 4/17/2022 3:55 PM HISTORY/REASON FOR EXAM:  Myelopathy, acute or progressive; Looking for infection. TECHNIQUE/EXAM DESCRIPTION: MRI of the thoracic spine without and with contrast. The study was performed on a [employer] Signa 1.5 Sangeeta MRI scanner. T1 sagittal, T1 postcontrast fat-suppressed sagittal, T2 sagittal, and T2 axial images were obtained of the thoracic spine. 14 mL ProHance contrast were administered intravenously. COMPARISON:  None FINDINGS: There is diffusely low marrow signal intensity throughout the spine which is nonspecific. The thoracic vertebral bodies have a normal height and alignment. The thoracic disks have a normal appearance and signal intensity. The thoracic cord has a normal caliber, course and signal intensity. There is no evidence of abnormal enhancement in the thoracic cord or spine. There is no evidence of disk extrusion, cord compression, central canal stenosis, or neural foraminal stenosis. There is trace right pleural effusion.     1.  No evidence of acute abnormality or abnormal enhancement in the thoracic spine. 2.  Diffuse low marrow signal intensity throughout the imaged osseous structures. Nonspecific finding which can be seen in the setting of chronic disease, diffuse infiltrating processes such as multiple myeloma, blood dyscrasias and metastases.    EC-ECHOCARDIOGRAM COMPLETE W/O CONT    Result Date: 4/15/2022  Transthoracic Echo Report Echocardiography Laboratory CONCLUSIONS Normal transthoracic echocardiogram. RAUDEL CHEN Exam Date:         04/15/2022                    13:27  Exam Location:     Inpatient Priority:          Routine Ordering Physician:        ELEN ROSALES Referring Physician:       [id number] Benitez Sonographer:               Rosalinda Staton RDCS Age:    46     Gender:    M MRN:    [id number] :    1975 BSA:    1.94   Ht (in):    71     Wt (lb):    164 Exam Type:     Complete Indications:     Fever ICD Codes:       780.6 CPT Codes:       99609 BP:   109    /   72     HR:   80 Technical Quality:       Fair MEASUREMENTS  (Male / Female) Normal Values 2D ECHO LV Diastolic Diameter PLAX        4.4 cm                4.2 - 5.9 / 3.9 - 5.3 cm LV Systolic Diameter PLAX         2.7 cm                2.1 - 4.0 cm IVS Diastolic Thickness           0.85 cm               LVPW Diastolic Thickness          0.89 cm               LVOT Diameter                     2.1 cm                Estimated LV Ejection Fraction    48 %                  LV Ejection Fraction MOD BP       61.5 %                >= 55  % LV Ejection Fraction MOD 4C       66.4 %                LV Ejection Fraction MOD 2C       55.9 %                IVC Diameter                      1.6 cm                DOPPLER AV Peak Velocity                  1.4 m/s               AV Peak Gradient                  7.9 mmHg              AV Mean Gradient                  4.5 mmHg              LVOT Peak Velocity                1.1 m/s               AV Area Cont Eq vti               2.6 cm2               Mitral E Point Velocity           0.75 m/s              Mitral E to A Ratio               1.2                   MV Pressure Half Time             48.9 ms               MV Area PHT                       4.5 cm2               MV Deceleration Time              169 ms                TV Peak E Velocity                0.5 m/s               * Indicates values subject to auto-interpretation LV EF:  48    % FINDINGS Left Ventricle Normal left ventricular chamber size. Normal left ventricular wall thickness. Normal left ventricular systolic  function. The left ventricular ejection fraction is visually estimated to be 55%. Normal regional wall motion. Normal diastolic function. Right Ventricle The right ventricle is normal in size and systolic function. Right Atrium The right atrium is normal in size. Normal inferior vena cava size and inspiratory collapse. Left Atrium The left atrium is normal in size. Left atrial volume index is 16 mL/sq m. Mitral Valve Structurally normal mitral valve without significant stenosis or regurgitation. Aortic Valve Structurally normal aortic valve without significant stenosis or regurgitation. Tricuspid Valve Structurally normal tricuspid valve without significant stenosis or regurgitation. Unable to estimate right ventricular systolic pressure due to an inadequate tricuspid regurgitant jet. Pulmonic Valve No pulmonic stenosis. Trace pulmonic insufficiency. Pericardium Normal pericardium without effusion. Aorta Normal aortic root for body surface area. The ascending aorta diameter is 3.3 cm. Héctor Hancock MD (Electronically Signed) Final Date:     15 April 2022                 15:16    IR-PICC LINE PLACEMENT W/ GUIDANCE > AGE 5    Result Date: 4/19/2022  HISTORY/REASON FOR EXAM:   PICC placement. TECHNIQUE/EXAM DESCRIPTION AND NUMBER OF VIEWS:   PICC line insertion with ultrasound guidance.  The procedure was performed using maximal sterile barrier technique including sterile gown, mask, cap, and donning of sterile gloves following appropriate hand hygiene and/or sterile scrub. Patient skin site was prepped with 2% Chlorhexidine solution. FINDINGS:  PICC line insertion with Ultrasound Guidance was performed by qualified nursing staff without the assistance of a Radiologist. PICC positioning appropriateness confirmed by 3CG technology; chest xray only needed in the instance 3CG unable to confirm placement.              Ultrasound-guided PICC placement performed by qualified nursing staff as above.     DX-HIP-UNILATERAL-W  "PELVIS-3 VIEWS RIGHT    Result Date: 4/5/2022  Radiographs of the pelvis and the right hip were independently reviewed did not show any evidence of fracture no lesions are noted.  He does have bilateral cam lesions with mild to moderate bilateral arthritic changes..      Micro:  Results     Procedure Component Value Units Date/Time    Blood Culture [[id number]] Collected: 04/14/22 1846    Order Status: Completed Specimen: Blood from Peripheral Updated: 04/19/22 2100     Significant Indicator NEG     Source BLD     Site PERIPHERAL     Culture Result No growth after 5 days of incubation.    Narrative:      2 of 2 blood culture x2  Sites order. Per Hospital Policy:  Only change Specimen Src: to \"Line\" if specified by physician  order.  Right AC    CULTURE WOUND W/ GRAM STAIN [[id number]]  (Abnormal)  (Susceptibility) Collected: 04/16/22 1241    Order Status: Completed Specimen: Wound Updated: 04/18/22 1212     Significant Indicator POS     Source WND     Site Right Psoas Abscess     Culture Result -     Gram Stain Result Many WBCs.  Many Gram positive cocci.       Culture Result Staphylococcus aureus  Heavy growth  This isolate is presumed to be clindamycin resistant based on  detection of inducible resistance.      Narrative:      Collected By: [id number] JESSY CARMONA  Psoas abscess right  Aerobic and Anaerobic please  Collected By: [id number] JESSY CARMONA    Susceptibility     Staphylococcus aureus (1)     Antibiotic Interpretation Microscan   Method Status    Azithromycin Resistant >4 mcg/mL CLEMENTINA Final    Clindamycin Resistant <=0.25 mcg/mL CLEMENTINA Final    Cefazolin Sensitive <=8 mcg/mL CLEMENTINA Final    Cefepime Sensitive <=4 mcg/mL CLEMENTINA Final    Ceftaroline Sensitive <=0.5 mcg/mL CLEMENTINA Final    Daptomycin Sensitive <=0.5 mcg/mL CLEMENTINA Final    Erythromycin Resistant >4 mcg/mL CLEMENTINA Final    Ampicillin/sulbactam Sensitive <=8/4 mcg/mL CLEMENTINA Final    Vancomycin Sensitive 1 mcg/mL CLEMENTINA Final    Oxacillin Sensitive <=0.25 mcg/mL CLEMENTINA Final    " "Pip/Tazobactam Sensitive <=8 mcg/mL CLEMENTINA Final    Trimeth/Sulfa Sensitive <=0.5/9.5 mcg/mL CLEMENTINA Final    Tetracycline Sensitive <=4 mcg/mL CLEMENTINA Final                   Blood Culture [[id number]]  (Abnormal)  (Susceptibility) Collected: 04/14/22 1830    Order Status: Completed Specimen: Blood from Peripheral Updated: 04/17/22 1022     Significant Indicator POS     Source BLD     Site PERIPHERAL     Culture Result Growth detected by Bactec instrument. 04/15/2022  21:24  Staphylococcus aureus (methicillin sensitive)  detected by PCR.        Staphylococcus aureus  This isolate is presumed to be clindamycin resistant based on  detection of inducible resistance.      Narrative:      CALL  [facility]  131 tel. [phone],  CALLED  131 tel. [phone] 04/15/2022, 21:26, RB PERF. RESULTS CALLED TO:  RN: [id number]  1 of 2 for Blood Culture x 2 sites order. Per Hospital  Policy: Only change Specimen Src: to \"Line\" if specified by  physician order.  Left AC    Susceptibility     Staphylococcus aureus (1)     Antibiotic Interpretation Microscan   Method Status    Azithromycin Resistant >4 mcg/mL CLEMENTINA Final    Clindamycin Resistant <=0.25 mcg/mL CLEMENTINA Final    Cefazolin Sensitive <=8 mcg/mL CLEMENTINA Final    Cefepime Sensitive <=4 mcg/mL CLEMENTINA Final    Ceftaroline Sensitive <=0.5 mcg/mL CLEMENTINA Final    Daptomycin Sensitive 1 mcg/mL CLEMENTINA Final    Erythromycin Resistant >4 mcg/mL CLEMENTINA Final    Ampicillin/sulbactam Sensitive <=8/4 mcg/mL CLEMENTINA Final    Vancomycin Sensitive 1 mcg/mL CLEMENTINA Final    Oxacillin Sensitive <=0.25 mcg/mL CLEMENTINA Final    Pip/Tazobactam Sensitive <=8 mcg/mL CLEMENTINA Final    Trimeth/Sulfa Sensitive <=0.5/9.5 mcg/mL CLEMENTINA Final    Tetracycline Sensitive <=4 mcg/mL CLEMENTINA Final                   BLOOD CULTURE [[id number]] Collected: 04/16/22 1143    Order Status: Completed Specimen: Blood from Peripheral Updated: 04/17/22 0855     Significant Indicator NEG     Source BLD     Site PERIPHERAL     Culture Result No Growth  Note: Blood cultures are incubated " "for 5 days and  are monitored continuously.Positive blood cultures  are called to the RN and reported as soon as  they are identified.      Narrative:      Per Hospital Policy: Only change Specimen Src: to \"Line\" if  specified by physician order.  Right AC    BLOOD CULTURE [[id number]] Collected: 04/16/22 1143    Order Status: Completed Specimen: Blood from Peripheral Updated: 04/17/22 0855     Significant Indicator NEG     Source BLD     Site PERIPHERAL     Culture Result No Growth  Note: Blood cultures are incubated for 5 days and  are monitored continuously.Positive blood cultures  are called to the RN and reported as soon as  they are identified.      Narrative:      Per Hospital Policy: Only change Specimen Src: to \"Line\" if  specified by physician order.  Right Hand    URINE CULTURE(NEW) [[id number]] Collected: 04/14/22 1957    Order Status: Completed Specimen: Urine Updated: 04/16/22 2130     Significant Indicator NEG     Source UR     Site -     Culture Result Usual skin nadiya <10,000 cfu/mL    Narrative:      Indication for culture:->Patient WITHOUT an indwelling Martin  catheter in place with new onset of Dysuria, Frequency,  Urgency, and/or Suprapubic pain    GRAM STAIN [[id number]] Collected: 04/16/22 1241    Order Status: Completed Specimen: Wound Updated: 04/16/22 1855     Significant Indicator .     Source WND     Site Right Psoas Abscess     Gram Stain Result Many WBCs.  Many Gram positive cocci.      Narrative:      Collected By: [id number] JESSY CARMONA  Psoas abscess right  Aerobic and Anaerobic please  Collected By: [id number] JESSY CARMONA    FLUID CULTURE W/GRAM STAIN [[id number]] Collected: 04/16/22 1241    Order Status: Canceled Specimen: Other Body Fluid     MRSA By PCR (Amp) [[id number]] Collected: 04/15/22 0355    Order Status: Completed Specimen: Respirate from Nares Updated: 04/15/22 1842     MRSA by PCR Negative    Narrative:      Collected By: [id number] JAIMIE CARMONA  Per [facility] Lab Protocol    " URINALYSIS CULTURE, IF INDICATED [[id number]]  (Abnormal) Collected: 04/14/22 1957    Order Status: Completed Specimen: Urine Updated: 04/14/22 2046     Color Yellow     Character Clear     Specific Gravity 1.019     Ph 6.0     Glucose Negative mg/dL      Ketones Negative mg/dL      Protein Negative mg/dL      Bilirubin Negative     Urobilinogen, Urine 0.2     Nitrite Negative     Leukocyte Esterase Small     Occult Blood Moderate     Micro Urine Req Microscopic    Narrative:      Indication for culture:->Patient WITHOUT an indwelling Martin  catheter in place with new onset of Dysuria, Frequency,  Urgency, and/or Suprapubic pain    Urinalysis [[id number]] Collected: 04/14/22 0000    Order Status: Canceled Specimen: Urine           Assessment:  Active Hospital Problems    Diagnosis    • *Psoas abscess (HCC) [K68.12]    • Rash and nonspecific skin eruption [R21]    • Septic arthritis of sacroiliac joint (HCC) [M46.58]    • MSSA bacteremia [R78.81, B95.61]    • Sepsis (HCC) [A41.9]    • Leukocytosis [D72.829]    • Chronic pain syndrome [G89.4]    • Thrombocytosis [D75.839]    • Normocytic anemia [D64.9]      Interval 24 hours:      AF, O2 RA  Labs reviewed  Micro reviewed    Patient overall doing well.  Plan is for repeat CT on pelvis today and potential removal of the drain.  Continued on antibiotics as below.    Assessment:  46 y.o. male admitted 4/14/2022 with several weeks h/o pain right low back and hip.  Unclear etiology but did have suprapubic catheter placed due to ureteral obstruction approximately 2 months ago.  Denies any other for material or hardware in his body.     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   MSSA bacteremia  - BCx on 4/14 +MSSA  - BCx on 4/16 - NGTD  Ureteral stricture, plan is for eventual reconstruction, suprapubic catheter in place  -Urine culture negative, no sign of infection   Rash, new onset, improved after stopping  cefazolin  -Appearance consistent with drug rash, potentially due to cefazolin so transition to daptomycin      Plan:     ---  Stop cefazolin due to new rash and transition to daptomycin 8 mg/kg - will plan on a 6-week IV antibiotic course for bacteremia and osteomyelitis from first negative blood culture, (end 5/28/22)  --- Monitor CPK at least weekly, baseline of 53 on 4/20  --- Patient prefers home infusion if able to obtain insurance approval, orders for daptomycin were written on 4/19 and faxed to case management  --- Drain management per IR-potential removal depending on results of CT today  --- TTE unrevealing; recommend NAEEM if persistently positive blood cultures or new fevers     Discussed with internal medicine, Dr. Pinto.  ID will follow.

## 2022-04-20 NOTE — DISCHARGE PLANNING
Fax received from ID: IV medication changed to Daptomycin 8mg/1kg daily until 5/28/22. New order faxed to Sophia Crum RN at [facility] infusion services.

## 2022-04-21 ENCOUNTER — PHARMACY VISIT (OUTPATIENT)
Dept: PHARMACY | Facility: MEDICAL CENTER | Age: 47
End: 2022-04-21
Payer: COMMERCIAL

## 2022-04-21 VITALS
SYSTOLIC BLOOD PRESSURE: 111 MMHG | RESPIRATION RATE: 17 BRPM | OXYGEN SATURATION: 96 % | DIASTOLIC BLOOD PRESSURE: 87 MMHG | BODY MASS INDEX: 23.46 KG/M2 | HEIGHT: 71 IN | HEART RATE: 99 BPM | WEIGHT: 167.55 LBS | TEMPERATURE: 97.7 F

## 2022-04-21 LAB
BACTERIA BLD CULT: NORMAL
BACTERIA BLD CULT: NORMAL
SIGNIFICANT IND 70042: NORMAL
SIGNIFICANT IND 70042: NORMAL
SITE SITE: NORMAL
SITE SITE: NORMAL
SOURCE SOURCE: NORMAL
SOURCE SOURCE: NORMAL

## 2022-04-21 PROCEDURE — 99239 HOSP IP/OBS DSCHRG MGMT >30: CPT | Performed by: INTERNAL MEDICINE

## 2022-04-21 PROCEDURE — 700102 HCHG RX REV CODE 250 W/ 637 OVERRIDE(OP): Performed by: STUDENT IN AN ORGANIZED HEALTH CARE EDUCATION/TRAINING PROGRAM

## 2022-04-21 PROCEDURE — RXMED WILLOW AMBULATORY MEDICATION CHARGE: Performed by: INTERNAL MEDICINE

## 2022-04-21 PROCEDURE — A9270 NON-COVERED ITEM OR SERVICE: HCPCS | Performed by: STUDENT IN AN ORGANIZED HEALTH CARE EDUCATION/TRAINING PROGRAM

## 2022-04-21 RX ORDER — METHOCARBAMOL 500 MG/1
500 TABLET, FILM COATED ORAL 3 TIMES DAILY PRN
Qty: 120 TABLET | Refills: 0 | Status: SHIPPED | OUTPATIENT
Start: 2022-04-21

## 2022-04-21 RX ORDER — OXYCODONE HYDROCHLORIDE 5 MG/1
5 TABLET ORAL EVERY 6 HOURS PRN
Qty: 20 TABLET | Refills: 0 | Status: SHIPPED | OUTPATIENT
Start: 2022-04-21 | End: 2022-04-26

## 2022-04-21 RX ADMIN — OXYCODONE 5 MG: 5 TABLET ORAL at 11:23

## 2022-04-21 RX ADMIN — OXYCODONE 5 MG: 5 TABLET ORAL at 04:32

## 2022-04-21 RX ADMIN — GABAPENTIN 300 MG: 300 CAPSULE ORAL at 04:32

## 2022-04-21 RX ADMIN — SENNOSIDES AND DOCUSATE SODIUM 2 TABLET: 50; 8.6 TABLET ORAL at 04:32

## 2022-04-21 ASSESSMENT — ENCOUNTER SYMPTOMS
VOMITING: 0
COUGH: 0
FEVER: 0
WEAKNESS: 0
CHILLS: 0
BLURRED VISION: 0
ABDOMINAL PAIN: 0
SHORTNESS OF BREATH: 0
HEADACHES: 0
DIZZINESS: 0
FLANK PAIN: 1
MYALGIAS: 0
PALPITATIONS: 0
HEMOPTYSIS: 0
BRUISES/BLEEDS EASILY: 0

## 2022-04-21 ASSESSMENT — PAIN DESCRIPTION - PAIN TYPE
TYPE: ACUTE PAIN;SURGICAL PAIN
TYPE: ACUTE PAIN

## 2022-04-21 NOTE — CARE PLAN
Problem: Pain - Standard  Goal: Alleviation of pain or a reduction in pain to the patient’s comfort goal  Outcome: Progressing     Problem: Knowledge Deficit - Standard  Goal: Patient and family/care givers will demonstrate understanding of plan of care, disease process/condition, diagnostic tests and medications  Outcome: Progressing       The patient is Stable - Low risk of patient condition declining or worsening    Shift Goals  Clinical Goals: Pain control, flush BENJAMIN drain  Patient Goals: Pain control  Family Goals:     Progress made toward(s) clinical / shift goals:  Taught pt 0-10 pain scale and non-pharmacological method of pain management, encouraged to verbalize when in pain. Administered PRN pain meds as needed. BENJAMIN drain flushed per orders.    Patient is not progressing towards the following goals:

## 2022-04-21 NOTE — DISCHARGE INSTRUCTIONS
Discharge Instructions    Discharged to home by car with relative. Discharged via wheelchair, hospital escort: Yes.  Special equipment needed: Not Applicable    Be sure to schedule a follow-up appointment with your primary care doctor or any specialists as instructed.       Discharge Plan:        I understand that a diet low in cholesterol, fat, and sodium is recommended for good health. Unless I have been given specific instructions below for another diet, I accept this instruction as my diet prescription.   Other diet: Regular diet    Special Instructions: None    · Is patient discharged on Warfarin / Coumadin?   No   Methocarbamol tablets  What is this medicine?  METHOCARBAMOL (meth oh RUBEN ba mole) helps to relieve pain and stiffness in muscles caused by strains, sprains, or other injury to your muscles.  This medicine may be used for other purposes; ask your health care provider or pharmacist if you have questions.  COMMON BRAND NAME(S): Robaxin  What should I tell my health care provider before I take this medicine?  They need to know if you have any of these conditions:  · kidney disease  · seizures  · an unusual or allergic reaction to methocarbamol, other medicines, foods, dyes, or preservatives  · pregnant or trying to get pregnant  · breast-feeding  How should I use this medicine?  Take this medicine by mouth with a full glass of water. Follow the directions on the prescription label. Take your medicine at regular intervals. Do not take your medicine more often than directed.  Talk to your pediatrician regarding the use of this medicine in children. Special care may be needed.  Overdosage: If you think you have taken too much of this medicine contact a poison control center or emergency room at once.  NOTE: This medicine is only for you. Do not share this medicine with others.  What if I miss a dose?  If you miss a dose, take it as soon as you can. If it is almost time for your next dose, take only the  next dose. Do not take double or extra doses.  What may interact with this medicine?  Do not take this medication with any of the following medicines:  · narcotic medicines for cough  This medicine may also interact with the following medications:  · alcohol  · antihistamines for allergy, cough and cold  · certain medicines for anxiety or sleep  · certain medicines for depression like amitriptyline, fluoxetine, sertraline  · certain medicines for seizures like phenobarbital, primidone  · cholinesterase inhibitors like neostigmine, ambenonium, and pyridostigmine bromide  · general anesthetics like halothane, isoflurane, methoxyflurane, propofol  · local anesthetics like lidocaine, pramoxine, tetracaine  · medicines that relax muscles for surgery  · narcotic medicines for pain  · phenothiazines like chlorpromazine, mesoridazine, prochlorperazine, thioridazine  This list may not describe all possible interactions. Give your health care provider a list of all the medicines, herbs, non-prescription drugs, or dietary supplements you use. Also tell them if you smoke, drink alcohol, or use illegal drugs. Some items may interact with your medicine.  What should I watch for while using this medicine?  Tell your doctor or health care professional if your symptoms do not start to get better or if they get worse.  You may get drowsy or dizzy. Do not drive, use machinery, or do anything that needs mental alertness until you know how this medicine affects you. Do not stand or sit up quickly, especially if you are an older patient. This reduces the risk of dizzy or fainting spells. Alcohol may interfere with the effect of this medicine. Avoid alcoholic drinks.  If you are taking another medicine that also causes drowsiness, you may have more side effects. Give your health care provider a list of all medicines you use. Your doctor will tell you how much medicine to take. Do not take more medicine than directed. Call emergency for  help if you have problems breathing or unusual sleepiness.  What side effects may I notice from receiving this medicine?  Side effects that you should report to your doctor or health care professional as soon as possible:  · allergic reactions like skin rash, itching or hives, swelling of the face, lips, or tongue  · breathing problems  · confusion  · seizures  · unusually weak or tired  Side effects that usually do not require medical attention (report to your doctor or health care professional if they continue or are bothersome):  · dizziness  · headache  · metallic taste  · tiredness  · upset stomach  This list may not describe all possible side effects. Call your doctor for medical advice about side effects. You may report side effects to FDA at 1-287-IQB-0090.  Where should I keep my medicine?  Keep out of the reach of children.  Store at room temperature between 20 and 25 degrees C (68 and 77 degrees F). Keep container tightly closed. Throw away any unused medicine after the expiration date.  NOTE: This sheet is a summary. It may not cover all possible information. If you have questions about this medicine, talk to your doctor, pharmacist, or health care provider.  © 2020 [employer] (2016-09-27 13:11:54)  Daptomycin injection  What is this medicine?  DAPTOMYCIN (DAP toe MYE sin) is a lipopeptide antibiotic. It is used to treat certain kinds of bacterial infections. It will not work for colds, flu, or other viral infections.  This medicine may be used for other purposes; ask your health care provider or pharmacist if you have questions.  COMMON BRAND NAME(S): Rodrigo Wallace  What should I tell my health care provider before I take this medicine?  They need to know if you have any of these conditions:  · kidney disease  · an unusual or allergic reaction to daptomycin, other medicines, foods, dyes, or preservatives  · pregnant or trying to get pregnant  · breast-feeding  How should I use this  medicine?  This medicine is for infusion into a vein. It is usually given by a health care professional in a hospital or clinic setting.  If you get this medicine at home, you will be taught how to prepare and give this medicine. Use exactly as directed. Take your medicine at regular intervals. Do not take your medicine more often than directed. Take all of your medicine as directed even if you think you are better. Do not skip doses or stop your medicine early.  It is important that you put your used needles and syringes in a special sharps container. Do not put them in a trash can. If you do not have a sharps container, call your pharmacist or healthcare provider to get one.  Talk to your pediatrician regarding the use of this medicine in children. While this drug may be prescribed for children as young as 1 year for selected conditions, precautions do apply.  Overdosage: If you think you have taken too much of this medicine contact a poison control center or emergency room at once.  NOTE: This medicine is only for you. Do not share this medicine with others.  What if I miss a dose?  If you miss a dose, take it as soon as you can. If it is almost time for your next dose, take only that dose. Do not take double or extra doses.  What may interact with this medicine?  · birth control pills  · some antibiotics like tobramycin  This list may not describe all possible interactions. Give your health care provider a list of all the medicines, herbs, non-prescription drugs, or dietary supplements you use. Also tell them if you smoke, drink alcohol, or use illegal drugs. Some items may interact with your medicine.  What should I watch for while using this medicine?  Your condition will be monitored carefully while you are receiving this medicine.  Do not treat diarrhea with over the counter products. Contact your doctor if you have diarrhea that lasts more than 2 days or if it is severe and watery.  What side effects may I  notice from receiving this medicine?  Side effects that you should report to your doctor or health care professional as soon as possible:  · allergic reactions like skin rash, itching or hives, swelling of the face, lips, or tongue  · breathing problems  · fever, infection  · high or low blood pressure  · muscle pain  · numb or tingling pain  · trouble passing urine or change in the amount of urine  · unusually tired or weak  · vomiting  Side effects that usually do not require medical attention (report to your doctor or health care professional if they continue or are bothersome):  · constipation or diarrhea  · trouble sleeping  · headache  · nausea  · stomach upset  This list may not describe all possible side effects. Call your doctor for medical advice about side effects. You may report side effects to FDA at 5-660-PAA-0304.  Where should I keep my medicine?  Keep out of the reach of children.  If you are using this medicine at home, you will be instructed on how to store this medicine. Throw away any unused medicine after the expiration date on the label.  NOTE: This sheet is a summary. It may not cover all possible information. If you have questions about this medicine, talk to your doctor, pharmacist, or health care provider.  © 2020 [employer] Standard (2017-03-31 11:21:16)    Oxycodone tablets or capsules  What is this medicine?  OXYCODONE (ox i KOE done) is a pain reliever. It is used to treat moderate to severe pain.  This medicine may be used for other purposes; ask your health care provider or pharmacist if you have questions.  COMMON BRAND NAME(S): Dazidox, Endocodone, Oxaydo, OXECTA, OxyIR, Percolone, Roxicodone, Roxybond  What should I tell my health care provider before I take this medicine?  They need to know if you have any of these conditions:  · Divide's disease  · brain tumor  · head injury  · heart disease  · history of drug or alcohol abuse problem  · if you often drink alcohol  · kidney  disease  · liver disease  · lung or breathing disease, like asthma  · mental illness  · pancreatic disease  · seizures  · thyroid disease  · an unusual or allergic reaction to oxycodone, codeine, hydrocodone, morphine, other medicines, foods, dyes, or preservatives  · pregnant or trying to get pregnant  · breast-feeding  How should I use this medicine?  Take this medicine by mouth with a glass of water. Follow the directions on the prescription label. You can take it with or without food. If it upsets your stomach, take it with food. Take your medicine at regular intervals. Do not take it more often than directed. Do not stop taking except on your doctor's advice.  Some brands of this medicine, like Oxecta, have special instructions. Ask your doctor or pharmacist if these directions are for you: Do not cut, crush or chew this medicine. Swallow only one tablet at a time. Do not wet, soak, or lick the tablet before you take it.  A special MedGuide will be given to you by the pharmacist with each prescription and refill. Be sure to read this information carefully each time.  Talk to your pediatrician regarding the use of this medicine in children. Special care may be needed.  Overdosage: If you think you have taken too much of this medicine contact a poison control center or emergency room at once.  NOTE: This medicine is only for you. Do not share this medicine with others.  What if I miss a dose?  If you miss a dose, take it as soon as you can. If it is almost time for your next dose, take only that dose. Do not take double or extra doses.  What may interact with this medicine?  This medicine may interact with the following medications:  · alcohol  · antihistamines for allergy, cough and cold  · antiviral medicines for HIV or AIDS  · atropine  · certain antibiotics like clarithromycin, erythromycin, linezolid, rifampin  · certain medicines for anxiety or sleep  · certain medicines for bladder problems like  oxybutynin, tolterodine  · certain medicines for depression like amitriptyline, fluoxetine, sertraline  · certain medicines for fungal infections like ketoconazole, itraconazole, voriconazole  · certain medicines for migraine headache like almotriptan, eletriptan, frovatriptan, naratriptan, rizatriptan, sumatriptan, zolmitriptan  · certain medicines for nausea or vomiting like dolasetron, ondansetron, palonosetron  · certain medicines for Parkinson's disease like benztropine, trihexyphenidyl  · certain medicines for seizures like phenobarbital, phenytoin, primidone  · certain medicines for stomach problems like dicyclomine, hyoscyamine  · certain medicines for travel sickness like scopolamine  · diuretics  · general anesthetics like halothane, isoflurane, methoxyflurane, propofol  · ipratropium  · local anesthetics like lidocaine, pramoxine, tetracaine  · MAOIs like Carbex, Eldepryl, Marplan, Nardil, and Parnate  · medicines that relax muscles for surgery  · methylene blue  · nilotinib  · other narcotic medicines for pain or cough  · phenothiazines like chlorpromazine, mesoridazine, prochlorperazine, thioridazine  This list may not describe all possible interactions. Give your health care provider a list of all the medicines, herbs, non-prescription drugs, or dietary supplements you use. Also tell them if you smoke, drink alcohol, or use illegal drugs. Some items may interact with your medicine.  What should I watch for while using this medicine?  Tell your doctor or health care professional if your pain does not go away, if it gets worse, or if you have new or a different type of pain. You may develop tolerance to the medicine. Tolerance means that you will need a higher dose of the medicine for pain relief. Tolerance is normal and is expected if you take this medicine for a long time.  Do not suddenly stop taking your medicine because you may develop a severe reaction. Your body becomes used to the medicine. This  does NOT mean you are addicted. Addiction is a behavior related to getting and using a drug for a non-medical reason. If you have pain, you have a medical reason to take pain medicine. Your doctor will tell you how much medicine to take. If your doctor wants you to stop the medicine, the dose will be slowly lowered over time to avoid any side effects.  There are different types of narcotic medicines (opiates). If you take more than one type at the same time or if you are taking another medicine that also causes drowsiness, you may have more side effects. Give your health care provider a list of all medicines you use. Your doctor will tell you how much medicine to take. Do not take more medicine than directed. Call emergency for help if you have problems breathing or unusual sleepiness.  You may get drowsy or dizzy. Do not drive, use machinery, or do anything that needs mental alertness until you know how the medicine affects you. Do not stand or sit up quickly, especially if you are an older patient. This reduces the risk of dizzy or fainting spells. Alcohol may interfere with the effect of this medicine. Avoid alcoholic drinks.  This medicine will cause constipation. Try to have a bowel movement at least every 2 to 3 days. If you do not have a bowel movement for 3 days, call your doctor or health care professional.  Your mouth may get dry. Chewing sugarless gum or sucking hard candy, and drinking plenty of water may help. Contact your doctor if the problem does not go away or is severe.  What side effects may I notice from receiving this medicine?  Side effects that you should report to your doctor or health care professional as soon as possible:  · allergic reactions like skin rash, itching or hives, swelling of the face, lips, or tongue  · breathing problems  · confusion  · signs and symptoms of low blood pressure like dizziness; feeling faint or lightheaded, falls; unusually weak or tired  · trouble passing  urine or change in the amount of urine  · trouble swallowing  Side effects that usually do not require medical attention (report to your doctor or health care professional if they continue or are bothersome):  · constipation  · dry mouth  · nausea, vomiting  · tiredness  This list may not describe all possible side effects. Call your doctor for medical advice about side effects. You may report side effects to FDA at 9-713-BUX-1093.  Where should I keep my medicine?  Keep out of the reach of children. This medicine can be abused. Keep your medicine in a safe place to protect it from theft. Do not share this medicine with anyone. Selling or giving away this medicine is dangerous and against the law.  Store at room temperature between 15 and 30 degrees C (59 and 86 degrees F). Protect from light. Keep container tightly closed.  This medicine may cause harm and death if it is taken by other adults, children, or pets. Return medicine that has not been used to an official disposal site. Contact the [facility] at [phone] or your [facility] government to find a site. If you cannot return the medicine, flush it down the toilet. Do not use the medicine after the expiration date.  NOTE: This sheet is a summary. It may not cover all possible information. If you have questions about this medicine, talk to your doctor, pharmacist, or health care provider.  © 2020 [employer] (2018-04-24 16:13:10)    PICC Home Care Guide    A peripherally inserted central catheter (PICC) is a form of IV access that allows medicines and IV fluids to be quickly distributed throughout the body. The PICC is a long, thin, flexible tube (catheter) that is inserted into a vein in the upper arm. The catheter ends in a large vein in the chest (superior vena cava, or SVC). After the PICC is inserted, a chest X-ray may be done to make sure that it is in the correct place.  A PICC may be placed for different reasons, such as:  · To give medicines  and liquid nutrition.  · To give IV fluids and blood products.  · If there is trouble placing a peripheral intravenous (PIV) catheter.  If taken care of properly, a PICC can remain in place for several months. Having a PICC can also allow a person to go home from the hospital sooner. Medicine and PICC care can be managed at home by a family member, caregiver, or home health care team.  What are the risks?  Generally, having a PICC is safe. However, problems may occur, including:  · A blood clot (thrombus) forming in or at the tip of the PICC.  · A blood clot forming in a vein (deep vein thrombosis) or traveling to the lung (pulmonary embolism).  · Inflammation of the vein (phlebitis) in which the PICC is placed.  · Infection. Central line associated blood stream infection (CLABSI) is a serious infection that often requires hospitalization.  · PICC movement (malposition). The PICC tip may move from its original position due to excessive physical activity, forceful coughing, sneezing, or vomiting.  · A break or cut in the PICC. It is important not to use scissors near the PICC.  · Nerve or tendon irritation or injury during PICC insertion.  How to take care of your PICC  Preventing problems  · You and any caregivers should wash your hands often with soap. Wash hands:  ? Before touching the PICC line or the infusion device.  ? Before changing a bandage (dressing).  · Flush the PICC as told by your health care provider. Let your health care provider know right away if the PICC is hard to flush or does not flush. Do not use force to flush the PICC.  · Do not use a syringe that is less than 10 mL to flush the PICC.  · Avoid blood pressure checks on the arm in which the PICC is placed.  · Never pull or tug on the PICC.  · Do not take the PICC out yourself. Only a trained clinical professional should remove the PICC.  · Use clean and sterile supplies only. Keep the supplies in a dry place. Do not reuse needles, syringes, or  any other supplies. Doing that can lead to infection.  · Keep pets and children away from your PICC line.  · Check the PICC insertion site every day for signs of infection. Check for:  ? Leakage.  ? Redness, swelling, or pain.  ? Fluid or blood.  ? Warmth.  ? Pus or a bad smell.  PICC dressing care  · Keep your PICC bandage (dressing) clean and dry to prevent infection.  · Do not take baths, swim, or use a hot tub until your health care provider approves. Ask your health care provider if you can take showers. You may only be allowed to take sponge baths for bathing. When you are allowed to shower:  ? Ask your health care provider to teach you how to wrap the PICC line.  ? Cover the PICC line with clear plastic wrap and tape to keep it dry while showering.  · Follow instructions from your health care provider about how to take care of your insertion site and dressing. Make sure you:  ? Wash your hands with soap and water before you change your bandage (dressing). If soap and water are not available, use hand .  ? Change your dressing as told by your health care provider.  ? Leave stitches (sutures), skin glue, or adhesive strips in place. These skin closures may need to stay in place for 2 weeks or longer. If adhesive strip edges start to loosen and curl up, you may trim the loose edges. Do not remove adhesive strips completely unless your health care provider tells you to do that.  · Change your PICC dressing if it becomes loose or wet.  General instructions    · Carry your PICC identification card or wear a medical alert bracelet at all times.  · Keep the tube clamped at all times, unless it is being used.  · Carry a smooth-edge clamp with you at all times to place on the tube if it breaks.  · Do not use scissors or sharp objects near the tube.  · You may bend your arm and move it freely. If your PICC is near or at the bend of your elbow, avoid activity with repeated motion at the elbow.  · Avoid lifting  "heavy objects as told by your health care provider.  · Keep all follow-up visits as told by your health care provider. This is important.  Disposal of supplies  · Throw away any syringes in a disposal container that is meant for sharp items (sharps container). You can buy a sharps container from a pharmacy, or you can make one by using an empty hard plastic bottle with a cover.  · Place any used dressings or infusion bags into a plastic bag. Throw that bag in the trash.  Contact a health care provider if:  · You have pain in your arm, ear, face, or teeth.  · You have a fever or chills.  · You have redness, swelling, or pain around the insertion site.  · You have fluid or blood coming from the insertion site.  · Your insertion site feels warm to the touch.  · You have pus or a bad smell coming from the insertion site.  · Your skin feels hard and raised around the insertion site.  Get help right away if:  · Your PICC is accidentally pulled all the way out. If this happens, cover the insertion site with a bandage or gauze dressing. Do not throw the PICC away. Your health care provider will need to check it.  · Your PICC was tugged or pulled and has partially come out. Do not  push the PICC back in.  · You cannot flush the PICC, it is hard to flush, or the PICC leaks around the insertion site when it is flushed.  · You hear a \"flushing\" sound when the PICC is flushed.  · You feel your heart racing or skipping beats.  · There is a hole or tear in the PICC.  · You have swelling in the arm in which the PICC was inserted.  · You have a red streak going up your arm from where the PICC was inserted.  Summary  · A peripherally inserted central catheter (PICC) is a long, thin, flexible tube (catheter) that is inserted into a vein in the upper arm.  · The PICC is inserted using a sterile technique by a specially trained nurse or physician. Only a trained clinical professional should remove it.  · Keep your PICC identification " card with you at all times.  · Avoid blood pressure checks on the arm in which the PICC is placed.  · If cared for properly, a PICC can remain in place for several months. Having a PICC can also allow a person to go home from the hospital sooner.  This information is not intended to replace advice given to you by your health care provider. Make sure you discuss any questions you have with your health care provider.  Document Released: 06/23/2004 Document Revised: 11/30/2018 Document Reviewed: 01/20/2018  [employer] Patient Education © 2020 [employer]    Abscess  Care After  An abscess (also called a boil or furuncle) is an infected area that contains a collection of pus. Signs and symptoms of an abscess include pain, tenderness, redness, or hardness, or you may feel a moveable soft area under your skin. An abscess can occur anywhere in the body. The infection may spread to surrounding tissues causing cellulitis. A cut (incision) by the surgeon was made over your abscess and the pus was drained out. Gauze may have been packed into the space to provide a drain that will allow the cavity to heal from the inside outwards. The boil may be painful for 5 to 7 days. Most people with a boil do not have high fevers. Your abscess, if seen early, may not have localized, and may not have been lanced. If not, another appointment may be required for this if it does not get better on its own or with medications.  HOME CARE INSTRUCTIONS   · Only take over-the-counter or prescription medicines for pain, discomfort, or fever as directed by your caregiver.  · When you bathe, soak and then remove gauze or iodoform packs at least daily or as directed by your caregiver. You may then wash the wound gently with mild soapy water. Repack with gauze or do as your caregiver directs.  SEEK IMMEDIATE MEDICAL CARE IF:   · You develop increased pain, swelling, redness, drainage, or bleeding in the wound site.  · You develop signs of generalized  infection including muscle aches, chills, fever, or a general ill feeling.  · An oral temperature above 102° F (38.9° C) develops, not controlled by medication.  See your caregiver for a recheck if you develop any of the symptoms described above. If medications (antibiotics) were prescribed, take them as directed.  Document Released: 07/06/2006 Document Revised: 03/11/2013 Document Reviewed: 03/02/2009  ExitCare® Patient Information ©2014 [employer].      Depression / Suicide Risk    As you are discharged from this [facility] facility, it is important to learn how to keep safe from harming yourself.    Recognize the warning signs:  · Abrupt changes in personality, positive or negative- including increase in energy   · Giving away possessions  · Change in eating patterns- significant weight changes-  positive or negative  · Change in sleeping patterns- unable to sleep or sleeping all the time   · Unwillingness or inability to communicate  · Depression  · Unusual sadness, discouragement and loneliness  · Talk of wanting to die  · Neglect of personal appearance   · Rebelliousness- reckless behavior  · Withdrawal from people/activities they love  · Confusion- inability to concentrate     If you or a loved one observes any of these behaviors or has concerns about self-harm, here's what you can do:  · Talk about it- your feelings and reasons for harming yourself  · Remove any means that you might use to hurt yourself (examples: pills, rope, extension cords, firearm)  · Get professional help from the community (Mental Health, Substance Abuse, psychological counseling)  · Do not be alone:Call your Safe Contact- someone whom you trust who will be there for you.  · Call your local CRISIS HOTLINE 218-0629 or 285-686-8729  · Call your local Children's Mobile Crisis Response Team [place] (121) 855-8256 or www.OneTouchEMR  · Call the toll free National Suicide Prevention Hotlines   · National Suicide Prevention  Lifeline 654-113-BLVF (2034)  · National [place] Line Network 800-SUICIDE (383-6552)

## 2022-04-21 NOTE — DISCHARGE SUMMARY
Discharge Summary    CHIEF COMPLAINT ON ADMISSION  Chief Complaint   Patient presents with   • Sent by MD Dr. Eb Alcazar from [facility] sent patient over to rule out infection r/t large hematoma of psoas muscle   • Hip Pain     Hip, lower back, and buttocks pain since march. Seen by many specialists.        Reason for Admission  Fever hip pain buttocks pain     Admission Date  4/14/2022    CODE STATUS  Full Code    HPI & HOSPITAL COURSE    This is a 46 year-old male with a past medical significant for chronic sciatica on the right side went to chiropractor with hope that his pain will get relieved.  When he went to chiropractor with some manipulation he was noted to have severe pain to the extent that he is unable to move.     Patient went to spine surgery and Dr Romero recommended following up with Dr. Alcazar.  He had an MRI on 4/7/2022 showing significant fluid within the psoas and iliac is muscle bellies as well as posteriorly near the piriformis concerning for hematoma or abscess.  This patient was sent to ER by orthopedic surgery.     Also reports associated with fever is 102 with the last 4 days.  Upon presentation in ER, is noted to be tachycardic with heart rate of 112, WBC 16.9, lactic acid 2.1, CRP 14.1, patient had a suprapubic catheter, which will be changed.     Blood Cx X 2 on 4/14 grew MSSA.  Repeat blood culture in 4/166/2022 no growth to date, will obtain PICC line. Paatient underwent IR guided drainage on 4/16/2022, culture growing MSSA.  Drain is managed by IR. will need IV antibiotics till 5/29/2022.  While patient was in IV antibiotics, will need CBC, CMP, ESR, CRP every week.     ID consulted, patient antibiotics was changed to Ancef. However pt did developed a rash, so he therefore antibiotic changed to daptomycin. As perrecommendation MRI of the cervical spine thoracic and lumbar spine has been ordered obtained, noted to have septic arthritis of sacroiliac joint.  I discussed with Dr. Romero we  stated no intervention indicated at this time. Repeat CTAP 4/20- decrease size of abscess.  He recommended continuing IV antibiotics.  Pt reports some recurrent infection and also family history with similar infection. He has also splenomegaly as incidental finding on CT scan 4/20/2022. Referral to follow for immunology and hematology as OP.      Pt was cleared by therapy team. Home health, walker arranged.     Therefore, he is discharged in good and stable condition to home with close outpatient follow-up.    The patient met 2-midnight criteria for an inpatient stay at the time of discharge.    Discharge Date  04/22/2022    FOLLOW UP ITEMS POST DISCHARGE  Follow up with infectious disease, immunology and hematology/oncology     DISCHARGE DIAGNOSES  Principal Problem:    Psoas abscess (HCC) POA: Yes  Active Problems:    Sepsis (HCC) POA: Unknown    Leukocytosis POA: Yes    Chronic pain syndrome POA: Yes    Thrombocytosis POA: Yes    Normocytic anemia POA: Yes    MSSA bacteremia POA: Yes    Septic arthritis of sacroiliac joint (HCC) POA: Yes    Rash and nonspecific skin eruption POA: Yes  Resolved Problems:    Hyperglycemia POA: Unknown      FOLLOW UP  No future appointments.  Lisa English M.D.  [address] [place] [id number]  [phone]      As needed      MEDICATIONS ON DISCHARGE     Medication List      START taking these medications      Instructions   methocarbamol 500 MG Tabs  Commonly known as: ROBAXIN   Take 1 Tablet by mouth 3 times a day as needed.  Dose: 500 mg     oxyCODONE immediate-release 5 MG Tabs  Commonly known as: ROXICODONE   Take 1 Tablet by mouth every 6 hours as needed for Severe Pain for up to 5 days.  Dose: 5 mg        CHANGE how you take these medications      Instructions   gabapentin 300 MG Caps  What changed: additional instructions  Commonly known as: NEURONTIN   Take 1 Capsule by mouth 3 times a day. Start with 300mg PO nightly, Day 2 start 300mg PO BID, Day 7  start 300mg PO TID, then increase each dose (one at a time) every 5 days by 300mg to 900mg TID maximum  Dose: 300 mg        CONTINUE taking these medications      Instructions   acetaminophen 500 MG Tabs  Commonly known as: TYLENOL   Take 1,000 mg by mouth 2 times daily with meals as needed. Indications: Pain  Dose: 1,000 mg        STOP taking these medications    meloxicam 15 MG tablet  Commonly known as: MOBIC            Allergies  No Known Allergies    DIET  Orders Placed This Encounter   Procedures   • Diet Order Diet: Regular     Standing Status:   Standing     Number of Occurrences:   1     Order Specific Question:   Diet:     Answer:   Regular [1]       ACTIVITY  As tolerated.  Weight bearing as tolerated    CONSULTATIONS  Ortho  IR  Infectious disease     PROCEDURES  CT guided drain placement. 4/16/2022    LABORATORY  Lab Results   Component Value Date    SODIUM 140 04/19/2022    POTASSIUM 4.7 04/19/2022    CHLORIDE 101 04/19/2022    CO2 28 04/19/2022    GLUCOSE 100 (H) 04/19/2022    BUN 13 04/19/2022    CREATININE 0.97 04/19/2022        Lab Results   Component Value Date    WBC 8.8 04/19/2022    HEMOGLOBIN 11.0 (L) 04/19/2022    HEMATOCRIT 33.9 (L) 04/19/2022    PLATELETCT 427 04/19/2022        Total time of the discharge process exceeds 35 minutes.

## 2022-04-21 NOTE — CARE PLAN
The patient is Stable - Low risk of patient condition declining or worsening    Shift Goals  Clinical Goals: Pain management  Patient Goals: Pain management, rest  Family Goals: Over all care mgt    Progress made toward(s) clinical / shift goals:    Problem: Pain - Standard  Goal: Alleviation of pain or a reduction in pain to the patient’s comfort goal  4/21/2022 1523 by Mercedes Heller, Student  Note: PRN pain medications administered per MAR, provided PRN pain medications post drain removal, 0-10 pain scale used to assess pain  4/21/2022 1522 by Mercedes Heller, Student  Outcome: Progressing     Problem: Knowledge Deficit - Standard  Goal: Patient and family/care givers will demonstrate understanding of plan of care, disease process/condition, diagnostic tests and medications  4/21/2022 1523 by Mercedes Heller, Student  Note: Pt educated on plan of discharge   4/21/2022 1522 by Mercedes Heller, Student  Outcome: Progressing       Patient is not progressing towards the following goals: N/A

## 2022-04-21 NOTE — DISCHARGE PLANNING
Received Choice form at 0941  Agency/Facility Name: [facility]  Referral not sent per Choice form, pending DME order.    BELLA Davila informed.

## 2022-04-21 NOTE — DISCHARGE PLANNING
Received Choice form at 0941  Agency/Facility Name: [facility] (through community management tab)  Referral sent per Choice form @ 5801    CM Lola informed.

## 2022-04-21 NOTE — FACE TO FACE
Face to Face Note  -  Durable Medical Equipment    Sandy Pinto M.D. - NPI: [id number]  I certify that this patient is under my care and that they have had a durable medical equipment(DME)face to face encounter by myself that meets the physician DME face-to-face encounter requirements with this patient on:    Date of encounter:   Patient:                    MRN:                       YOB: 2024  Gary Ramon  [id number]  1975     The encounter with the patient was in whole, or in part, for the following medical condition, which is the primary reason for durable medical equipment:  Other - Psoas abscess infection    I certify that, based on my findings, the following durable medical equipment is medically necessary:  Walkers and 3 in 1 Bedside [place].    HOME O2 Saturation Measurements:(Values must be present for Home Oxygen orders)         ,     ,         My Clinical findings support the need for the above equipment due to:  Other - Wound infection, cellulitis abscess infection,    Supporting Symptoms: IV antibiotic,      ------------------------------------------------------------------------------------------------------------------    Face to Face Supporting Documentation - Home Health    The encounter with this patient was in whole or in part the primary reason for home health admission.    Date of encounter:   Patient:                    MRN:                       YOB: 2024  Gary Ramon  [id number]  1975     Home health to see patient for:  Skilled Nursing care for assessment, interventions & education, Wound Care, Home health aide, Physical Therapy evaluation and treatment, Occupational therapy evaluation and treatment and Comment: IV antibiotics    Skilled need for:  Surgical Aftercare Psoas abscess surgical, New Onset Medical Diagnosis Psoas abscess, Medication Management IV antibiotics and Comment: IV antibiotics, PT/OT    Skilled nursing interventions  to include:  Home IV infusion therapy, Wound Care and Comment: PT/OT    Homebound evidenced status by:  Need the aid of supportive devices such as crutches, canes, wheelchairs or walkers, Require the use of special transportation or Needs the assistance of another person in order to leave the home. Leaving home must require a considerable and taxing effort. There must exist a normal inability to leave the home.    Community Physician to provide follow up care: Pcp Pt States None     Optional Interventions    Wound information & treatment:    Home Infusion Therapy orders:    Line/Drain/Airway:    I certify the face to face encounter for this home care referral meets the CMS requirements and the encounter/clinical assessment with the patient was, in whole, or in part, for the medical condition(s) listed above, which is the primary reason for home health care. Based on my clinical findings: the service(s) are medically necessary, support the need for home health care, and the homebound criteria are met.  I certify that this patient has had a face to face encounter by myself.  Sandy Pinto M.D. - NPI: [id number]    *Debility, frailty and advanced age in the absence of an acute deterioration or exacerbation of a condition do not qualify a patient for home health.

## 2022-04-21 NOTE — DISCHARGE PLANNING
Left voice message for [facility] BELLA Sagastume.   Spoke to patients girlfriend Alejandra, she advised that she spoke to the insurance plan and they have confirmed patient is covered on there, [place] ins ID: [id number].   Patient confirmed his ins coverage, patient emailed insurance card to this CM. CM faxed patients insurance card to Sophia at [facility] and emailed to [facility].   Sophia will be here at 4pm today for teaching and are still awaiting authorization from [facility].   Choices for FWW and BSC with patient.   DME delivered to patients room, Sophia RN here from [facility] to perform teaching, RAVINDER Roberts on her way for teaching. Confirmed with [facility] who are aware and will contact patient to set up time of home visit.

## 2022-04-21 NOTE — DISCHARGE PLANNING
Meds-to-Beds: Discharge prescription orders listed below delivered to patient's bedside. RENU Cartagena notified. Patient counseled. Patient elected to have co-payment billed to patient account.     Current Outpatient Medications   Medication Sig Dispense Refill   • methocarbamol (ROBAXIN) 500 MG Tab Take 1 Tablet by mouth 3 times a day as needed. 120 Tablet 0   • oxyCODONE immediate-release (ROXICODONE) 5 MG Tab Take 1 Tablet by mouth every 6 hours as needed for Severe Pain for up to 5 days. 20 Tablet 0      Cheli Allred, PharmD

## 2022-04-21 NOTE — DISCHARGE PLANNING
Anticipated Discharge Disposition: Home w/ [facility]    Action: HH referral sent to [facility] [place]    Spoke with Adilene who states they contract with pt's insurance, however facesheet is not showing PCP. RN w/ [facility] in with pt right now and verified pt does not have a PCP. [facility] states they're unable to accept, even if appt is made and pending they would still be unable to accept.     RENU Breen w/ [facility] updated. She states she confirmed with their office they can schedule pt for outpatient PICC care and labs. She will complete teaching with pt now.     Barriers to Discharge: None    Plan: D/C home with home infusions and outpatient PICC care/labs

## 2022-04-21 NOTE — PROGRESS NOTES
"Radiology Progress Note   Author: CRISTOPHER Sahni Date & Time created: 4/21/2022  11:28 AM   Date of admission  4/14/2022  Note to reader: this note follows the APSO format rather than the historical SOAP format. Assessment and plan located at the top of the note for ease of use.    Chief Complaint  46 y.o. male admitted 4/14/2022 with   Chief Complaint   Patient presents with   • Sent by MD Dr. Eb Alcazar from [facility] sent patient over to rule out infection r/t large hematoma of psoas muscle   • Hip Pain     Hip, lower back, and buttocks pain since march. Seen by many specialists.        HPI  \" 46 y.o. male admitted 4/14/2022 with several weeks h/o pain right low back and hip.  Unclear etiology but did have suprapubic catheter placed due to ureteral obstruction approximately 2 months ago.  Denies any other for material or hardware in his body\"      Assessment/Plan  Interval History   Principal Problem:    Psoas abscess (HCC)  Active Problems:    Sepsis (HCC)    Leukocytosis    Chronic pain syndrome    Thrombocytosis    Normocytic anemia    MSSA bacteremia    Septic arthritis of sacroiliac joint (HCC)    Rash and nonspecific skin eruption      Plan IR  - Drain removed   - Antibiotics per ID   - Psoas fluid cultures + Staphylococcus aureus   - Blood cultures + MSSA  - Wound education provided to patient   -Thank you for allowing Interventional Radiology team to participate in the patients care, if any additional care or requests are needed in the future please do not hesitate call or place IR order. IR signing off          IR:   4/16-  Right iliacus abscess   4/18 - 20 ml serosanguinous fluid, MRI T spine diffuse low marrow signal intensity throughout the imaged osseous structures. Nonspecific finding which can be seen in the setting of chronic disease, diffuse infiltrating processes such as multiple myeloma, blood dyscrasias and metastases.    4/19- Serosanguinous drainage, starting to become serous in " color, WBC WNL 8.8    4/20- Decreasing drain output 10 ml today, fluid becoming serous in color  4/21- Scant drain output overnight, reviwed images with IR michelle Rosales to remove drain at bedside, minimal residual fluid and air noted along the anterior iliacus measuring approximately 1.3 x 1.7 cm. There is edema   within the right iliopsoas muscle.     Review of Systems  Physical Exam   Review of Systems   Constitutional: Positive for malaise/fatigue. Negative for chills and fever.   HENT: Negative for hearing loss.    Eyes: Negative for blurred vision.   Respiratory: Negative for cough, hemoptysis and shortness of breath.    Cardiovascular: Negative for chest pain and palpitations.   Gastrointestinal: Negative for abdominal pain and vomiting.   Genitourinary: Positive for dysuria and flank pain.   Musculoskeletal: Negative for myalgias.   Skin: Negative for rash.   Neurological: Negative for dizziness, weakness and headaches.   Endo/Heme/Allergies: Does not bruise/bleed easily.   Psychiatric/Behavioral: Negative for suicidal ideas.      Vitals:    04/21/22 0725   BP: 110/74   Pulse: 80   Resp: 16   Temp: 36.1 °C (97 °F)   SpO2: 95%        Physical Exam  Constitutional:       Appearance: Normal appearance.   HENT:      Head: Normocephalic.      Nose: Nose normal.      Mouth/Throat:      Mouth: Mucous membranes are moist.   Eyes:      Pupils: Pupils are equal, round, and reactive to light.   Cardiovascular:      Rate and Rhythm: Normal rate.   Pulmonary:      Effort: Pulmonary effort is normal. No respiratory distress.   Abdominal:      General: Abdomen is flat.      Tenderness: There is no abdominal tenderness.      Comments: Suprapubic catheter    Musculoskeletal:         General: Tenderness present. No deformity.      Cervical back: Normal range of motion.   Skin:     General: Skin is warm and dry.      Capillary Refill: Capillary refill takes less than 2 seconds.      Coloration: Skin is not jaundiced or pale.    Neurological:      Mental Status: He is alert and oriented to person, place, and time.      Motor: Weakness present.   Psychiatric:         Mood and Affect: Mood normal.         Behavior: Behavior normal.             Labs    Recent Labs     04/19/22  1406   WBC 8.8   RBC 3.70*   HEMOGLOBIN 11.0*   HEMATOCRIT 33.9*   MCV 91.6   MCH 29.7   MCHC 32.4*   RDW 42.0   PLATELETCT 427   MPV 8.2*     Recent Labs     04/19/22  1406   SODIUM 140   POTASSIUM 4.7   CHLORIDE 101   CO2 28   GLUCOSE 100*   BUN 13   CREATININE 0.97   CALCIUM 8.8     Recent Labs     04/19/22  1406   ALBUMIN 3.6   CREATININE 0.97     CT-ABDOMEN-PELVIS WITH   Final Result      1.  Previously noted collection along the right iliopsoas is significantly decreased in size compared to the prior examination with minimal residual fluid and air noted along the anterior iliacus measuring approximately 1.3 x 1.7 cm. There is edema    within the right iliopsoas muscle.   2.  Bladder wall thickening. Correlation with urinalysis is recommended as this can be seen in the setting of cystitis.   3.  Borderline splenomegaly.   4.  Erosive changes at the right sacroiliac joint are again noted. Correlation for inflammatory or infectious sacroiliitis is recommended.      IR-PICC LINE PLACEMENT W/ GUIDANCE > AGE 5   Final Result                  Ultrasound-guided PICC placement performed by qualified nursing staff as    above.          MR-THORACIC SPINE-WITH & W/O   Final Result      1.  No evidence of acute abnormality or abnormal enhancement in the thoracic spine.   2.  Diffuse low marrow signal intensity throughout the imaged osseous structures. Nonspecific finding which can be seen in the setting of chronic disease, diffuse infiltrating processes such as multiple myeloma, blood dyscrasias and metastases.      MR-CERVICAL SPINE-WITH & W/O   Final Result      1.  No acute abnormality or abnormal enhancement in the cervical spine.   2.  Multilevel degenerative changes of  the cervical spine as described above.   3.  Diffuse low marrow signal intensity throughout the imaged osseous structures. Nonspecific finding which can be seen in the setting of chronic disease, diffuse infiltrating processes such as multiple myeloma, blood dyscrasias and metastases.      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 There is increased T2 fluid signal intensity in the right sacroiliac joint space also suspicious for septic arthritis.      Attempts to convey these findings to Dr. DAMON LEVI were initiated on 4/17/2022 5:44 PM.      Findings were discussed with Dr. Weston on 4/17/2022 at approximately 1800 hours.      CT-DRAIN-RETROPERITONEAL   Final Result      1.  CT guided retroperitoneal right lower quadrant catheter drainage.   2.  The current plan is to obtain a follow-up CT scan in 5-7 days.      CT-ABDOMEN-PELVIS WITH   Final Result      1.  Right posterior/inferior psoas muscle fluid collection which extends into the anterior/inferior iliacus muscle and measures 6.5 x 5 x 2.5 cm in size. This likely represents abscess versus hematoma. There is also asymmetric enlargement of the right    psoas and iliac vessels consistent with myositis.      2.  Sclerosis and erosive change of the right SI joint. Consideration should be given for inflammatory or infectious sacroiliitis.      3.  Bibasilar atelectasis.      EC-ECHOCARDIOGRAM COMPLETE W/O CONT   Final Result          INR   Date Value Ref Range Status   04/14/2022 1.25 (H) 0.87 - 1.13 Final     Comment:     INR - Non-therapeutic Reference Range: 0.87-1.13  INR - Therapeutic Reference Range: 2.0-4.0       No results found for: POCINR     Intake/Output Summary (Last 24 hours) at 4/18/2022 1429  Last data filed at 4/18/2022 0900  Gross per 24 hour   Intake --   Output 3980 ml   Net -3980 ml      Labs not explicitly included in this progress note were reviewed by the author.  Radiology/imaging not explicitly included in this progress note was reviewed by the author.     I have performed a physical exam and reviewed and updated ROS and Plan today (4/21/2022).     15 minutes in directly providing and coordinating care and extensive data review.  No time overlap and excludes procedures.

## 2022-04-21 NOTE — CARE PLAN
Problem: Nutritional:  Goal: Achieve adequate nutritional intake  Description: Oral diet to start and pt will consume >50% from meals provided   Outcome: Met     Diet advanced to regular diet and is eating >% of all meals. Most meals are % since 4/16.     RD will continue to monitor informally per dept policy.

## 2022-04-21 NOTE — DISCHARGE PLANNING
Call from Sophia at [facility], she advised that patient is not showing on his GF Alejandra's coverage and cannot perform IV teaching until this is confirmed. Daptomycin will cost approx $1000 per week which patient cannot afford.

## 2022-04-22 ENCOUNTER — HOSPITAL ENCOUNTER (OUTPATIENT)
Facility: MEDICAL CENTER | Age: 47
End: 2022-04-22
Attending: INTERNAL MEDICINE
Payer: COMMERCIAL

## 2022-04-22 LAB
ALBUMIN SERPL BCP-MCNC: 3.5 G/DL (ref 3.2–4.9)
ALBUMIN/GLOB SERPL: 0.8 G/DL
ALP SERPL-CCNC: 75 U/L (ref 30–99)
ALT SERPL-CCNC: 41 U/L (ref 2–50)
ANION GAP SERPL CALC-SCNC: 13 MMOL/L (ref 7–16)
AST SERPL-CCNC: 38 U/L (ref 12–45)
BASOPHILS # BLD AUTO: 0.4 % (ref 0–1.8)
BASOPHILS # BLD: 0.04 K/UL (ref 0–0.12)
BILIRUB SERPL-MCNC: 0.2 MG/DL (ref 0.1–1.5)
BUN SERPL-MCNC: 13 MG/DL (ref 8–22)
CALCIUM SERPL-MCNC: 9.4 MG/DL (ref 8.5–10.5)
CHLORIDE SERPL-SCNC: 101 MMOL/L (ref 96–112)
CK SERPL-CCNC: 82 U/L (ref 0–154)
CO2 SERPL-SCNC: 24 MMOL/L (ref 20–33)
CREAT SERPL-MCNC: 0.89 MG/DL (ref 0.5–1.4)
CRP SERPL HS-MCNC: 2.64 MG/DL (ref 0–0.75)
EOSINOPHIL # BLD AUTO: 0.48 K/UL (ref 0–0.51)
EOSINOPHIL NFR BLD: 5.4 % (ref 0–6.9)
ERYTHROCYTE [DISTWIDTH] IN BLOOD BY AUTOMATED COUNT: 43.8 FL (ref 35.9–50)
GFR SERPLBLD CREATININE-BSD FMLA CKD-EPI: 105 ML/MIN/1.73 M 2
GLOBULIN SER CALC-MCNC: 4.3 G/DL (ref 1.9–3.5)
GLUCOSE SERPL-MCNC: 126 MG/DL (ref 65–99)
HCT VFR BLD AUTO: 34.8 % (ref 42–52)
HGB BLD-MCNC: 11.2 G/DL (ref 14–18)
IMM GRANULOCYTES # BLD AUTO: 0.03 K/UL (ref 0–0.11)
IMM GRANULOCYTES NFR BLD AUTO: 0.3 % (ref 0–0.9)
LYMPHOCYTES # BLD AUTO: 2 K/UL (ref 1–4.8)
LYMPHOCYTES NFR BLD: 22.3 % (ref 22–41)
MCH RBC QN AUTO: 29.9 PG (ref 27–33)
MCHC RBC AUTO-ENTMCNC: 32.2 G/DL (ref 33.7–35.3)
MCV RBC AUTO: 93 FL (ref 81.4–97.8)
MONOCYTES # BLD AUTO: 0.36 K/UL (ref 0–0.85)
MONOCYTES NFR BLD AUTO: 4 % (ref 0–13.4)
NEUTROPHILS # BLD AUTO: 6.05 K/UL (ref 1.82–7.42)
NEUTROPHILS NFR BLD: 67.6 % (ref 44–72)
NRBC # BLD AUTO: 0 K/UL
NRBC BLD-RTO: 0 /100 WBC
PLATELET # BLD AUTO: 460 K/UL (ref 164–446)
PMV BLD AUTO: 8.8 FL (ref 9–12.9)
POTASSIUM SERPL-SCNC: 4.2 MMOL/L (ref 3.6–5.5)
PROT SERPL-MCNC: 7.8 G/DL (ref 6–8.2)
RBC # BLD AUTO: 3.74 M/UL (ref 4.7–6.1)
SODIUM SERPL-SCNC: 138 MMOL/L (ref 135–145)
WBC # BLD AUTO: 9 K/UL (ref 4.8–10.8)

## 2022-04-22 PROCEDURE — 82550 ASSAY OF CK (CPK): CPT

## 2022-04-22 PROCEDURE — 86140 C-REACTIVE PROTEIN: CPT

## 2022-04-22 PROCEDURE — 80053 COMPREHEN METABOLIC PANEL: CPT

## 2022-04-22 PROCEDURE — 85025 COMPLETE CBC W/AUTO DIFF WBC: CPT

## 2022-04-22 PROCEDURE — 85652 RBC SED RATE AUTOMATED: CPT

## 2022-04-22 NOTE — PROGRESS NOTES
Pt. discharged to home. Pt left unit via WC with escort .   Pt is AO x 4  On RA  Walker delivered  Pt received teaching from [facility]  PICC in place at the time of discharge  Reviewed discharge instructions, follow up appointments, and prescription with the patient. Patient states understanding of all the instructions. Discharge instructions and personal belongings with patient upon leaving.

## 2022-04-23 LAB — ERYTHROCYTE [SEDIMENTATION RATE] IN BLOOD BY WESTERGREN METHOD: 86 MM/HOUR (ref 0–20)

## 2022-04-26 ENCOUNTER — TELEPHONE (OUTPATIENT)
Dept: VASCULAR LAB | Facility: MEDICAL CENTER | Age: 47
End: 2022-04-26
Payer: COMMERCIAL

## 2022-04-26 NOTE — TELEPHONE ENCOUNTER
Patient complained of rash after receiving daptomycin infusion. Called and spoke with patient to see if this has improved. He said that it has been a lot better - he is taking OTC Benadryl an hour before each daptomycin infusion and using topical cortisone cream. He reports that his rash has much improved. Informed him that ID physician suspects rash is secondary to cefazolin that he received inpatient rather that the daptomycin. Recommended that he try omitting the Benadryl before his next infusion to see how he does without it given likelihood rash is from the cefazolin, patient stated he will do this.    Patient also asked when he needs to schedule another appointment with [facility] - cc'd Dr. Rodriguez to see when and if he should schedule an appointment.    Alena Lyons, PharmD  PGY2 Infectious Diseases Pharmacy Resident    CC: Boston Rodriguez MD

## 2022-04-28 ENCOUNTER — TELEPHONE (OUTPATIENT)
Dept: INFECTIOUS DISEASES | Facility: MEDICAL CENTER | Age: 47
End: 2022-04-28
Payer: COMMERCIAL

## 2022-04-29 ENCOUNTER — HOSPITAL ENCOUNTER (OUTPATIENT)
Facility: MEDICAL CENTER | Age: 47
End: 2022-04-29
Attending: INTERNAL MEDICINE
Payer: COMMERCIAL

## 2022-04-30 LAB
FORWARD REASON: SPWHY: NORMAL
FORWARDED TO LAB: SPWHR: NORMAL
SPECIMEN SENT (2ND): SPWT2: NORMAL
SPECIMEN SENT (3RD): SPWT3: NORMAL
SPECIMEN SENT: SPWT1: NORMAL

## 2022-05-06 ENCOUNTER — HOSPITAL ENCOUNTER (OUTPATIENT)
Facility: MEDICAL CENTER | Age: 47
End: 2022-05-06
Attending: INTERNAL MEDICINE
Payer: COMMERCIAL

## 2022-05-06 LAB
ALBUMIN SERPL BCP-MCNC: 4.1 G/DL (ref 3.2–4.9)
ALBUMIN/GLOB SERPL: 1.2 G/DL
ALP SERPL-CCNC: 79 U/L (ref 30–99)
ALT SERPL-CCNC: 28 U/L (ref 2–50)
ANION GAP SERPL CALC-SCNC: 13 MMOL/L (ref 7–16)
AST SERPL-CCNC: 17 U/L (ref 12–45)
BASOPHILS # BLD AUTO: 0.8 % (ref 0–1.8)
BASOPHILS # BLD: 0.06 K/UL (ref 0–0.12)
BILIRUB SERPL-MCNC: 0.2 MG/DL (ref 0.1–1.5)
BUN SERPL-MCNC: 8 MG/DL (ref 8–22)
CALCIUM SERPL-MCNC: 9.1 MG/DL (ref 8.5–10.5)
CHLORIDE SERPL-SCNC: 103 MMOL/L (ref 96–112)
CK SERPL-CCNC: 125 U/L (ref 0–154)
CO2 SERPL-SCNC: 23 MMOL/L (ref 20–33)
CREAT SERPL-MCNC: 1.01 MG/DL (ref 0.5–1.4)
EOSINOPHIL # BLD AUTO: 0.33 K/UL (ref 0–0.51)
EOSINOPHIL NFR BLD: 4.2 % (ref 0–6.9)
ERYTHROCYTE [DISTWIDTH] IN BLOOD BY AUTOMATED COUNT: 46.6 FL (ref 35.9–50)
ERYTHROCYTE [SEDIMENTATION RATE] IN BLOOD BY WESTERGREN METHOD: 16 MM/HOUR (ref 0–20)
GFR SERPLBLD CREATININE-BSD FMLA CKD-EPI: 91 ML/MIN/1.73 M 2
GLOBULIN SER CALC-MCNC: 3.4 G/DL (ref 1.9–3.5)
GLUCOSE SERPL-MCNC: 126 MG/DL (ref 65–99)
HCT VFR BLD AUTO: 38.6 % (ref 42–52)
HGB BLD-MCNC: 12.8 G/DL (ref 14–18)
IMM GRANULOCYTES # BLD AUTO: 0.03 K/UL (ref 0–0.11)
IMM GRANULOCYTES NFR BLD AUTO: 0.4 % (ref 0–0.9)
LYMPHOCYTES # BLD AUTO: 2.73 K/UL (ref 1–4.8)
LYMPHOCYTES NFR BLD: 34.9 % (ref 22–41)
MCH RBC QN AUTO: 30.1 PG (ref 27–33)
MCHC RBC AUTO-ENTMCNC: 33.2 G/DL (ref 33.7–35.3)
MCV RBC AUTO: 90.8 FL (ref 81.4–97.8)
MONOCYTES # BLD AUTO: 0.35 K/UL (ref 0–0.85)
MONOCYTES NFR BLD AUTO: 4.5 % (ref 0–13.4)
NEUTROPHILS # BLD AUTO: 4.33 K/UL (ref 1.82–7.42)
NEUTROPHILS NFR BLD: 55.2 % (ref 44–72)
NRBC # BLD AUTO: 0 K/UL
NRBC BLD-RTO: 0 /100 WBC
PLATELET # BLD AUTO: 303 K/UL (ref 164–446)
PMV BLD AUTO: 9.5 FL (ref 9–12.9)
POTASSIUM SERPL-SCNC: 3.8 MMOL/L (ref 3.6–5.5)
PROT SERPL-MCNC: 7.5 G/DL (ref 6–8.2)
RBC # BLD AUTO: 4.25 M/UL (ref 4.7–6.1)
SODIUM SERPL-SCNC: 139 MMOL/L (ref 135–145)
WBC # BLD AUTO: 7.8 K/UL (ref 4.8–10.8)

## 2022-05-06 PROCEDURE — 80053 COMPREHEN METABOLIC PANEL: CPT

## 2022-05-06 PROCEDURE — 85652 RBC SED RATE AUTOMATED: CPT

## 2022-05-06 PROCEDURE — 82550 ASSAY OF CK (CPK): CPT

## 2022-05-06 PROCEDURE — 85025 COMPLETE CBC W/AUTO DIFF WBC: CPT

## 2022-05-09 ENCOUNTER — HOSPITAL ENCOUNTER (OUTPATIENT)
Dept: RADIOLOGY | Facility: MEDICAL CENTER | Age: 47
End: 2022-05-09
Payer: COMMERCIAL

## 2022-05-13 ENCOUNTER — HOSPITAL ENCOUNTER (OUTPATIENT)
Facility: MEDICAL CENTER | Age: 47
End: 2022-05-13
Attending: INTERNAL MEDICINE
Payer: COMMERCIAL

## 2022-05-14 LAB
FORWARD REASON: SPWHY: NORMAL
FORWARDED TO LAB: SPWHR: NORMAL
SPECIMEN SENT (2ND): SPWT2: NORMAL
SPECIMEN SENT (3RD): SPWT3: NORMAL
SPECIMEN SENT (4TH): SPWT4: NORMAL
SPECIMEN SENT: SPWT1: NORMAL

## 2022-05-17 NOTE — PROGRESS NOTES
Infectious Disease Follow up Note      Subjective:     Chief Complaint   Patient presents with   • Follow-Up     Hospital [facility] psoas abscess, adjacent osteomyelitis         Interval History:   46 y.o. male admitted 4/14/2022 with several weeks h/o pain right low back and hip.  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  Blood cultures on 4/14/2022 were positive for MSSA.  Repeat blood cultures on 4/16 were negative.  TTE was negative.  He developed a rash secondary to cefazolin so he was transitioned to daptomycin through 5/28/2022.    Hospital records reviewed.  Recent labs on 5/20 reviewed.  WBC 8.1, platelets 241, creatinine 0.78, ESR 18.  CPK unavailable    Patient here for follow-up.  Patient is doing well.  He has some minimal 2-3 out of 10 pain on the right psoas area but this is significantly improved since his hospitalization.  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  He denies any issues with daptomycin.  No bilateral lower extremity cramping.  No issues with his PICC line.  His stop date of IV antibiotics is on 5/28 however he is requesting that they be discontinued on 5/27 as he will need to go to the [facility] office on that day.  He denies any recent fevers, chills, nausea, vomiting, abdominal pain or diarrhea.    Review of Systems   Constitutional: Negative for chills and fever.   Respiratory: Negative for cough and shortness of breath.    Gastrointestinal: Negative for abdominal pain, nausea and vomiting.   Musculoskeletal: Positive for myalgias.        Right hip, significantly improved       Past Medical History:   Diagnosis Date   • Concussion        No past surgical history on file.    Allergies: No Known Allergies      Medications:  Current Outpatient Medications on File Prior to Visit   Medication Sig Dispense Refill   • methocarbamol (ROBAXIN) 500 MG Tab Take 1 Tablet by mouth 3 times a day as needed. 120  "Tablet 0   • acetaminophen (TYLENOL) 500 MG Tab Take 1,000 mg by mouth 2 times daily with meals as needed. Indications: Pain     • gabapentin (NEURONTIN) 300 MG Cap Take 1 Capsule by mouth 3 times a day. Start with 300mg PO nightly, Day 2 start 300mg PO BID, Day 7 start 300mg PO TID, then increase each dose (one at a time) every 5 days by 300mg to 900mg TID maximum (Patient taking differently: Take 300 mg by mouth 3 times a day.) 90 Capsule 1     No current facility-administered medications on file prior to visit.         ROS  As documented above in my HPI       Objective:     PE:  /70 (BP Location: Left arm, Patient Position: Sitting, BP Cuff Size: Adult)   Pulse 72   Resp 16   Ht 1.803 m (5' 11\")   Wt 79.4 kg (175 lb)   SpO2 98%   BMI 24.41 kg/m²      Vital signs reviewed  Constitutional: patient is oriented to person, place, and time. Appears well-developed and well-nourished. No distress  Eyes: Conjunctivae normal and EOM are normal. Pupils are equal, round, and reactive to light.   Mouth/Throat: Lips without lesions, good dentition, oropharynx is clear and moist.  Neck: Trachea midline. Normal range of motion. Neck supple. No masses  Cardiovascular: Normal rate, regular rhythm, normal heart sounds and intact distal pulses. No murmur, gallop, or friction rub. No edema.  Pulmonary/Chest: No respiratory distress. Unlabored respiratory effort, lungs clear to auscultation. No wheezes or rales.   Abdominal: Soft, non tender. BS + x 4. No masses or hepatosplenomegaly.   Musculoskeletal: Normal range of motion. No tenderness, swelling, erythema, deformity noted.  Right upper extremity PICC line in place-nontender, no surrounding erythema  Neurological: alert and oriented to person, place, and time. No cranial nerve deficit. Coordination normal. Moves all extremities  Skin: Skin is warm and dry. Good turgor. No rashes visable.  Psychiatric: Normal mood and affect. Behavior is normal.  Pleasant    LABS:  WBC "   Date/Time Value Ref Range Status   05/06/2022 02:05 PM 7.8 4.8 - 10.8 K/uL Final     RBC   Date/Time Value Ref Range Status   05/06/2022 02:05 PM 4.25 (L) 4.70 - 6.10 M/uL Final     Hemoglobin   Date/Time Value Ref Range Status   05/06/2022 02:05 PM 12.8 (L) 14.0 - 18.0 g/dL Final     Hematocrit   Date/Time Value Ref Range Status   05/06/2022 02:05 PM 38.6 (L) 42.0 - 52.0 % Final     MCV   Date/Time Value Ref Range Status   05/06/2022 02:05 PM 90.8 81.4 - 97.8 fL Final     MCH   Date/Time Value Ref Range Status   05/06/2022 02:05 PM 30.1 27.0 - 33.0 pg Final     MCHC   Date/Time Value Ref Range Status   05/06/2022 02:05 PM 33.2 (L) 33.7 - 35.3 g/dL Final     MPV   Date/Time Value Ref Range Status   05/06/2022 02:05 PM 9.5 9.0 - 12.9 fL Final        Sodium   Date/Time Value Ref Range Status   05/06/2022 02:05  135 - 145 mmol/L Final     Potassium   Date/Time Value Ref Range Status   05/06/2022 02:05 PM 3.8 3.6 - 5.5 mmol/L Final     Chloride   Date/Time Value Ref Range Status   05/06/2022 02:05  96 - 112 mmol/L Final     Co2   Date/Time Value Ref Range Status   05/06/2022 02:05 PM 23 20 - 33 mmol/L Final     Glucose   Date/Time Value Ref Range Status   05/06/2022 02:05  (H) 65 - 99 mg/dL Final     Bun   Date/Time Value Ref Range Status   05/06/2022 02:05 PM 8 8 - 22 mg/dL Final     Creatinine   Date/Time Value Ref Range Status   05/06/2022 02:05 PM 1.01 0.50 - 1.40 mg/dL Final     Alkaline Phosphatase   Date/Time Value Ref Range Status   05/06/2022 02:05 PM 79 30 - 99 U/L Final     AST(SGOT)   Date/Time Value Ref Range Status   05/06/2022 02:05 PM 17 12 - 45 U/L Final     ALT(SGPT)   Date/Time Value Ref Range Status   05/06/2022 02:05 PM 28 2 - 50 U/L Final     Total Bilirubin   Date/Time Value Ref Range Status   05/06/2022 02:05 PM 0.2 0.1 - 1.5 mg/dL Final        CPK Total   Date/Time Value Ref Range Status   05/06/2022 02:05  0 - 154 U/L Final        MICRO:  No results found for:  BLOODCULTU, BLDCULT, BCHOLD       IMAGING STUDIES:  None new    Assessment/Plan:     Problem List Items Addressed This Visit     Psoas abscess (HCC)    MSSA bacteremia    Septic arthritis of sacroiliac joint (HCC)      Other Visit Diagnoses     Sacral osteomyelitis (HCC)            Patient is doing well clinically.  He is tolerating IV daptomycin without any issues.  Pain has significantly improved.  Review of recent labs from 5/20 are unremarkable.    -Continue IV daptomycin 8 mg/kg daily.  Original stop date is 5/28/2022.  However okay to complete antibiotics through 5/27/2022 as this is the date that he will be at the [facility] office.  -Okay to discontinue PICC line after last dose on 5/27/2022  -Continue weekly CBC with differential, BMP and CPK      Follow up: RTC sooner if needed. FU with PCP for ongoing chronic medical conditions.     Juanita Johnston M.D.      Please note that this dictation was created using voice recognition software. I have worked with technical experts from [facility] to optimize the interface.  I have made every reasonable attempt to correct obvious errors, but there may be errors of grammar and possibly content that I did not discover before finalizing the note.

## 2022-05-24 ENCOUNTER — OFFICE VISIT (OUTPATIENT)
Dept: INFECTIOUS DISEASES | Facility: MEDICAL CENTER | Age: 47
End: 2022-05-24
Payer: COMMERCIAL

## 2022-05-24 VITALS
WEIGHT: 175 LBS | BODY MASS INDEX: 24.5 KG/M2 | SYSTOLIC BLOOD PRESSURE: 108 MMHG | DIASTOLIC BLOOD PRESSURE: 70 MMHG | OXYGEN SATURATION: 98 % | HEIGHT: 71 IN | RESPIRATION RATE: 16 BRPM | HEART RATE: 72 BPM

## 2022-05-24 DIAGNOSIS — M46.58 SEPTIC ARTHRITIS OF SACROILIAC JOINT (HCC): ICD-10-CM

## 2022-05-24 DIAGNOSIS — R78.81 MSSA BACTEREMIA: ICD-10-CM

## 2022-05-24 DIAGNOSIS — B95.61 MSSA BACTEREMIA: ICD-10-CM

## 2022-05-24 DIAGNOSIS — K68.12 PSOAS ABSCESS (HCC): ICD-10-CM

## 2022-05-24 DIAGNOSIS — M46.28 SACRAL OSTEOMYELITIS (HCC): ICD-10-CM

## 2022-05-24 PROCEDURE — 99214 OFFICE O/P EST MOD 30 MIN: CPT | Performed by: INTERNAL MEDICINE

## 2022-05-24 ASSESSMENT — ENCOUNTER SYMPTOMS
NAUSEA: 0
CHILLS: 0
ABDOMINAL PAIN: 0
SHORTNESS OF BREATH: 0
FEVER: 0
MYALGIAS: 1
COUGH: 0
VOMITING: 0

## 2022-05-24 ASSESSMENT — FIBROSIS 4 INDEX: FIB4 SCORE: 0.51

## 2022-06-07 ENCOUNTER — DOCUMENTATION (OUTPATIENT)
Dept: INFECTIOUS DISEASES | Facility: MEDICAL CENTER | Age: 47
End: 2022-06-07
Payer: COMMERCIAL

## 2022-06-07 ENCOUNTER — APPOINTMENT (OUTPATIENT)
Dept: RADIOLOGY | Facility: MEDICAL CENTER | Age: 47
End: 2022-06-07
Attending: EMERGENCY MEDICINE
Payer: COMMERCIAL

## 2022-06-07 ENCOUNTER — HOSPITAL ENCOUNTER (EMERGENCY)
Facility: MEDICAL CENTER | Age: 47
End: 2022-06-07
Attending: EMERGENCY MEDICINE
Payer: COMMERCIAL

## 2022-06-07 VITALS
TEMPERATURE: 99.2 F | HEART RATE: 80 BPM | OXYGEN SATURATION: 97 % | DIASTOLIC BLOOD PRESSURE: 76 MMHG | WEIGHT: 177.69 LBS | BODY MASS INDEX: 24.88 KG/M2 | HEIGHT: 71 IN | SYSTOLIC BLOOD PRESSURE: 131 MMHG | RESPIRATION RATE: 17 BRPM

## 2022-06-07 DIAGNOSIS — M60.9 MYOSITIS OF MULTIPLE SITES, UNSPECIFIED MYOSITIS TYPE: ICD-10-CM

## 2022-06-07 DIAGNOSIS — M86.29 SUBACUTE OSTEOMYELITIS OF MULTIPLE SITES (HCC): ICD-10-CM

## 2022-06-07 LAB
ALBUMIN SERPL BCP-MCNC: 4.1 G/DL (ref 3.2–4.9)
ALBUMIN/GLOB SERPL: 1.2 G/DL
ALP SERPL-CCNC: 77 U/L (ref 30–99)
ALT SERPL-CCNC: 14 U/L (ref 2–50)
ANION GAP SERPL CALC-SCNC: 14 MMOL/L (ref 7–16)
APPEARANCE UR: CLEAR
AST SERPL-CCNC: 21 U/L (ref 12–45)
BACTERIA #/AREA URNS HPF: ABNORMAL /HPF
BASOPHILS # BLD AUTO: 0.3 % (ref 0–1.8)
BASOPHILS # BLD: 0.03 K/UL (ref 0–0.12)
BILIRUB SERPL-MCNC: 0.8 MG/DL (ref 0.1–1.5)
BILIRUB UR QL STRIP.AUTO: NEGATIVE
BUN SERPL-MCNC: 10 MG/DL (ref 8–22)
CALCIUM SERPL-MCNC: 9.3 MG/DL (ref 8.4–10.2)
CHLORIDE SERPL-SCNC: 100 MMOL/L (ref 96–112)
CO2 SERPL-SCNC: 24 MMOL/L (ref 20–33)
COLOR UR: YELLOW
CREAT SERPL-MCNC: 0.8 MG/DL (ref 0.5–1.4)
CRP SERPL HS-MCNC: 14.92 MG/DL (ref 0–0.75)
EOSINOPHIL # BLD AUTO: 0.14 K/UL (ref 0–0.51)
EOSINOPHIL NFR BLD: 1.3 % (ref 0–6.9)
EPI CELLS #/AREA URNS HPF: ABNORMAL /HPF
ERYTHROCYTE [DISTWIDTH] IN BLOOD BY AUTOMATED COUNT: 45.9 FL (ref 35.9–50)
ERYTHROCYTE [SEDIMENTATION RATE] IN BLOOD BY WESTERGREN METHOD: 49 MM/HOUR (ref 0–20)
FLUAV RNA SPEC QL NAA+PROBE: NEGATIVE
FLUBV RNA SPEC QL NAA+PROBE: NEGATIVE
GFR SERPLBLD CREATININE-BSD FMLA CKD-EPI: 109 ML/MIN/1.73 M 2
GLOBULIN SER CALC-MCNC: 3.5 G/DL (ref 1.9–3.5)
GLUCOSE SERPL-MCNC: 96 MG/DL (ref 65–99)
GLUCOSE UR STRIP.AUTO-MCNC: NEGATIVE MG/DL
HCT VFR BLD AUTO: 38.6 % (ref 42–52)
HGB BLD-MCNC: 12.8 G/DL (ref 14–18)
IMM GRANULOCYTES # BLD AUTO: 0.05 K/UL (ref 0–0.11)
IMM GRANULOCYTES NFR BLD AUTO: 0.4 % (ref 0–0.9)
KETONES UR STRIP.AUTO-MCNC: NEGATIVE MG/DL
LACTATE BLD-SCNC: 1.4 MMOL/L (ref 0.5–2)
LEUKOCYTE ESTERASE UR QL STRIP.AUTO: ABNORMAL
LYMPHOCYTES # BLD AUTO: 2.05 K/UL (ref 1–4.8)
LYMPHOCYTES NFR BLD: 18.3 % (ref 22–41)
MCH RBC QN AUTO: 30 PG (ref 27–33)
MCHC RBC AUTO-ENTMCNC: 33.2 G/DL (ref 33.7–35.3)
MCV RBC AUTO: 90.6 FL (ref 81.4–97.8)
MICRO URNS: ABNORMAL
MONOCYTES # BLD AUTO: 0.78 K/UL (ref 0–0.85)
MONOCYTES NFR BLD AUTO: 7 % (ref 0–13.4)
NEUTROPHILS # BLD AUTO: 8.13 K/UL (ref 1.82–7.42)
NEUTROPHILS NFR BLD: 72.7 % (ref 44–72)
NITRITE UR QL STRIP.AUTO: NEGATIVE
NRBC # BLD AUTO: 0 K/UL
NRBC BLD-RTO: 0 /100 WBC
PH UR STRIP.AUTO: 7 [PH] (ref 5–8)
PLATELET # BLD AUTO: 268 K/UL (ref 164–446)
PMV BLD AUTO: 9.1 FL (ref 9–12.9)
POTASSIUM SERPL-SCNC: 3.9 MMOL/L (ref 3.6–5.5)
PROCALCITONIN SERPL-MCNC: 0.12 NG/ML
PROT SERPL-MCNC: 7.6 G/DL (ref 6–8.2)
PROT UR QL STRIP: 30 MG/DL
RBC # BLD AUTO: 4.26 M/UL (ref 4.7–6.1)
RBC # URNS HPF: ABNORMAL /HPF
RBC UR QL AUTO: ABNORMAL
RSV RNA SPEC QL NAA+PROBE: NEGATIVE
SARS-COV-2 RNA RESP QL NAA+PROBE: NOTDETECTED
SODIUM SERPL-SCNC: 138 MMOL/L (ref 135–145)
SP GR UR STRIP.AUTO: 1.01
SPECIMEN SOURCE: NORMAL
WBC # BLD AUTO: 11.2 K/UL (ref 4.8–10.8)
WBC #/AREA URNS HPF: ABNORMAL /HPF

## 2022-06-07 PROCEDURE — A9576 INJ PROHANCE MULTIPACK: HCPCS | Performed by: EMERGENCY MEDICINE

## 2022-06-07 PROCEDURE — A9270 NON-COVERED ITEM OR SERVICE: HCPCS | Performed by: EMERGENCY MEDICINE

## 2022-06-07 PROCEDURE — 71045 X-RAY EXAM CHEST 1 VIEW: CPT

## 2022-06-07 PROCEDURE — 700102 HCHG RX REV CODE 250 W/ 637 OVERRIDE(OP): Performed by: EMERGENCY MEDICINE

## 2022-06-07 PROCEDURE — 72197 MRI PELVIS W/O & W/DYE: CPT

## 2022-06-07 PROCEDURE — 72158 MRI LUMBAR SPINE W/O & W/DYE: CPT

## 2022-06-07 PROCEDURE — 51705 CHANGE OF BLADDER TUBE: CPT

## 2022-06-07 PROCEDURE — C9803 HOPD COVID-19 SPEC COLLECT: HCPCS | Performed by: EMERGENCY MEDICINE

## 2022-06-07 PROCEDURE — 86140 C-REACTIVE PROTEIN: CPT

## 2022-06-07 PROCEDURE — 99285 EMERGENCY DEPT VISIT HI MDM: CPT

## 2022-06-07 PROCEDURE — 85652 RBC SED RATE AUTOMATED: CPT

## 2022-06-07 PROCEDURE — 81001 URINALYSIS AUTO W/SCOPE: CPT

## 2022-06-07 PROCEDURE — 36415 COLL VENOUS BLD VENIPUNCTURE: CPT

## 2022-06-07 PROCEDURE — 0241U HCHG SARS-COV-2 COVID-19 NFCT DS RESP RNA 4 TRGT MIC: CPT

## 2022-06-07 PROCEDURE — 84145 PROCALCITONIN (PCT): CPT

## 2022-06-07 PROCEDURE — 83605 ASSAY OF LACTIC ACID: CPT

## 2022-06-07 PROCEDURE — 85025 COMPLETE CBC W/AUTO DIFF WBC: CPT

## 2022-06-07 PROCEDURE — 700117 HCHG RX CONTRAST REV CODE 255: Performed by: EMERGENCY MEDICINE

## 2022-06-07 PROCEDURE — 80053 COMPREHEN METABOLIC PANEL: CPT

## 2022-06-07 PROCEDURE — 87086 URINE CULTURE/COLONY COUNT: CPT

## 2022-06-07 PROCEDURE — 87040 BLOOD CULTURE FOR BACTERIA: CPT

## 2022-06-07 PROCEDURE — 87077 CULTURE AEROBIC IDENTIFY: CPT

## 2022-06-07 RX ORDER — OXYCODONE HYDROCHLORIDE 5 MG/1
5 TABLET ORAL EVERY 4 HOURS PRN
COMMUNITY

## 2022-06-07 RX ORDER — DOXYCYCLINE 100 MG/1
100 CAPSULE ORAL 2 TIMES DAILY
Qty: 28 CAPSULE | Refills: 0 | Status: SHIPPED | OUTPATIENT
Start: 2022-06-07 | End: 2022-06-21

## 2022-06-07 RX ORDER — DOXYCYCLINE 100 MG/1
100 TABLET ORAL ONCE
Status: COMPLETED | OUTPATIENT
Start: 2022-06-07 | End: 2022-06-07

## 2022-06-07 RX ADMIN — DOXYCYCLINE 100 MG: 100 TABLET, FILM COATED ORAL at 18:20

## 2022-06-07 RX ADMIN — GADOTERIDOL 15 ML: 279.3 INJECTION, SOLUTION INTRAVENOUS at 16:00

## 2022-06-07 ASSESSMENT — FIBROSIS 4 INDEX: FIB4 SCORE: 0.51

## 2022-06-07 NOTE — PROGRESS NOTES
Pt completed IV abx for MSSA bacteremia, psoas abscess and septic arthritis of sacroiliac joint. He stopped abx on 5/27/22 and PICC line removed.     He called and reports ongoing back pain which was severe over the weekend requiring use of crutches and new onset fevers up to 101 last night.  No other symptoms.     --- Recommended coming into the ED for repeat imaging - MRI L spine with contrast to ensure no new or worsening of prior abscess/infection. If something is found such as abscess this would require admit.  --- Labs  --- Evaluation for other sources of fever (would test for COVID and influenza) etc.     Lisa English MD

## 2022-06-07 NOTE — DISCHARGE PLANNING
note:  Attempted to talk to pt about options of staying or be transferred to in network hospital but he was at MRI.

## 2022-06-07 NOTE — ED TRIAGE NOTES
"Patient presents to the ED with the following complaints:  Chief Complaint   Patient presents with   • Hip Pain     Increasing pain since recent treatment. Patient had recent osteomyelitis of the R hip. Had a PICC line in for IV antibiotics that was removed on the 28th of may. Called infectious disease doctor who told him to com back in to reassess hip.    • Fever     Fever of 101 last night. Last time tylenol was taken was 4 days ago.        BP (!) 132/93   Pulse (!) 101   Temp 37.4 °C (99.3 °F) (Temporal)   Resp 19   Ht 1.803 m (5' 11\")   Wt 80.6 kg (177 lb 11.1 oz)   SpO2 97%   BMI 24.78 kg/m²     Vaccinated for covid x2.   "

## 2022-06-07 NOTE — ED PROVIDER NOTES
ED Provider Note    CHIEF COMPLAINT  Chief Complaint   Patient presents with   • Hip Pain     Increasing pain since recent treatment. Patient had recent osteomyelitis of the R hip. Had a PICC line in for IV antibiotics that was removed on the 28th of may. Called infectious disease doctor who told him to com back in to reassess hip.    • Fever     Fever of 101 last night. Last time tylenol was taken was 4 days ago.        HPI  Gary Ramon is a 46 y.o. male who presents to the emergency department with complaint of right-sided hip pain and fever.  The patient has a very tortuous course with infection.  The patient has psoas abscess that was drained with interventional radiology on 4/16/2022 and the culture grew MSSA.  Patient was treated with IV antibiotics until 5/20/2022 and cultures were negative.  The patient states that he has had chronic right hip pain and low back pain since that time but has increasing severity last 3 to 4 days with ambulation.  The patient has had a fever intermittently for the last 4 days as well and the high as 101 degrees.  The pain increases ambulation and decreases with rest.  He denies cough, nausea, vomiting, nasal discharge, abdominal pain.  He also has a suprapubic catheter that has not been changed for several months and is due to be changed next week.  The patient denies abdominal pain or evidence of infection.  He did call Dr. English, infectious disease specialist, who encouraged him to come to the emergency department for evaluation today for MRI and sepsis.  The patient is vaccinated for COVID and not for influenza.        REVIEW OF SYSTEMS  Positives as above. Pertinent negatives loss of sensation or strength arms or legs, saddle anesthesia  All other 10 review of systems are negative    PAST MEDICAL HISTORY  Past Medical History:   Diagnosis Date   • Concussion        FAMILY HISTORY  Noncontributory    SOCIAL HISTORY  Social History     Socioeconomic History   • Marital  "status:    Tobacco Use   • Smoking status: Never Smoker   • Smokeless tobacco: Never Used   Vaping Use   • Vaping Use: Never used   Substance and Sexual Activity   • Alcohol use: Not Currently   • Drug use: Yes     Types: Inhaled     Comment: marijuana occ       SURGICAL HISTORY  History reviewed. No pertinent surgical history.    CURRENT MEDICATIONS  Home Medications     Reviewed by Caitlin Sharp (Pharmacy Tech) on 06/07/22 at 1635  Med List Status: Complete   Medication Last Dose Status   acetaminophen (TYLENOL) 500 MG Tab 6/5/2022 Active   DAPTOMYCIN IV 5/27/2022 Active   gabapentin (NEURONTIN) 300 MG Cap >3 weeks Active   methocarbamol (ROBAXIN) 500 MG Tab > 1 month Active   oxyCODONE immediate-release (ROXICODONE) 5 MG Tab 6/5/2022 Active                ALLERGIES  No Known Allergies    PHYSICAL EXAM  VITAL SIGNS: /76   Pulse 80   Temp 37.3 °C (99.2 °F) (Temporal)   Resp 17   Ht 1.803 m (5' 11\")   Wt 80.6 kg (177 lb 11.1 oz)   SpO2 97%   BMI 24.78 kg/m²      Constitutional: Well developed, Well nourished, No acute distress, Non-toxic appearance.   Eyes: PERRLA, EOMI, Conjunctiva normal, No discharge.   Cardiovascular: Normal heart rate, Normal rhythm, No murmurs, No rubs, No gallops, and intact distal pulses.   Thorax & Lungs:  No respiratory distress, no rales, no rhonchi, No wheezing, No chest wall tenderness.   Abdomen: Bowel sounds normal, Soft, suprapubic catheter is in place, no tenderness, No guarding, No rebound, No pulsatile masses.   Skin: Warm, Dry, No erythema, No rash.   Extremities: Full range of motion, no deformity, no edema.  Musculoskeletal: Right-sided lumbar muscle spasm and tenderness, no edema, no bogginess  Neurologic: Alert & oriented x 3, No focal deficits noted, acting appropriately on exam.  Psychiatric: Affect normal for clinical presentation.      LABORATORY/ECG  Results for orders placed or performed during the hospital encounter of 06/07/22   CBC WITH " DIFFERENTIAL   Result Value Ref Range    WBC 11.2 (H) 4.8 - 10.8 K/uL    RBC 4.26 (L) 4.70 - 6.10 M/uL    Hemoglobin 12.8 (L) 14.0 - 18.0 g/dL    Hematocrit 38.6 (L) 42.0 - 52.0 %    MCV 90.6 81.4 - 97.8 fL    MCH 30.0 27.0 - 33.0 pg    MCHC 33.2 (L) 33.7 - 35.3 g/dL    RDW 45.9 35.9 - 50.0 fL    Platelet Count 268 164 - 446 K/uL    MPV 9.1 9.0 - 12.9 fL    Neutrophils-Polys 72.70 (H) 44.00 - 72.00 %    Lymphocytes 18.30 (L) 22.00 - 41.00 %    Monocytes 7.00 0.00 - 13.40 %    Eosinophils 1.30 0.00 - 6.90 %    Basophils 0.30 0.00 - 1.80 %    Immature Granulocytes 0.40 0.00 - 0.90 %    Nucleated RBC 0.00 /100 WBC    Neutrophils (Absolute) 8.13 (H) 1.82 - 7.42 K/uL    Lymphs (Absolute) 2.05 1.00 - 4.80 K/uL    Monos (Absolute) 0.78 0.00 - 0.85 K/uL    Eos (Absolute) 0.14 0.00 - 0.51 K/uL    Baso (Absolute) 0.03 0.00 - 0.12 K/uL    Immature Granulocytes (abs) 0.05 0.00 - 0.11 K/uL    NRBC (Absolute) 0.00 K/uL   COMP METABOLIC PANEL   Result Value Ref Range    Sodium 138 135 - 145 mmol/L    Potassium 3.9 3.6 - 5.5 mmol/L    Chloride 100 96 - 112 mmol/L    Co2 24 20 - 33 mmol/L    Anion Gap 14.0 7.0 - 16.0    Glucose 96 65 - 99 mg/dL    Bun 10 8 - 22 mg/dL    Creatinine 0.80 0.50 - 1.40 mg/dL    Calcium 9.3 8.4 - 10.2 mg/dL    AST(SGOT) 21 12 - 45 U/L    ALT(SGPT) 14 2 - 50 U/L    Alkaline Phosphatase 77 30 - 99 U/L    Total Bilirubin 0.8 0.1 - 1.5 mg/dL    Albumin 4.1 3.2 - 4.9 g/dL    Total Protein 7.6 6.0 - 8.2 g/dL    Globulin 3.5 1.9 - 3.5 g/dL    A-G Ratio 1.2 g/dL   URINALYSIS    Specimen: Urine, Clean Catch; Respirate   Result Value Ref Range    Color Yellow     Character Clear     Specific Gravity 1.010 <1.035    Ph 7.0 5.0 - 8.0    Glucose Negative Negative mg/dL    Ketones Negative Negative mg/dL    Protein 30 (A) Negative mg/dL    Bilirubin Negative Negative    Nitrite Negative Negative    Leukocyte Esterase Small (A) Negative    Occult Blood Large (A) Negative    Micro Urine Req Microscopic    LACTIC ACID    Result Value Ref Range    Lactic Acid 1.4 0.5 - 2.0 mmol/L   Sed Rate   Result Value Ref Range    Sed Rate Westergren 49 (H) 0 - 20 mm/hour   ESTIMATED GFR   Result Value Ref Range    GFR (CKD-EPI) 109 >60 mL/min/1.73 m 2   CRP QUANTITIVE (NON-CARDIAC)   Result Value Ref Range    Stat C-Reactive Protein 14.92 (H) 0.00 - 0.75 mg/dL   CoV-2, FLU A/B, and RSV by PCR (2-4 Hours CEPHEID) : Collect NP swab in VTM    Specimen: Respirate   Result Value Ref Range    Influenza virus A RNA Negative Negative    Influenza virus B, PCR Negative Negative    RSV, PCR Negative Negative    SARS-CoV-2 by PCR NotDetected     SARS-CoV-2 Source NP Swab    PROCALCITONIN   Result Value Ref Range    Procalcitonin 0.12 <0.25 ng/mL   URINE MICROSCOPIC (W/UA)   Result Value Ref Range    WBC 5-10 (A) /hpf    RBC 10-20 (A) /hpf    Bacteria Few (A) None /hpf    Epithelial Cells Few Few /hpf         RADIOLOGY/PROCEDURES  I did review the MRIs are completed on 4/17/2022 of the thoracic, lumbar and cervical spine.  MR-PELVIS-WITH & W/O AND SEQUENCES   Final Result      Interval removal of right iliac fossa pigtail catheter with similar edema and enhancement in the region but no recurrent abscess      Interval decrease in size of right iliacus muscle abscess. There is moderate adjacent myositis      Right SI joint effusion and adjacent enhancement is compatible with osteomyelitis and septic arthropathy which was present previously      No new abscess      MR-LUMBAR SPINE-WITH & W/O   Final Result      1.  Right sacroiliac joint septic arthritis. When compared with the previous MRI there has been interval reduction in the extent of T2 hyperintensity suggesting improvement.   2.  Enlargement of right iliac muscle with abnormal contrast enhancement likely representing infection. There is no well-defined fluid collection in the visualized portion.      DX-CHEST-PORTABLE (1 VIEW)   Final Result      No radiographic evidence of acute cardiopulmonary  process.          COURSE & MEDICAL DECISION MAKING  Pertinent Labs & Imaging studies reviewed. (See chart for details)  This is a charming 46-year-old male who presents with right hip pain and back pain.  he does have a history in the past of osteomyelitis sacroiliac joint as well as psoas abscess.  Here in the emergency department, I discussed the patient with Dr. English who recommends MRI of the lumbar spine and pelvis.  He does have significant creased inflammatory markers here and a slight leukocytosis he is afebrile.  Patient MRI completed revealed evidence of healing sacroiliitis, septic arthritis, no evidence of i intramuscular abscess although he has evidence of myositis and possible infection of the iliac us muscle.  I did discuss this with Dr. English who recommends doxycycline twice daily for 14 days.  The patient is instructed to follow-up with Dr. English in a week to 2 weeks and return to the emergency department as increasing pain, evidence of overlying infection.      FINAL IMPRESSION     1. Myositis of multiple sites, unspecified myositis type    2. Subacute osteomyelitis of multiple sites (HCC)      DISPOSITION:  Patient will be discharged home in stable condition.    FOLLOW UP:  [facility], Emergency Dept  [address]  [place] [id number] [phone]    If symptoms worsen      OUTPATIENT MEDICATIONS:  Discharge Medication List as of 6/7/2022  6:31 PM      START taking these medications    Details   doxycycline (MONODOX) 100 MG capsule Take 1 Capsule by mouth 2 times a day for 14 days., Disp-28 Capsule, R-0, Normal             Electronically signed by: Ayo Mckenzie D.O., 6/7/2022 1:06 PM

## 2022-06-07 NOTE — ED NOTES
COVID swab collected and sent. Supra pubic changed. Pt will call when urine is draining for a sample. MRI screening complete.

## 2022-06-08 NOTE — DISCHARGE INSTRUCTIONS
You have evidence of probable myositis and possible muscle infection but no evidence of abscess.  I discussed your case with Dr. English and she recommends taking doxycycline 1 mg by mouth twice daily for 14 days.  She will be calling you to make an appointment a week or 2 in her clinic.  Return to the emergency department if you continue have severe high fevers, uncontrolled pain.

## 2022-06-08 NOTE — PROGRESS NOTES
Pt was evaluated in ED, he has been afebrile and work-up unremarkable with the exception of possible myositis noted in right iliac, no fluid collection the drain. Also noted known R sacroiliac joint septic arthritis but this is improved from prior. No new or recurrent abscess.     ---Discussed with ER physician and recommended doxycycline 100 mg BID x14 days and ID will schedule follow up clinic visit.     Lisa English MD

## 2022-06-08 NOTE — DISCHARGE PLANNING
note:  Met with pt, he prefers to stay at [facility] if he is admitted. He is not worried about any copays.

## 2022-06-10 LAB
BACTERIA UR CULT: NORMAL
SIGNIFICANT IND 70042: NORMAL
SITE SITE: NORMAL
SOURCE SOURCE: NORMAL

## 2022-06-23 ENCOUNTER — OFFICE VISIT (OUTPATIENT)
Dept: INFECTIOUS DISEASES | Facility: MEDICAL CENTER | Age: 47
End: 2022-06-23
Payer: COMMERCIAL

## 2022-06-23 VITALS
RESPIRATION RATE: 16 BRPM | TEMPERATURE: 97.9 F | HEART RATE: 84 BPM | OXYGEN SATURATION: 96 % | WEIGHT: 178 LBS | SYSTOLIC BLOOD PRESSURE: 100 MMHG | BODY MASS INDEX: 24.92 KG/M2 | HEIGHT: 71 IN | DIASTOLIC BLOOD PRESSURE: 80 MMHG

## 2022-06-23 DIAGNOSIS — R78.81 MSSA BACTEREMIA: ICD-10-CM

## 2022-06-23 DIAGNOSIS — M46.58 SEPTIC ARTHRITIS OF SACROILIAC JOINT (HCC): ICD-10-CM

## 2022-06-23 DIAGNOSIS — M46.28 SACRAL OSTEOMYELITIS (HCC): ICD-10-CM

## 2022-06-23 DIAGNOSIS — B95.61 MSSA BACTEREMIA: ICD-10-CM

## 2022-06-23 DIAGNOSIS — K68.12 PSOAS ABSCESS (HCC): ICD-10-CM

## 2022-06-23 PROCEDURE — 99213 OFFICE O/P EST LOW 20 MIN: CPT | Performed by: INTERNAL MEDICINE

## 2022-06-23 RX ORDER — DOXYCYCLINE HYCLATE 100 MG
100 TABLET ORAL 2 TIMES DAILY
Qty: 28 TABLET | Refills: 0 | Status: SHIPPED | OUTPATIENT
Start: 2022-06-23 | End: 2022-07-07

## 2022-06-23 ASSESSMENT — FIBROSIS 4 INDEX: FIB4 SCORE: 1.01

## 2022-06-23 NOTE — PROGRESS NOTES
[facility]    Subjective:     Chief Complaint   Patient presents with   • Follow-Up     psoas abscess, adjacent osteomyelitis     46 y.o. male admitted 4/14/2022 with several weeks h/o pain right low back and hip.  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  Blood cultures on 4/14/2022 were positive for MSSA.  Repeat blood cultures on 4/16 were negative.  TTE was negative.  He developed a rash secondary to cefazolin so he was transitioned to daptomycin through 5/28/2022.     Hospital records reviewed.  Recent labs on 5/20 reviewed.  WBC 8.1, platelets 241, creatinine 0.78, ESR 18.  CPK unavailable     Patient here for follow-up.  Patient is doing well.  He has some minimal 2-3 out of 10 pain on the right psoas area but this is significantly improved since his hospitalization.  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  He denies any issues with daptomycin.  No bilateral lower extremity cramping.  No issues with his PICC line.  His stop date of IV antibiotics is on 5/28 however he is requesting that they be discontinued on 5/27 as he will need to go to [facility] on that day.  He denies any recent fevers, chills, nausea, vomiting, abdominal pain or diarrhea.    Interval History: Approximately week after stopping antibiotics the patient developed fevers at home up to 101 and had increasing lower back and buttocks pain.  ID recommended to go into the ER for evaluation including blood cultures and MRI.     Hospital records reviewed and discussed with ER doctor at the time.  Blood cultures were obtained and are no growth and final.  Repeat MRI did show some ongoing SI inflammation and iliopsoas abscess.  Overall the MRI findings showed improvement.  Recommendation was made to the patient on doxycycline 100 mg twice daily and then follow-up in ID clinic.    Today, 6/23/2022: Patient reports feeling well and  "has been tolerating the doxycycline with some sun sensitivity, otherwise without adverse effect.  Denies feeling generally ill, fevers/chills, general malaise, headache, n/v/d, abdominal pain, chest pain or shortness of breath.  He still has some mild right gluteal pain but overall much improved and no more fevers.    ROS  As documented above in my HPI.    Past Medical History:   Diagnosis Date   • Concussion        Social History     Tobacco Use   • Smoking status: Never Smoker   • Smokeless tobacco: Never Used   Vaping Use   • Vaping Use: Never used   Substance Use Topics   • Alcohol use: Not Currently   • Drug use: Yes     Types: Inhaled     Comment: marijuana occ       Allergies: Patient has no known allergies.    Pt's medication and problem list reviewed.     Objective:     PE:  /80 (BP Location: Left arm, Patient Position: Sitting, BP Cuff Size: Adult)   Pulse 84   Temp 36.6 °C (97.9 °F)   Resp 16   Ht 1.803 m (5' 11\")   Wt 80.7 kg (178 lb)   SpO2 96%   BMI 24.83 kg/m²     Vital signs reviewed    Constitutional: Appears well-developed and well-nourished. No acute distress.  Speech fluent.    Eyes: Conjunctivae normal and EOM are normal. Pupils are equal, round, and reactive to light.   ENMT: Lips without lesions, good dentition.  Oropharynx is clear and moist.  Neck: Trachea midline. Normal range of motion. Neck supple. No masses.  No JVD.  Cardiovascular: Normal rate, regular rhythm, normal heart sounds and intact distal pulses. No murmur, gallop, or friction rub. No edema.  Respiratory: No respiratory distress, unlabored respiratory effort.  Lungs clear to auscultation bilaterally. No wheezes or rales.   Abdomen: Soft, non tender, non-distended. BS + x 4. No masses or hepatosplenomegaly.   Musculoskeletal: Steady gait.  Normal range of motion.  No joint or bone tenderness, swelling, erythema or deformity.  No clubbing or cyanosis.  Some pain on deep palpation of right gluteus muscle.  Skin: Warm " and dry. Good turgor. No visible rashes or lesions.  Neurological: No cranial nerve deficit. Coordination normal.  Sensation intact.  Psychiatric: Alert and oriented to person, place, and time. Normal mood, calm affect.  Normal behavior and judgment.     Labs:  WBC   Date/Time Value Ref Range Status   06/07/2022 12:15 PM 11.2 (H) 4.8 - 10.8 K/uL Final     RBC   Date/Time Value Ref Range Status   06/07/2022 12:15 PM 4.26 (L) 4.70 - 6.10 M/uL Final     Hemoglobin   Date/Time Value Ref Range Status   06/07/2022 12:15 PM 12.8 (L) 14.0 - 18.0 g/dL Final     Hematocrit   Date/Time Value Ref Range Status   06/07/2022 12:15 PM 38.6 (L) 42.0 - 52.0 % Final     MCV   Date/Time Value Ref Range Status   06/07/2022 12:15 PM 90.6 81.4 - 97.8 fL Final     MCH   Date/Time Value Ref Range Status   06/07/2022 12:15 PM 30.0 27.0 - 33.0 pg Final     MCHC   Date/Time Value Ref Range Status   06/07/2022 12:15 PM 33.2 (L) 33.7 - 35.3 g/dL Final     MPV   Date/Time Value Ref Range Status   06/07/2022 12:15 PM 9.1 9.0 - 12.9 fL Final        Sodium   Date/Time Value Ref Range Status   06/07/2022 12:15  135 - 145 mmol/L Final     Potassium   Date/Time Value Ref Range Status   06/07/2022 12:15 PM 3.9 3.6 - 5.5 mmol/L Final     Chloride   Date/Time Value Ref Range Status   06/07/2022 12:15  96 - 112 mmol/L Final     Co2   Date/Time Value Ref Range Status   06/07/2022 12:15 PM 24 20 - 33 mmol/L Final     Glucose   Date/Time Value Ref Range Status   06/07/2022 12:15 PM 96 65 - 99 mg/dL Final     Bun   Date/Time Value Ref Range Status   06/07/2022 12:15 PM 10 8 - 22 mg/dL Final     Creatinine   Date/Time Value Ref Range Status   06/07/2022 12:15 PM 0.80 0.50 - 1.40 mg/dL Final     Glom Filt Rate, Est   Date/Time Value Ref Range Status   12/22/2021 11:08 AM 77 >60 mL/min/1.7 Final     Comment:     Estimated GFR derived from the MDRD Study equation can be used in patients who are in the hospital.  However, it is important to pay  attention to potential inaccuracies due to the non-steady state of serum creatinine, co-morbidities that cause malnutrition, and the use of medications that interfere with the measurement of serum creatinine.    The estimated GFR is only accurate for patients greater than 18 years of age.       Alkaline Phosphatase   Date/Time Value Ref Range Status   06/07/2022 12:15 PM 77 30 - 99 U/L Final     AST(SGOT)   Date/Time Value Ref Range Status   06/07/2022 12:15 PM 21 12 - 45 U/L Final     ALT(SGPT)   Date/Time Value Ref Range Status   06/07/2022 12:15 PM 14 2 - 50 U/L Final     Total Bilirubin   Date/Time Value Ref Range Status   06/07/2022 12:15 PM 0.8 0.1 - 1.5 mg/dL Final        CPK Total   Date/Time Value Ref Range Status   05/06/2022 02:05  0 - 154 U/L Final        Assessment and Plan:   The following treatment plan was discussed with patient at length:    1. Psoas abscess (HCC)     2. Septic arthritis of sacroiliac joint (HCC)     3. Sacral osteomyelitis (HCC)     4. MSSA bacteremia       --- Reviewed blood cultures from ER visit and no growth.  Reviewed MRI with overall improvement but some ongoing edema near the right iliac fossa, decreased but still present iliac us muscle abscess and some SI joint effusion as well as myositis.  Patient was continued on doxycycline with significant improvement in his symptoms.  --- Continue doxycycline 100 mg twice daily for another 2 weeks and then stop and monitor  --- If any recurrence in symptoms such as fever or significant worsening pain would recommend repeating MRI  --- Discussed dosing and side effects of medications being prescribed.  Pt instructed to call clinic with any adverse effects or to discontinue the medication and go to the ER should difficulty breathing, SOB, CP, facial swelling and/or gross rash/itching occurs.     Follow up: PRN, RTC sooner if needed. FU with PCP for ongoing chronic medical conditions.     Lisa English M.D.       Please  note that this dictation was created using voice recognition software. I have  worked with technical experts from [facility] to optimize the interface.  I have made every reasonable attempt to correct obvious errors, but there may be errors of grammar and possibly content that I did not discover before finalizing the note.

## 2022-07-20 ENCOUNTER — TELEPHONE (OUTPATIENT)
Dept: INFECTIOUS DISEASES | Facility: MEDICAL CENTER | Age: 47
End: 2022-07-20
Payer: COMMERCIAL

## 2022-07-20 DIAGNOSIS — M46.28 SACRAL OSTEOMYELITIS (HCC): ICD-10-CM

## 2022-07-20 DIAGNOSIS — M46.58 SEPTIC ARTHRITIS OF SACROILIAC JOINT (HCC): ICD-10-CM

## 2022-07-20 NOTE — TELEPHONE ENCOUNTER
Pt concerned for infection he has some pain in area, no fever. He does have a catheter in place and noticed discharge coming out of urethra 3 days after he stopped abx, he did a urine test that showed WBC.    He is wondering if maybe he has an abscess draining causing infection?     He is wondering what he should do.

## 2022-07-21 NOTE — TELEPHONE ENCOUNTER
Pt is at urology now, I asked if they could send over any notes to us so we can stay informed and pt will schedule MRI.

## 2022-07-27 NOTE — TELEPHONE ENCOUNTER
Boston Rodriguez M.D.  You          Reviewed.  So patient has a suprapubic catheter and most of his urine comes out from there.  Urology thinks that the urethral discharge may be small amounts of urine and cells from the urethra.  Looks like they also want to proceed with repair of his urethra unless there are objections from us.  There are no absolute contraindications from our standpoint.     Looks like they got a urine culture and will give preoperative antibiotics as well.

## 2022-07-31 NOTE — TELEPHONE ENCOUNTER
[facility] faxed order to [phone] and confirmation was scanned into her chart  
PMD

## 2022-08-12 ENCOUNTER — TELEPHONE (OUTPATIENT)
Dept: INFECTIOUS DISEASES | Facility: MEDICAL CENTER | Age: 47
End: 2022-08-12
Payer: COMMERCIAL

## 2022-08-12 NOTE — TELEPHONE ENCOUNTER
Pts pain has returned to infected area.  His insurance is [employer], he is going to call them to see if they are contacted and if he needs a referral he will let me know and I will send all records.